# Patient Record
Sex: FEMALE | Race: BLACK OR AFRICAN AMERICAN | NOT HISPANIC OR LATINO | Employment: FULL TIME | URBAN - METROPOLITAN AREA
[De-identification: names, ages, dates, MRNs, and addresses within clinical notes are randomized per-mention and may not be internally consistent; named-entity substitution may affect disease eponyms.]

---

## 2017-06-01 ENCOUNTER — HOSPITAL ENCOUNTER (EMERGENCY)
Facility: HOSPITAL | Age: 22
Discharge: HOME | End: 2017-06-01
Admitting: EMERGENCY MEDICINE
Payer: COMMERCIAL

## 2017-06-01 ENCOUNTER — APPOINTMENT (EMERGENCY)
Dept: RADIOLOGY | Facility: HOSPITAL | Age: 22
End: 2017-06-01
Payer: COMMERCIAL

## 2017-06-01 ENCOUNTER — APPOINTMENT (EMERGENCY)
Dept: CT IMAGING | Facility: HOSPITAL | Age: 22
End: 2017-06-01
Payer: COMMERCIAL

## 2017-06-01 VITALS
HEIGHT: 65 IN | BODY MASS INDEX: 29.99 KG/M2 | DIASTOLIC BLOOD PRESSURE: 80 MMHG | SYSTOLIC BLOOD PRESSURE: 145 MMHG | HEART RATE: 83 BPM | OXYGEN SATURATION: 100 % | TEMPERATURE: 97.6 F | RESPIRATION RATE: 18 BRPM | WEIGHT: 180 LBS

## 2017-06-01 DIAGNOSIS — S16.1XXA STRAIN OF NECK MUSCLE, INITIAL ENCOUNTER: Primary | ICD-10-CM
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
## 2017-06-01 PROCEDURE — 70450 CT HEAD/BRAIN W/O DYE: CPT

## 2017-06-01 PROCEDURE — 99284 EMERGENCY DEPT VISIT MOD MDM: CPT

## 2017-06-01 PROCEDURE — 73130 X-RAY EXAM OF HAND: CPT

## 2017-06-01 RX ORDER — IBUPROFEN 600 MG/1
600 TABLET ORAL ONCE
Status: COMPLETED | OUTPATIENT
Start: 2017-06-01 | End: 2017-06-01

## 2017-06-01 RX ADMIN — IBUPROFEN 600 MG: 600 TABLET ORAL at 20:35

## 2017-09-09 ENCOUNTER — HOSPITAL ENCOUNTER (EMERGENCY)
Facility: HOSPITAL | Age: 22
Discharge: HOME | End: 2017-09-09
Attending: EMERGENCY MEDICINE | Admitting: EMERGENCY MEDICINE
Payer: COMMERCIAL

## 2017-09-09 VITALS
TEMPERATURE: 98.1 F | HEART RATE: 74 BPM | SYSTOLIC BLOOD PRESSURE: 127 MMHG | WEIGHT: 190.7 LBS | OXYGEN SATURATION: 100 % | RESPIRATION RATE: 16 BRPM | DIASTOLIC BLOOD PRESSURE: 71 MMHG | BODY MASS INDEX: 31.73 KG/M2

## 2017-09-09 DIAGNOSIS — B35.4 TINEA CORPORIS: Primary | ICD-10-CM

## 2017-09-09 PROCEDURE — 99282 EMERGENCY DEPT VISIT SF MDM: CPT

## 2017-09-09 RX ORDER — CLOTRIMAZOLE 1 %
CREAM (GRAM) TOPICAL
Qty: 28 G | Refills: 0 | Status: SHIPPED | OUTPATIENT
Start: 2017-09-09 | End: 2022-02-09

## 2017-09-09 NOTE — ED PROVIDER NOTES
History  Chief Complaint   Patient presents with    Rash     pt started with a rash about two weeks ago on the left sholder and has since spead to her armpit and "vaginal area" is itchy     Patient is a pleasant 79-year-old female that reports to the emergency department with a pruritic rash over the left armpit  This developed 2 days ago  She has a history of hidradenitis, however this does not appear to be infected nor did she have any areas of abscesses that required drainage  The rash has satellite lesions consistent with tinea corporis, recommended treatment with either over-the-counter terbinafine spray or the prescription clotrimazole that I wrote for her  Patient given follow-up instructions and return precautions  The patient (and any family present) verbalized understanding of the discharge instructions and warnings that would necessitate return to the Emergency Department  All questions were answered prior to discharge  Physical exam benign other than some areas of healing hidradenitis in the groin and bilateral armpits and the rash in the left armpit  No fevers, chills, sweats, nausea, vomiting, diarrhea, headedness, dizziness  None       History reviewed  No pertinent past medical history  History reviewed  No pertinent surgical history  History reviewed  No pertinent family history  I have reviewed and agree with the history as documented      Social History   Substance Use Topics    Smoking status: Never Smoker    Smokeless tobacco: Never Used    Alcohol use No        Review of Systems    Physical Exam  ED Triage Vitals [09/09/17 0135]   Temperature Pulse Respirations Blood Pressure SpO2   98 1 °F (36 7 °C) 74 16 127/71 100 %      Temp Source Heart Rate Source Patient Position BP Location FiO2 (%)   Oral Monitor Lying Right arm --      Pain Score       --           Physical Exam    ED Medications  Medications - No data to display    Diagnostic Studies  Labs Reviewed - No data to display    No orders to display       Procedures  Procedures      Phone Contacts  ED Phone Contact    ED Course  ED Course                                MDM  CritCare Time    Disposition  Final diagnoses:   Tinea corporis     ED Disposition     ED Disposition Condition Comment    Discharge  [address] discharge to home/self care  Condition at discharge: Good        Follow-up Information     Follow up With Specialties Details Why Contact Info    Primary Care Doctor            Patient's Medications   Discharge Prescriptions    CLOTRIMAZOLE (LOTRIMIN) 1 % CREAM    Apply to affected area 2 times daily for 4 weeks       Start Date: 9/9/2017  End Date: --       Order Dose: --       Quantity: 28 g    Refills: 0     No discharge procedures on file      ED Provider  Electronically Signed by       Suraj Herrera MD  09/09/17 0657

## 2017-09-09 NOTE — DISCHARGE INSTRUCTIONS
Tinea Corporis   WHAT YOU NEED TO KNOW:   Tinea corporis, or ringworm, is a skin infection caused by a fungus  Tinea corporis is most common in school children and athletes  DISCHARGE INSTRUCTIONS:   Medicines:   · Antifungal medicine: This may be given as a cream or pill  Take the medicine until it is gone, even if it looks like your infection is gone sooner  · Take your medicine as directed  Call your healthcare provider if you think your medicine is not helping or if you have side effects  Tell him if you are allergic to any medicine  Keep a list of the medicines, vitamins, and herbs you take  Include the amounts, and when and why you take them  Bring the list or the pill bottles to follow-up visits  Carry your medicine list with you in case of an emergency  Follow up with your healthcare provider as directed:  Write down your questions so you remember to ask them during your visits  Self-care:   · Wash all items that come into contact with infected skin:  Wash all towels, clothes, and bedding in hot water  Use laundry soap  Clean shower stalls, mats, and floors with a germ-killing or fungus-killing   · Do not share personal items:  Do not share towels, brushes, fermin, or hair accessories  · Keep your skin, hair, and nails clean and dry:  Bathe every day, and dry your skin before you put medicine on the infected area  Wash your hands often  Do not scratch your sores  This may cause the infection to spread  · Avoid infected pets:  A patch of missing fur is a sign of infection in a pet  Take your infected pet to a  for treatment  Contact your healthcare provider if:   · You have a fever  · Your infection continues to spread after 7 days of treatment  · Your rash is not gone in 2 weeks  · The area around your rash becomes red, warm, tender, swollen, or smells bad  · You have questions or concerns about your condition or care    © 2016 [employer]  Information is for End User's use only and may not be sold, redistributed or otherwise used for commercial purposes  All illustrations and images included in CareNotes® are the copyrighted property of [employer]  or Osmar [employer]  The above information is an  only  It is not intended as medical advice for individual conditions or treatments  Talk to your doctor, nurse or pharmacist before following any medical regimen to see if it is safe and effective for you

## 2017-10-27 ENCOUNTER — HOSPITAL ENCOUNTER (EMERGENCY)
Facility: HOSPITAL | Age: 22
Discharge: HOME | End: 2017-10-27
Attending: EMERGENCY MEDICINE | Admitting: EMERGENCY MEDICINE
Payer: COMMERCIAL

## 2017-10-27 VITALS
OXYGEN SATURATION: 100 % | WEIGHT: 185 LBS | DIASTOLIC BLOOD PRESSURE: 61 MMHG | HEART RATE: 82 BPM | TEMPERATURE: 97.9 F | HEIGHT: 65 IN | SYSTOLIC BLOOD PRESSURE: 119 MMHG | RESPIRATION RATE: 18 BRPM | BODY MASS INDEX: 30.82 KG/M2

## 2017-10-27 DIAGNOSIS — L02.31 LEFT BUTTOCK ABSCESS: Primary | ICD-10-CM

## 2017-10-27 LAB
ANION GAP SERPL CALCULATED.3IONS-SCNC: 11 MMOL/L (ref 4–13)
BACTERIA UR QL AUTO: ABNORMAL /HPF
BASOPHILS # BLD AUTO: 0.02 THOUSANDS/ΜL (ref 0–0.1)
BASOPHILS NFR BLD AUTO: 0 % (ref 0–1)
BILIRUB UR QL STRIP: NEGATIVE
BUN SERPL-MCNC: 8 MG/DL (ref 5–25)
CALCIUM SERPL-MCNC: 9 MG/DL (ref 8.3–10.1)
CHLORIDE SERPL-SCNC: 105 MMOL/L (ref 100–108)
CLARITY UR: CLEAR
CO2 SERPL-SCNC: 23 MMOL/L (ref 21–32)
COLOR UR: YELLOW
CREAT SERPL-MCNC: 0.88 MG/DL (ref 0.6–1.3)
EOSINOPHIL # BLD AUTO: 0.17 THOUSAND/ΜL (ref 0–0.61)
EOSINOPHIL NFR BLD AUTO: 2 % (ref 0–6)
ERYTHROCYTE [DISTWIDTH] IN BLOOD BY AUTOMATED COUNT: 13.2 % (ref 11.6–15.1)
EXT PREG TEST URINE: NEGATIVE
GFR SERPL CREATININE-BSD FRML MDRD: 109 ML/MIN/1.73SQ M
GLUCOSE SERPL-MCNC: 88 MG/DL (ref 65–140)
GLUCOSE UR STRIP-MCNC: NEGATIVE MG/DL
HCT VFR BLD AUTO: 42 % (ref 34.8–46.1)
HGB BLD-MCNC: 13.3 G/DL (ref 11.5–15.4)
HGB UR QL STRIP.AUTO: ABNORMAL
KETONES UR STRIP-MCNC: NEGATIVE MG/DL
LEUKOCYTE ESTERASE UR QL STRIP: NEGATIVE
LYMPHOCYTES # BLD AUTO: 1.18 THOUSANDS/ΜL (ref 0.6–4.47)
LYMPHOCYTES NFR BLD AUTO: 11 % (ref 14–44)
MCH RBC QN AUTO: 26.9 PG (ref 26.8–34.3)
MCHC RBC AUTO-ENTMCNC: 31.7 G/DL (ref 31.4–37.4)
MCV RBC AUTO: 85 FL (ref 82–98)
MONOCYTES # BLD AUTO: 0.71 THOUSAND/ΜL (ref 0.17–1.22)
MONOCYTES NFR BLD AUTO: 7 % (ref 4–12)
NEUTROPHILS # BLD AUTO: 8.65 THOUSANDS/ΜL (ref 1.85–7.62)
NEUTS SEG NFR BLD AUTO: 80 % (ref 43–75)
NITRITE UR QL STRIP: NEGATIVE
NON-SQ EPI CELLS URNS QL MICRO: ABNORMAL /HPF
NRBC BLD AUTO-RTO: 0 /100 WBCS
PH UR STRIP.AUTO: 6.5 [PH] (ref 4.5–8)
PLATELET # BLD AUTO: 273 THOUSANDS/UL (ref 149–390)
PMV BLD AUTO: 10.7 FL (ref 8.9–12.7)
POTASSIUM SERPL-SCNC: 3.7 MMOL/L (ref 3.5–5.3)
PROT UR STRIP-MCNC: NEGATIVE MG/DL
RBC # BLD AUTO: 4.95 MILLION/UL (ref 3.81–5.12)
RBC #/AREA URNS AUTO: ABNORMAL /HPF
SODIUM SERPL-SCNC: 139 MMOL/L (ref 136–145)
SP GR UR STRIP.AUTO: 1.02 (ref 1–1.03)
UROBILINOGEN UR QL STRIP.AUTO: 1 E.U./DL
WBC # BLD AUTO: 10.77 THOUSAND/UL (ref 4.31–10.16)
WBC #/AREA URNS AUTO: ABNORMAL /HPF

## 2017-10-27 PROCEDURE — 99285 EMERGENCY DEPT VISIT HI MDM: CPT

## 2017-10-27 PROCEDURE — 80048 BASIC METABOLIC PNL TOTAL CA: CPT | Performed by: EMERGENCY MEDICINE

## 2017-10-27 PROCEDURE — 81001 URINALYSIS AUTO W/SCOPE: CPT | Performed by: EMERGENCY MEDICINE

## 2017-10-27 PROCEDURE — 36415 COLL VENOUS BLD VENIPUNCTURE: CPT | Performed by: EMERGENCY MEDICINE

## 2017-10-27 PROCEDURE — 81025 URINE PREGNANCY TEST: CPT | Performed by: EMERGENCY MEDICINE

## 2017-10-27 PROCEDURE — 93005 ELECTROCARDIOGRAM TRACING: CPT | Performed by: EMERGENCY MEDICINE

## 2017-10-27 PROCEDURE — 85025 COMPLETE CBC W/AUTO DIFF WBC: CPT | Performed by: EMERGENCY MEDICINE

## 2017-10-27 RX ORDER — NAPROXEN 500 MG/1
500 TABLET ORAL 2 TIMES DAILY WITH MEALS
Qty: 20 TABLET | Refills: 0 | Status: SHIPPED | OUTPATIENT
Start: 2017-10-27 | End: 2017-11-06

## 2017-10-27 RX ORDER — HYDROCODONE BITARTRATE AND ACETAMINOPHEN 5; 325 MG/1; MG/1
1 TABLET ORAL EVERY 6 HOURS PRN
Qty: 10 TABLET | Refills: 0 | Status: SHIPPED | OUTPATIENT
Start: 2017-10-27 | End: 2017-10-30

## 2017-10-27 RX ORDER — HYDROCODONE BITARTRATE AND ACETAMINOPHEN 5; 325 MG/1; MG/1
1 TABLET ORAL ONCE
Status: COMPLETED | OUTPATIENT
Start: 2017-10-27 | End: 2017-10-27

## 2017-10-27 RX ORDER — LIDOCAINE HYDROCHLORIDE AND EPINEPHRINE 10; 10 MG/ML; UG/ML
20 INJECTION, SOLUTION INFILTRATION; PERINEURAL ONCE
Status: COMPLETED | OUTPATIENT
Start: 2017-10-27 | End: 2017-10-27

## 2017-10-27 RX ADMIN — LIDOCAINE HYDROCHLORIDE,EPINEPHRINE BITARTRATE 20 ML: 10; .01 INJECTION, SOLUTION INFILTRATION; PERINEURAL at 19:56

## 2017-10-27 RX ADMIN — HYDROCODONE BITARTRATE AND ACETAMINOPHEN 1 TABLET: 5; 325 TABLET ORAL at 20:23

## 2017-10-27 NOTE — ED PROVIDER NOTES
History  Chief Complaint   Patient presents with    Rectal Bleeding     pt has a boil on her buttocks that burst and is bleeding and also believes she is bleeding from the rectum     51-year-old female denies past medical history presenting with chief complaint of boil on her left buttock as well as rectal bleeding  Patient states that she gets recurrent abscesses in her groin and buttock and armpits, she was evaluated at [facility] earlier this morning, she has been using warm compresses and was prescribed antibiotics (bactrim) this morning which she began taking  She with was in her usual health without fevers or other complaints she went to sit down on the toilet prior to arrival to have a bowel movement and she had a moderate amount of painless rectal bleeding in the toilet without intermixed stool or clots  She came the emergency department for evaluation she notes some pain in the area of her buttock where her boil, she noted that she felt mildly dizzy otherwise that has resolved, but otherwise denies subsequent bleeding denies history of similar denies history of poor wound healing easy bleeding or bruising, diabetes fevers chills headache chest pain cough shortness of breath nausea vomiting anorexia abdominal pain flank or back pain urinary symptoms vaginal bleeding or discharge leg swelling calf pain or tenderness  Complete review systems otherwise negative            Prior to Admission Medications   Prescriptions Last Dose Informant Patient Reported? Taking? clotrimazole (LOTRIMIN) 1 % cream   No No   Sig: Apply to affected area 2 times daily for 4 weeks      Facility-Administered Medications: None       History reviewed  No pertinent past medical history  History reviewed  No pertinent surgical history  History reviewed  No pertinent family history  I have reviewed and agree with the history as documented      Social History   Substance Use Topics    Smoking status: Never Smoker    Smokeless tobacco: Never Used    Alcohol use No        Review of Systems   Constitutional: Negative for chills and fever  HENT: Negative for rhinorrhea and sore throat  Eyes: Negative for photophobia and pain  Respiratory: Negative for cough and shortness of breath  Cardiovascular: Negative for chest pain and palpitations  Gastrointestinal: Negative for abdominal pain, diarrhea, nausea and vomiting  Buttock pain and rectal bleeding   Endocrine: Negative for polydipsia and polyphagia  Genitourinary: Negative for dysuria, frequency, urgency, vaginal bleeding and vaginal discharge  Musculoskeletal: Negative for arthralgias, back pain and myalgias  Skin: Positive for wound  Negative for color change and rash  Allergic/Immunologic: Negative for immunocompromised state  Neurological: Positive for dizziness  Negative for weakness, light-headedness and headaches  Hematological: Negative for adenopathy  Does not bruise/bleed easily  Psychiatric/Behavioral: Negative for agitation and behavioral problems  All other systems reviewed and are negative  Physical Exam  ED Triage Vitals [10/27/17 1743]   Temperature Pulse Respirations Blood Pressure SpO2   97 9 °F (36 6 °C) 82 18 119/61 100 %      Temp src Heart Rate Source Patient Position - Orthostatic VS BP Location FiO2 (%)   -- -- -- -- --      Pain Score       No Pain           Orthostatic Vital Signs  Vitals:    10/27/17 1743   BP: 119/61   Pulse: 82       Physical Exam   Constitutional: She is oriented to person, place, and time  She appears well-developed and well-nourished  No distress  Well-appearing on exam conversational no acute distress   HENT:   Head: Normocephalic and atraumatic  Eyes: EOM are normal  Pupils are equal, round, and reactive to light  Neck: Normal range of motion  Neck supple  No tracheal deviation present  Cardiovascular: Normal rate, regular rhythm and normal heart sounds    Exam reveals no gallop and no friction rub  No murmur heard  Pulmonary/Chest: Effort normal and breath sounds normal  She has no wheezes  She has no rales  Abdominal: Soft  Bowel sounds are normal  She exhibits no distension  There is no tenderness  There is no rebound and no guarding  Genitourinary:   Genitourinary Comments: Richard Tomlin see the patient's nurse was present for both the exam in the incision and drainage, on evaluation the patient does not appear to have rectal bleeding she has a 3 cm x 2 cm left-sided buttock abscess that does not track down into her perianal or rectal area, this has spontaneous seropurulent drainage the entire abscess can be isolated and lifted off of the the buttock, there was no pain with rectal exam there was no palpable mass, there is no pain or fluctuance there is no apparent fistula on exam this appears to be an uncomplicated left buttock abscess with spontaneous drainage   Musculoskeletal: Normal range of motion  She exhibits no edema or tenderness  Neurological: She is alert and oriented to person, place, and time  No cranial nerve deficit  She exhibits normal muscle tone  Coordination normal    Skin: Skin is warm and dry  No rash noted  Psychiatric: She has a normal mood and affect  Her behavior is normal    Nursing note and vitals reviewed        ED Medications  Medications   lidocaine-epinephrine (XYLOCAINE/EPINEPHRINE) 1 %-1:100,000 injection 20 mL (20 mL Infiltration Given 10/27/17 1956)   HYDROcodone-acetaminophen (NORCO) 5-325 mg per tablet 1 tablet (1 tablet Oral Given 10/27/17 2023)       Diagnostic Studies  Results Reviewed     Procedure Component Value Units Date/Time    Urine Microscopic [[id number]]  (Abnormal) Collected:  10/27/17 1855    Lab Status:  Final result Specimen:  Urine from Urine, Clean Catch Updated:  10/27/17 1914     RBC, UA 1-2 (A) /hpf      WBC, UA None Seen /hpf      Epithelial Cells Occasional /hpf      Bacteria, UA Occasional /hpf     UA w Reflex to Microscopic w Reflex to Culture [[id number]]  (Abnormal) Collected:  10/27/17 1855    Lab Status:  Final result Specimen:  Urine from Urine, Clean Catch Updated:  10/27/17 1906     Color, UA Yellow     Clarity, UA Clear     Specific [place], UA 1 020     pH, UA 6 5     Leukocytes, UA Negative     Nitrite, UA Negative     Protein, UA Negative mg/dl      Glucose, UA Negative mg/dl      Ketones, UA Negative mg/dl      Urobilinogen, UA 1 0 E U /dl      Bilirubin, UA Negative     Blood, UA Trace-Intact (A)    POCT pregnancy, urine [[id number]]  (Normal) Resulted:  10/27/17 1858    Lab Status:  Final result Updated:  10/27/17 1858     EXT PREG TEST UR (Ref: Negative) Negative    Basic metabolic panel [[id number]] Collected:  10/27/17 1829    Lab Status:  Final result Specimen:  Blood from Arm, Right Updated:  10/27/17 1855     Sodium 139 mmol/L      Potassium 3 7 mmol/L      Chloride 105 mmol/L      CO2 23 mmol/L      Anion Gap 11 mmol/L      BUN 8 mg/dL      Creatinine 0 88 mg/dL      Glucose 88 mg/dL      Calcium 9 0 mg/dL      eGFR 109 ml/min/1 73sq m     Narrative:         National Kidney Disease Education Program recommendations are as follows:  GFR calculation is accurate only with a steady state creatinine  Chronic Kidney disease less than 60 ml/min/1 73 sq  meters  Kidney failure less than 15 ml/min/1 73 sq  meters      CBC and differential [[id number]]  (Abnormal) Collected:  10/27/17 1829    Lab Status:  Final result Specimen:  Blood from Arm, Right Updated:  10/27/17 1842     WBC 10 77 (H) Thousand/uL      RBC 4 95 Million/uL      Hemoglobin 13 3 g/dL      Hematocrit 42 0 %      MCV 85 fL      MCH 26 9 pg      MCHC 31 7 g/dL      RDW 13 2 %      MPV 10 7 fL      Platelets 369 Thousands/uL      nRBC 0 /100 WBCs      Neutrophils Relative 80 (H) %      Lymphocytes Relative 11 (L) %      Monocytes Relative 7 %      Eosinophils Relative 2 %      Basophils Relative 0 %      Neutrophils Absolute 8 65 (H) Thousands/µL      Lymphocytes Absolute 1 18 Thousands/µL      Monocytes Absolute 0 71 Thousand/µL      Eosinophils Absolute 0 17 Thousand/µL      Basophils Absolute 0 02 Thousands/µL                  No orders to display              Procedures  ECG 12 Lead Documentation  Date/Time: 10/27/2017 6:53 PM  Performed by: Jose Antonio Akhtar by: David Mathur     Indications / Diagnosis:  Dizzy  Patient location:  ED  Previous ECG:     Previous ECG:  Unavailable  Interpretation:     Interpretation: normal    Rate:     ECG rate:  74    ECG rate assessment: normal    Rhythm:     Rhythm: sinus rhythm    Ectopy:     Ectopy: none    QRS:     QRS axis:  Normal  Conduction:     Conduction: normal    ST segments:     ST segments:  Normal  T waves:     T waves: normal    Incision/Drainage  Date/Time: 10/27/2017 8:00 PM  Performed by: Jose Antonio Akhtar by: David Mathur     Patient location:  ED  Consent:     Consent obtained:  Verbal    Consent given by:  Patient    Risks discussed:  Bleeding, damage to other organs, infection, incomplete drainage and pain    Alternatives discussed:  No treatment, delayed treatment, alternative treatment, observation and referral  Universal protocol:     Procedure explained and questions answered to patient or proxy's satisfaction: yes      Relevant documents present and verified: yes      Patient identity confirmed:  Verbally with patient and arm band  Location:     Type:  Abscess    Size:  3    Location:  Other (comment)  Pre-procedure details:     Skin preparation:  Betadine  Anesthesia (see MAR for exact dosages):      Anesthesia method:  Local infiltration    Local anesthetic:  Lidocaine 1% WITH epi  Procedure details:     Complexity:  Simple    Needle aspiration: no      Incision types:  Single straight    Scalpel blade:  11    Approach:  Open    Incision depth:  Skin    Wound management:  Probed and deloculated, irrigated with saline and extensive cleaning    Drainage:  Bloody and purulent Drainage amount: Moderate    Wound treatment:  Packing placed    Packing materials:  1/2 in gauze  Post-procedure details:     Patient tolerance of procedure:   Tolerated well, no immediate complications  Comments:      Mirela Campbell was present for exam and incision and drainage, after discussing risks benefits alternatives patient does haveSpontaneous drainage from pustule, although offered I and D, after discussing again risks benefits alternatives, the patient was laid prone, the entire area was visualized cleaned with Betadine anesthetized with 1% lidocaine with epinephrine he single small cyst straight incision was placed over the area of maximal fluctuance, the entire wound was probed de loculated with sterile hemostat and appeared to be very superficial and did not track, moderate seropurulent discharge, packed with 1/2 inch plain packing her patient given extensive discharge return instructions currently on Bactrim, she states this is recurrentDiscussed possibility of possible fistula with her current left buttock abscess adjacent to julissa anal area, will follow up, close return instructions patient agreeable plan           Phone Contacts  ED Phone Contact    ED Course  ED Course as of Oct 28 1246   Fri Oct 27, 2017   1852 Hemoglobin: 13 3   1958 Patient has abscess with renate pus and purulence on her left buttock, her heme is negative there is no perianal abscess on exam this is not consistent with a perirectal abscess is on her left buttock approximately 2 cm by 3 cm the entire abscess can be isolated, there is no palpable mass on rectal exam no apparent fistula however patient states he has had this recurrently in the same spot discussed clinical concern for possible fistula although on exam this is most consistent with uncomplicated left buttock abscess this was cleaned after discussing risks benefits and alternatives to incision and drainage a 1 cm incision was made in the wound cavity was completely drained de loculated and probed as noted in the note patient was given extensive discharge return instructions, she understands she must follow up with GI for possible colonoscopy/Colorectal surgery for further evaluation of her current left but abscess which isd adjacent julissa anal area patient understands agrees to plan, patient's female nurse present and assisted for entire exam                                MDM  Number of Diagnoses or Management Options  Left buttock abscess:   Diagnosis management comments: 82-year-old female with recurrent abscesses, including on her left buttock, seen earlier today at another facility given Bactrim for left buttock abscess now with reported rectal bleeding however on exam this is more consistent purulent discharge from the boil on her left buttock, no pain for fluctuance on rectal exam, her stool was heme negative, brown this abscess is not appear to be perianal or perirectal however it is lateral to her anus, some spontaneous drainage although offered I and D, will check basic labs EKG urine pregnancy with dizziness and reported bleeding, discussed the possibility of possible fistula she states, this is a recurring problem, will in incise and drain continue Bactrim very close return instructions patient understands this gets worse he is not improving she may require CT scan of the pelvis and surgical drainage otherwise she will follow up with surgery and GI for further evaluation, patient agreeable plan    CritCare Time    Disposition  Final diagnoses:   Left buttock abscess     Time reflects when diagnosis was documented in both MDM as applicable and the Disposition within this note     Time User Action Codes Description Comment    10/27/2017  8:07 PM Ute Leone Add [L02 31] Left buttock abscess       ED Disposition     ED Disposition Condition Comment    Discharge  [address] discharge to home/self care      Condition at discharge: Good        Follow-up Information Follow up With Specialties Details Why [address] [facility]  In 1 week  [address]    Delroy Alonso MD General Surgery In 1 week  [address]  [address]  [address] [address]  [phone]          Discharge Medication List as of 10/27/2017  8:09 PM      START taking these medications    Details   HYDROcodone-acetaminophen (NORCO) 5-325 mg per tablet Take 1 tablet by mouth every 6 (six) hours as needed for pain for up to 3 days Max Daily Amount: 4 tablets, Starting Fri 10/27/2017, Until Mon 10/30/2017, Print      naproxen (NAPROSYN) 500 mg tablet Take 1 tablet by mouth 2 (two) times a day with meals for 10 days, Starting Fri 10/27/2017, Until Mon 11/6/2017, Print         CONTINUE these medications which have NOT CHANGED    Details   clotrimazole (LOTRIMIN) 1 % cream Apply to affected area 2 times daily for 4 weeks, Print           No discharge procedures on file      ED Provider  Electronically Signed by           Nehal Todd DO  10/28/17 1240

## 2017-10-28 NOTE — DISCHARGE INSTRUCTIONS
As discussed please return at any time if you do have worsening or develops other concerning symptoms as instructed including fevers pain with defecation, worsening symptoms or other concerning symptoms  Otherwise your to follow up either with a surgeon or with the GI doctor for further evaluation of this recurrent left buttock abscess/perianal abscess concerning for possible fistula  Please continue taking the Bactrim continue local wound care as instructed    Abscess   WHAT YOU NEED TO KNOW:   A warm compress may help your abscess drain  Your healthcare provider may make a cut in the abscess so it can drain  You may need surgery to remove an abscess that is on your hands or buttocks  DISCHARGE INSTRUCTIONS:   Return to the emergency department if:   · The area around your abscess becomes very painful, warm, or has red streaks  · You have a fever and chills  · Your heart is beating faster than usual      · You feel faint or confused  Contact your healthcare provider if:   · Your abscess gets bigger or does not get better  · Your abscess returns  · You have questions or concerns about your condition or care  Medicines: You may  need any of the following:  · Antibiotics  help treat a bacterial infection  · Acetaminophen  decreases pain and fever  It is available without a doctor's order  Ask how much to take and how often to take it  Follow directions  Acetaminophen can cause liver damage if not taken correctly  · NSAIDs , such as ibuprofen, help decrease swelling, pain, and fever  This medicine is available with or without a doctor's order  NSAIDs can cause stomach bleeding or kidney problems in certain people  If you take blood thinner medicine, always ask your healthcare provider if NSAIDs are safe for you  Always read the medicine label and follow directions  · Take your medicine as directed    Contact your healthcare provider if you think your medicine is not helping or if you have side effects  Tell him or her if you are allergic to any medicine  Keep a list of the medicines, vitamins, and herbs you take  Include the amounts, and when and why you take them  Bring the list or the pill bottles to follow-up visits  Carry your medicine list with you in case of an emergency  Self-care:   · Apply a warm compress to your abscess  This will help it open and drain  Wet a washcloth in warm, but not hot, water  Apply the compress for 10 minutes  Repeat this 4 times each day  Do not  press on an abscess or try to open it with a needle  You may push the bacteria deeper or into your blood  · Do not share your clothes, towels, or sheets with anyone  This can spread the infection to others  · Wash your hands often  This can help prevent the spread of germs  Use soap and water or an alcohol-based hand rub  Care for your wound after it is drained:   · Care for your wound as directed  If your healthcare provider says it is okay, carefully remove the bandage and gauze packing  You may need to soak the gauze to get it out of your wound  Clean your wound and the area around it as directed  Dry the area and put on new, clean bandages  Change your bandages when they get wet or dirty  · Ask your healthcare provider how to change the gauze in your wound  Keep track of how many pieces of gauze are placed inside the wound  Do not put too much packing in the wound  Do not pack the gauze too tightly in your wound  Follow up with your healthcare provider in 1 to 3 days: You may need to have your packing removed or your bandage changed  Write down your questions so you remember to ask them during your visits  © 2017 [address]  Information is for End User's use only and may not be sold, redistributed or otherwise used for commercial purposes  All illustrations and images included in CareNotes® are the copyrighted property of [employer]  or [address]    The above information is an  only  It is not intended as medical advice for individual conditions or treatments  Talk to your doctor, nurse or pharmacist before following any medical regimen to see if it is safe and effective for you

## 2017-10-29 LAB
ATRIAL RATE: 74 BPM
P AXIS: 76 DEGREES
PR INTERVAL: 138 MS
QRS AXIS: 80 DEGREES
QRSD INTERVAL: 70 MS
QT INTERVAL: 384 MS
QTC INTERVAL: 426 MS
T WAVE AXIS: 30 DEGREES
VENTRICULAR RATE: 74 BPM

## 2018-11-13 ENCOUNTER — HOSPITAL ENCOUNTER (EMERGENCY)
Facility: HOSPITAL | Age: 23
Discharge: HOME | End: 2018-11-13
Attending: EMERGENCY MEDICINE
Payer: COMMERCIAL

## 2018-11-13 VITALS
HEART RATE: 74 BPM | OXYGEN SATURATION: 99 % | DIASTOLIC BLOOD PRESSURE: 64 MMHG | WEIGHT: 194.22 LBS | RESPIRATION RATE: 18 BRPM | SYSTOLIC BLOOD PRESSURE: 140 MMHG | TEMPERATURE: 98.7 F | BODY MASS INDEX: 32.32 KG/M2

## 2018-11-13 DIAGNOSIS — T14.8XXA ABRASION: Primary | ICD-10-CM

## 2018-11-13 PROCEDURE — 99284 EMERGENCY DEPT VISIT MOD MDM: CPT

## 2018-11-13 NOTE — DISCHARGE INSTRUCTIONS
Abrasion   WHAT YOU NEED TO KNOW:   An abrasion is a scrape on your skin  It happens when your skin rubs against a rough surface  Some examples of an abrasion include rug burn, a skinned elbow, or road rash  Abrasions can be many shapes and sizes  The wound may hurt, bleed, bruise, or swell  DISCHARGE INSTRUCTIONS:   Return to the emergency department if:   · The bleeding does not stop after 10 minutes of firm pressure  · You cannot rinse one or more foreign objects out of your wound  · You have red streaks on your skin coming from your wound  Contact your healthcare provider if:   · You have a fever or chills  · Your abrasion is red, warm, swollen, or draining pus  · You have questions or concerns about your condition or care  Care for your abrasion:   · Wash your hands and dry them with a clean towel  · Press a clean cloth against your wound to stop any bleeding  · Rinse your wound with a lot of clean water  Do not use harsh soap, alcohol, or iodine solutions  · Use a clean, wet cloth to remove any objects, such as small pieces of rocks or dirt  · Rub antibiotic ointment on your wound  This may help prevent infection and help your wound heal     · Cover the wound with a non-stick bandage  Change the bandage daily, and if gets wet or dirty  Follow up with your healthcare provider as directed:  Write down your questions so you remember to ask them during your visits  © 2017 [employer] Information is for End User's use only and may not be sold, redistributed or otherwise used for commercial purposes  All illustrations and images included in CareNotes® are the copyrighted property of [employer] , [employer]  or Osmar Marinelli  The above information is an  only  It is not intended as medical advice for individual conditions or treatments   Talk to your doctor, nurse or pharmacist before following any medical regimen to see if it is safe and effective for you

## 2018-11-13 NOTE — ED PROVIDER NOTES
History  Chief Complaint   Patient presents with    Rectal Bleeding     Patient reports rectal bleeding for past 2 weeks  Patient reports blood when wiping  Patient denies trauma  Patient is a 80-year-old female presents to the emergency department with complaints of rectal bleeding that has been present on and off for last 2 weeks  Patient denies constipation  Patient denies pain when defecating  Patient states that she notices the blood only on the toilet paper when she wipes  She states that there is no blood in the toilet at all  Prior to Admission Medications   Prescriptions Last Dose Informant Patient Reported? Taking? clotrimazole (LOTRIMIN) 1 % cream   No No   Sig: Apply to affected area 2 times daily for 4 weeks   naproxen (NAPROSYN) 500 mg tablet   No No   Sig: Take 1 tablet by mouth 2 (two) times a day with meals for 10 days      Facility-Administered Medications: None       History reviewed  No pertinent past medical history  History reviewed  No pertinent surgical history  History reviewed  No pertinent family history  I have reviewed and agree with the history as documented  Social History   Substance Use Topics    Smoking status: Never Smoker    Smokeless tobacco: Never Used    Alcohol use Yes      Comment: socially        Review of Systems   Constitutional: Negative for fever  Respiratory: Negative for shortness of breath  Cardiovascular: Negative for chest pain  Skin: Positive for wound  All other systems reviewed and are negative  Physical Exam  Physical Exam   Constitutional: She appears well-developed and well-nourished  HENT:   Head: Normocephalic and atraumatic  Right Ear: External ear normal    Left Ear: External ear normal    Nose: Nose normal    Mouth/Throat: Oropharynx is clear and moist    Eyes: Pupils are equal, round, and reactive to light  Conjunctivae and EOM are normal    Neck: Normal range of motion     Cardiovascular: Normal rate, regular rhythm and normal heart sounds  Pulmonary/Chest: Effort normal and breath sounds normal    Abdominal: Soft  Bowel sounds are normal    Genitourinary:         Skin: Skin is warm  Psychiatric: She has a normal mood and affect  Her behavior is normal  Judgment and thought content normal    Vitals reviewed  Vital Signs  ED Triage Vitals [11/13/18 0143]   Temperature Pulse Respirations Blood Pressure SpO2   98 7 °F (37 1 °C) 74 18 140/64 99 %      Temp Source Heart Rate Source Patient Position - Orthostatic VS BP Location FiO2 (%)   Oral Monitor Sitting Right arm --      Pain Score       No Pain           Vitals:    11/13/18 0143   BP: 140/64   Pulse: 74   Patient Position - Orthostatic VS: Sitting       Visual Acuity      ED Medications  Medications - No data to display    Diagnostic Studies  Results Reviewed     None                 No orders to display              Procedures  Procedures       Phone Contacts  ED Phone Contact    ED Course                               MDM  Number of Diagnoses or Management Options  Diagnosis management comments: Patient is a 22-year-old female presents to the emergency department with complaints of rectal bleeding  Examination there is evidence of slight venous oozing from a scar of previous buttock abscess  There is no evidence of current infection  I recommended compression over the wound to stop the bleeding  I recommended avoiding wiping the area when defecating  Return parameters discussed at length  Patient stable for discharge  Risk of Complications, Morbidity, and/or Mortality  Presenting problems: low  Diagnostic procedures: low  Management options: low    Patient Progress  Patient progress: stable    CritCare Time    Disposition  Final diagnoses:   None     ED Disposition     None      Follow-up Information    None         Patient's Medications   Discharge Prescriptions    No medications on file     No discharge procedures on file      ED Provider  Electronically Signed by           Cori Saul PA-C  11/13/18 4504

## 2019-02-07 ENCOUNTER — OFFICE VISIT (OUTPATIENT)
Dept: DERMATOLOGY | Facility: CLINIC | Age: 24
End: 2019-02-07
Payer: COMMERCIAL

## 2019-02-07 DIAGNOSIS — L73.2 HIDRADENITIS: Primary | ICD-10-CM

## 2019-02-07 DIAGNOSIS — Z13.89 SCREENING FOR SKIN CONDITION: ICD-10-CM

## 2019-02-07 PROCEDURE — 87070 CULTURE OTHR SPECIMN AEROBIC: CPT | Performed by: DERMATOLOGY

## 2019-02-07 PROCEDURE — 87205 SMEAR GRAM STAIN: CPT | Performed by: DERMATOLOGY

## 2019-02-07 PROCEDURE — 99203 OFFICE O/P NEW LOW 30 MIN: CPT | Performed by: DERMATOLOGY

## 2019-02-07 RX ORDER — CLINDAMYCIN PHOSPHATE 10 UG/ML
LOTION TOPICAL DAILY
Qty: 60 ML | Refills: 3 | Status: SHIPPED | OUTPATIENT
Start: 2019-02-07 | End: 2019-02-26 | Stop reason: SDUPTHER

## 2019-02-07 NOTE — PROGRESS NOTES
[facility] DERMATOLOGY  [address] [place] [address]  [phone]     MRN: [id number] : 1995  Encounter: [id number]  Patient Information: Giana Kramer  Chief complaint:  Cyst in the groin and axilla    History of present illness:  51-year-old female presents secondary cyst in her groin and axilla which has been going on for about a year patient was seen by another dermatologist treated with antibiotics which he could not tolerate and continues to have problems with these areas and drainage the specifically right now in the area of the left axilla  No past medical history on file  No past surgical history on file  Social History   History   Alcohol Use    Yes     Comment: socially     History   Drug Use No     History   Smoking Status    Never Smoker   Smokeless Tobacco    Never Used     No family history on file  Meds/Allergies   Allergies   Allergen Reactions    Percolone [Oxycodone] GI Intolerance       Meds:  Prior to Admission medications    Medication Sig Start Date End Date Taking?  Authorizing Provider   clotrimazole (LOTRIMIN) 1 % cream Apply to affected area 2 times daily for 4 weeks 17  Yes Vic Coreas MD   naproxen (NAPROSYN) 500 mg tablet Take 1 tablet by mouth 2 (two) times a day with meals for 10 days 10/27/17 11/6/17  Yamil Moreland DO       Subjective:     Review of Systems:    General: negative for - chills, fatigue, fever,  weight gain or weight loss  Psychological: negative for - anxiety, behavioral disorder, concentration difficulties, decreased libido, depression, irritability, memory difficulties, mood swings, sleep disturbances or suicidal ideation  ENT: negative for - hearing difficulties , nasal congestion, nasal discharge, oral lesions, sinus pain, sneezing, sore throat  Allergy and Immunology: negative for - hives, insect bite sensitivity,  Hematological and Lymphatic: negative for - bleeding problems, blood clots,bruising, swollen lymph nodes  Endocrine: negative for - hair pattern changes, hot flashes, malaise/lethargy, mood swings, palpitations, polydipsia/polyuria, skin changes, temperature intolerance or unexpected weight change  Respiratory: negative for - cough, hemoptysis, orthopnea, shortness of breath, or wheezing  Cardiovascular: negative for - chest pain, dyspnea on exertion, edema,  Gastrointestinal: negative for - abdominal pain, nausea/vomiting  Genito-Urinary: negative for - dysuria, incontinence, irregular/heavy menses or urinary frequency/urgency  Musculoskeletal: negative for - gait disturbance, joint pain, joint stiffness, joint swelling, muscle pain, muscular weakness  Dermatological:  As in HPI  Neurological: negative for confusion, dizziness, headaches, impaired coordination/balance, memory loss, numbness/tingling, seizures, speech problems, tremors or weakness       Objective: There were no vitals taken for this visit  Physical Exam:    General Appearance:    Alert, cooperative, no distress   Head:    Normocephalic, without obvious abnormality, atraumatic   Lymphatics:    No lymphadenopathy noted      Abdomen:   No hepatosplenomegaly   Skin:   A full skin exam was performed including scalp, head scalp, eyes, ears, nose, lips, neck, chest, axilla, abdomen, back, buttocks, bilateral upper extremities, bilateral lower extremities, hands, feet, fingers, toes, fingernails, and toenails cystic nodules noted on both axilla also on the suprapubic area and postinflammatory lesions noted on the buttocks nothing else remarkable on exam     Assessment:     1  Hidradenitis     2  Screening for skin condition           Plan:   Hidradenitis discussed the concept of this process will go ahead and treat her with clindamycin await results of the culture to see if any other specific antibiotics would be helpful    Patient previously may have been on doxycycline which cause headache and she stopped taking it  We also discussed potential 1st localized surgery also potential for use of Humira  Screening for Dermatologic Disorders: Nothing else of concern noted on complete exam follow up in 1 year       Darryl Levy MD  2/7/2019,2:19 PM    Portions of the record may have been created with voice recognition software   Occasional wrong word or "sound a like" substitutions may have occurred due to the inherent limitations of voice recognition software   Read the chart carefully and recognize, using context, where substitutions have occurred

## 2019-02-07 NOTE — PATIENT INSTRUCTIONS
Hidradenitis discussed the concept of this process will go ahead and treat her with clindamycin await results of the culture to see if any other specific antibiotics would be helpful  Patient previously may have been on doxycycline which cause headache and she stopped taking it    We also discussed potential 1st localized surgery also potential for use of Humira  Screening for Dermatologic Disorders: Nothing else of concern noted on complete exam follow up in 1 year

## 2019-02-10 LAB
BACTERIA WND AEROBE CULT: NORMAL
GRAM STN SPEC: NORMAL

## 2019-02-13 ENCOUNTER — TELEPHONE (OUTPATIENT)
Dept: DERMATOLOGY | Facility: CLINIC | Age: 24
End: 2019-02-13

## 2019-02-21 ENCOUNTER — TELEPHONE (OUTPATIENT)
Dept: DERMATOLOGY | Facility: CLINIC | Age: 24
End: 2019-02-21

## 2019-02-25 ENCOUNTER — TELEPHONE (OUTPATIENT)
Dept: DERMATOLOGY | Facility: CLINIC | Age: 24
End: 2019-02-25

## 2019-02-26 DIAGNOSIS — L73.2 HIDRADENITIS: ICD-10-CM

## 2019-02-26 RX ORDER — CLINDAMYCIN PHOSPHATE 10 UG/ML
LOTION TOPICAL DAILY
Qty: 60 ML | Refills: 3 | Status: SHIPPED | OUTPATIENT
Start: 2019-02-26 | End: 2022-02-09

## 2019-05-13 ENCOUNTER — APPOINTMENT (OUTPATIENT)
Dept: RADIOLOGY | Facility: CLINIC | Age: 24
End: 2019-05-13
Payer: OTHER MISCELLANEOUS

## 2019-05-13 ENCOUNTER — APPOINTMENT (OUTPATIENT)
Dept: OCCUPATIONAL MEDICINE | Facility: CLINIC | Age: 24
End: 2019-05-13
Payer: OTHER MISCELLANEOUS

## 2019-05-13 ENCOUNTER — TRANSCRIBE ORDERS (OUTPATIENT)
Dept: OCCUPATIONAL MEDICINE | Facility: CLINIC | Age: 24
End: 2019-05-13

## 2019-05-13 DIAGNOSIS — R52 PAIN: Primary | ICD-10-CM

## 2019-05-13 DIAGNOSIS — R52 PAIN: ICD-10-CM

## 2019-05-13 PROCEDURE — 99213 OFFICE O/P EST LOW 20 MIN: CPT

## 2019-05-13 PROCEDURE — 73110 X-RAY EXAM OF WRIST: CPT

## 2019-05-24 ENCOUNTER — APPOINTMENT (OUTPATIENT)
Dept: OCCUPATIONAL MEDICINE | Facility: CLINIC | Age: 24
End: 2019-05-24
Payer: OTHER MISCELLANEOUS

## 2019-05-24 PROCEDURE — 99213 OFFICE O/P EST LOW 20 MIN: CPT

## 2019-06-06 ENCOUNTER — APPOINTMENT (OUTPATIENT)
Dept: OCCUPATIONAL MEDICINE | Facility: CLINIC | Age: 24
End: 2019-06-06
Payer: OTHER MISCELLANEOUS

## 2019-06-06 PROCEDURE — 99213 OFFICE O/P EST LOW 20 MIN: CPT

## 2019-06-20 ENCOUNTER — APPOINTMENT (OUTPATIENT)
Dept: OCCUPATIONAL MEDICINE | Facility: CLINIC | Age: 24
End: 2019-06-20
Payer: OTHER MISCELLANEOUS

## 2019-06-20 PROCEDURE — 99213 OFFICE O/P EST LOW 20 MIN: CPT

## 2020-12-04 ENCOUNTER — HOSPITAL ENCOUNTER (EMERGENCY)
Facility: HOSPITAL | Age: 25
Discharge: HOME | End: 2020-12-04
Attending: EMERGENCY MEDICINE | Admitting: EMERGENCY MEDICINE

## 2020-12-04 VITALS
BODY MASS INDEX: 32.32 KG/M2 | RESPIRATION RATE: 18 BRPM | OXYGEN SATURATION: 99 % | HEART RATE: 86 BPM | SYSTOLIC BLOOD PRESSURE: 123 MMHG | TEMPERATURE: 97.9 F | WEIGHT: 194.22 LBS | DIASTOLIC BLOOD PRESSURE: 86 MMHG

## 2020-12-04 DIAGNOSIS — L73.2 HYDRADENITIS: Primary | ICD-10-CM

## 2020-12-04 PROCEDURE — 10061 I&D ABSCESS COMP/MULTIPLE: CPT | Performed by: PHYSICIAN ASSISTANT

## 2020-12-04 PROCEDURE — 99283 EMERGENCY DEPT VISIT LOW MDM: CPT

## 2020-12-04 PROCEDURE — 99282 EMERGENCY DEPT VISIT SF MDM: CPT | Performed by: PHYSICIAN ASSISTANT

## 2020-12-04 RX ORDER — LIDOCAINE HYDROCHLORIDE 10 MG/ML
5 INJECTION, SOLUTION EPIDURAL; INFILTRATION; INTRACAUDAL; PERINEURAL ONCE
Status: COMPLETED | OUTPATIENT
Start: 2020-12-04 | End: 2020-12-04

## 2020-12-04 RX ADMIN — LIDOCAINE HYDROCHLORIDE 5 ML: 10 INJECTION, SOLUTION EPIDURAL; INFILTRATION; INTRACAUDAL; PERINEURAL at 23:07

## 2021-11-02 ENCOUNTER — HOSPITAL ENCOUNTER (EMERGENCY)
Facility: HOSPITAL | Age: 26
Discharge: HOME | End: 2021-11-02
Attending: EMERGENCY MEDICINE | Admitting: EMERGENCY MEDICINE
Payer: COMMERCIAL

## 2021-11-02 ENCOUNTER — APPOINTMENT (EMERGENCY)
Dept: RADIOLOGY | Facility: HOSPITAL | Age: 26
End: 2021-11-02
Payer: COMMERCIAL

## 2021-11-02 VITALS
HEIGHT: 65 IN | TEMPERATURE: 97.6 F | SYSTOLIC BLOOD PRESSURE: 154 MMHG | BODY MASS INDEX: 31.65 KG/M2 | OXYGEN SATURATION: 98 % | DIASTOLIC BLOOD PRESSURE: 99 MMHG | WEIGHT: 190 LBS | RESPIRATION RATE: 18 BRPM | HEART RATE: 76 BPM

## 2021-11-02 DIAGNOSIS — M79.672 FOOT PAIN, LEFT: Primary | ICD-10-CM

## 2021-11-02 PROCEDURE — 99283 EMERGENCY DEPT VISIT LOW MDM: CPT

## 2021-11-02 PROCEDURE — 73630 X-RAY EXAM OF FOOT: CPT

## 2021-11-02 PROCEDURE — 99284 EMERGENCY DEPT VISIT MOD MDM: CPT | Performed by: EMERGENCY MEDICINE

## 2021-11-22 ENCOUNTER — OFFICE VISIT (OUTPATIENT)
Dept: URGENT CARE | Age: 26
End: 2021-11-22
Payer: COMMERCIAL

## 2021-11-22 VITALS
WEIGHT: 200 LBS | SYSTOLIC BLOOD PRESSURE: 126 MMHG | DIASTOLIC BLOOD PRESSURE: 95 MMHG | BODY MASS INDEX: 33.32 KG/M2 | HEIGHT: 65 IN | OXYGEN SATURATION: 98 %

## 2021-11-22 DIAGNOSIS — L73.2 HIDRADENITIS SUPPURATIVA OF ANUS: Primary | ICD-10-CM

## 2021-11-22 PROCEDURE — G0382 LEV 3 HOSP TYPE B ED VISIT: HCPCS | Performed by: STUDENT IN AN ORGANIZED HEALTH CARE EDUCATION/TRAINING PROGRAM

## 2021-11-22 PROCEDURE — 99283 EMERGENCY DEPT VISIT LOW MDM: CPT | Performed by: STUDENT IN AN ORGANIZED HEALTH CARE EDUCATION/TRAINING PROGRAM

## 2021-11-22 RX ORDER — CLINDAMYCIN HYDROCHLORIDE 300 MG/1
300 CAPSULE ORAL 2 TIMES DAILY
Qty: 14 CAPSULE | Refills: 0 | Status: SHIPPED | OUTPATIENT
Start: 2021-11-22 | End: 2021-11-29

## 2022-02-01 ENCOUNTER — HOSPITAL ENCOUNTER (EMERGENCY)
Facility: HOSPITAL | Age: 27
Discharge: HOME | End: 2022-02-01
Attending: EMERGENCY MEDICINE | Admitting: EMERGENCY MEDICINE
Payer: COMMERCIAL

## 2022-02-01 VITALS
HEART RATE: 65 BPM | SYSTOLIC BLOOD PRESSURE: 136 MMHG | TEMPERATURE: 97.5 F | DIASTOLIC BLOOD PRESSURE: 76 MMHG | HEIGHT: 65 IN | BODY MASS INDEX: 35.96 KG/M2 | OXYGEN SATURATION: 100 % | RESPIRATION RATE: 18 BRPM | WEIGHT: 215.83 LBS

## 2022-02-01 DIAGNOSIS — L73.2 HIDRADENITIS: ICD-10-CM

## 2022-02-01 DIAGNOSIS — T14.8XXA ABRASION: ICD-10-CM

## 2022-02-01 DIAGNOSIS — L73.2 HIDRADENITIS SUPPURATIVA OF LEFT AXILLA: Primary | ICD-10-CM

## 2022-02-01 PROCEDURE — 99282 EMERGENCY DEPT VISIT SF MDM: CPT

## 2022-02-01 PROCEDURE — 96372 THER/PROPH/DIAG INJ SC/IM: CPT

## 2022-02-01 PROCEDURE — 99284 EMERGENCY DEPT VISIT MOD MDM: CPT | Performed by: SURGERY

## 2022-02-01 RX ORDER — NAPROXEN 500 MG/1
500 TABLET ORAL 2 TIMES DAILY WITH MEALS
Qty: 10 TABLET | Refills: 0 | Status: SHIPPED | OUTPATIENT
Start: 2022-02-01 | End: 2022-03-11 | Stop reason: ALTCHOICE

## 2022-02-01 RX ORDER — KETOROLAC TROMETHAMINE 30 MG/ML
15 INJECTION, SOLUTION INTRAMUSCULAR; INTRAVENOUS ONCE
Status: COMPLETED | OUTPATIENT
Start: 2022-02-01 | End: 2022-02-01

## 2022-02-01 RX ADMIN — KETOROLAC TROMETHAMINE 15 MG: 30 INJECTION, SOLUTION INTRAMUSCULAR at 07:04

## 2022-02-01 NOTE — DISCHARGE INSTRUCTIONS
Please return with any new or worsening symptoms, fevers, chills, nausea vomiting, diarrhea, drainage from affected areas  Please follow-up with general surgery and dermatology

## 2022-02-08 ENCOUNTER — CONSULT (OUTPATIENT)
Dept: SURGERY | Facility: CLINIC | Age: 27
End: 2022-02-08
Payer: COMMERCIAL

## 2022-02-08 VITALS
WEIGHT: 200 LBS | SYSTOLIC BLOOD PRESSURE: 108 MMHG | OXYGEN SATURATION: 98 % | RESPIRATION RATE: 16 BRPM | DIASTOLIC BLOOD PRESSURE: 72 MMHG | TEMPERATURE: 98 F | BODY MASS INDEX: 33.32 KG/M2 | HEART RATE: 82 BPM | HEIGHT: 65 IN

## 2022-02-08 DIAGNOSIS — L73.2 HIDRADENITIS SUPPURATIVA: Primary | ICD-10-CM

## 2022-02-08 DIAGNOSIS — L73.2 HIDRADENITIS AXILLARIS: ICD-10-CM

## 2022-02-08 PROCEDURE — 99244 OFF/OP CNSLTJ NEW/EST MOD 40: CPT | Performed by: SURGERY

## 2022-02-08 RX ORDER — SULFAMETHOXAZOLE AND TRIMETHOPRIM 800; 160 MG/1; MG/1
TABLET ORAL 2 TIMES DAILY
COMMUNITY
Start: 2022-02-03 | End: 2022-02-11 | Stop reason: HOSPADM

## 2022-02-08 NOTE — H&P (VIEW-ONLY)
Assessment/Plan:     1  Hidradenitis suppurativa  -     Ambulatory Referral to General Surgery  -     Case request operating room: EXCISIONAL DEBRIDEMENT of bilateral buttocks; Standing  -     CBC and differential; Future  -     Basic metabolic panel; Future  -     Case request operating room: EXCISIONAL DEBRIDEMENT of bilateral buttocks    2  Hidradenitis suppurativa of left axilla      I explained to her that we should start with the area that is the most problematic to her at this time which is the buttocks which gives her difficulty with sitting  It will be difficult to heal from excising multiple sites when it relates to pain so well will address the buttocks and then after some healing and when she is ready we can move to the left axilla which appears to be the next most problematic area  Subjective:      Patient ID: Pauly Samayoa is a 27 y o  female  Triage Notes:    Fabienne Valera is a 28 yo F presenting for hidradenitis  She has been seen in the past by dermatology and used both topical and systemic antibiotics  Has been diagnosed with hidradenitis since 2017 and was seen in the ED 2/1 and 2/3 and prescribed antibiotics  She has been having a hard time sitting and she does have to sit at work and also is in school  The following portions of the patient's history were reviewed and updated as appropriate:   She  has a past medical history of Hidradenitis, History of COVID-19, HPV in female, and Wears glasses  She   Patient Active Problem List    Diagnosis Date Noted    Hidradenitis 02/07/2019    Abrasion 11/13/2018     She  has a past surgical history that includes No past surgeries  Her family history is not on file  She  reports that she has never smoked  She has never used smokeless tobacco  She reports current alcohol use  She reports previous drug use  Drug: Marijuana    Current Outpatient Medications on File Prior to Visit   Medication Sig    naproxen (Naprosyn) 500 mg tablet Take 1 tablet (500 mg total) by mouth 2 (two) times a day with meals for 5 days    sulfamethoxazole-trimethoprim (BACTRIM DS) 800-160 mg per tablet Take by mouth 2 (two) times a day    [DISCONTINUED] clindamycin (CLEOCIN T) 1 % lotion Apply topically daily To axilla and groin until improved    [DISCONTINUED] clotrimazole (LOTRIMIN) 1 % cream Apply to affected area 2 times daily for 4 weeks     No current facility-administered medications on file prior to visit  She is allergic to oxycodone-acetaminophen and percolone [oxycodone]       Review of Systems   Constitutional: Negative for activity change and appetite change  HENT: Negative for congestion, hearing loss, sore throat and trouble swallowing  Eyes: Negative for discharge and visual disturbance  Respiratory: Negative for cough, chest tightness, shortness of breath and wheezing  Cardiovascular: Negative for chest pain and palpitations  Gastrointestinal: Negative for abdominal pain  Endocrine: Negative for cold intolerance and heat intolerance  Genitourinary: Negative for difficulty urinating  Musculoskeletal: Negative for gait problem  Skin: Positive for wound  Negative for color change and rash  Allergic/Immunologic: Negative for immunocompromised state  Neurological: Negative for dizziness, speech difficulty and headaches  Psychiatric/Behavioral: Negative for behavioral problems and confusion  The patient is not nervous/anxious  Objective:      /72   Pulse 82   Temp 98 °F (36 7 °C) (Temporal)   Resp 16   Ht 5' 5" (1 651 m)   Wt 90 7 kg (200 lb)   LMP 01/23/2022   SpO2 98%   BMI 33 28 kg/m²     Below is the patient's most recent value for Albumin, ALT, AST, BUN, Calcium, Chloride, Cholesterol, CO2, Creatinine, GFR, Glucose, HDL, Hematocrit, Hemoglobin, Hemoglobin A1C, LDL, Magnesium, Phosphorus, Platelets, Potassium, PSA, Sodium, Triglycerides, and WBC     Lab Results   Component Value Date    BUN 8 10/27/2017 CALCIUM 9 0 10/27/2017     10/27/2017    CO2 23 10/27/2017    CREATININE 0 88 10/27/2017    HCT 42 0 10/27/2017    HGB 13 3 10/27/2017     10/27/2017    K 3 7 10/27/2017    WBC 10 77 (H) 10/27/2017     Note: for a comprehensive list of the patient's lab results, access the Results Review activity  Physical Exam  Vitals and nursing note reviewed  Constitutional:       General: She is not in acute distress  Appearance: She is well-developed  She is not diaphoretic  HENT:      Head: Normocephalic and atraumatic  Mouth/Throat:      Mouth: Mucous membranes are moist    Eyes:      General: No scleral icterus  Pupils: Pupils are equal, round, and reactive to light  Cardiovascular:      Rate and Rhythm: Normal rate and regular rhythm  Pulmonary:      Effort: Pulmonary effort is normal  No respiratory distress  Abdominal:      Palpations: Abdomen is soft  Musculoskeletal:         General: Normal range of motion  Cervical back: Normal range of motion and neck supple  Skin:     General: Skin is warm and dry  Comments: She had draining sinuses with hidradenitis which is worse on the left axilla than the right  There are a few scattered punctate wounds in the mons and labia majora and two large draining wounds of bilateral buttocks  Neurological:      Mental Status: She is alert and oriented to person, place, and time  Psychiatric:         Mood and Affect: Mood normal          Behavior: Behavior normal          Thought Content:  Thought content normal          Judgment: Judgment normal              Procedures

## 2022-02-08 NOTE — PROGRESS NOTES
Assessment/Plan:     1  Hidradenitis suppurativa  -     Ambulatory Referral to General Surgery  -     Case request operating room: EXCISIONAL DEBRIDEMENT of bilateral buttocks; Standing  -     CBC and differential; Future  -     Basic metabolic panel; Future  -     Case request operating room: EXCISIONAL DEBRIDEMENT of bilateral buttocks    2  Hidradenitis suppurativa of left axilla      I explained to her that we should start with the area that is the most problematic to her at this time which is the buttocks which gives her difficulty with sitting  It will be difficult to heal from excising multiple sites when it relates to pain so well will address the buttocks and then after some healing and when she is ready we can move to the left axilla which appears to be the next most problematic area  Subjective:      Patient ID: Ludwig Shipman is a 27 y o  female  Triage Notes:    Matilde Kim is a 26 yo F presenting for hidradenitis  She has been seen in the past by dermatology and used both topical and systemic antibiotics  Has been diagnosed with hidradenitis since 2017 and was seen in the ED 2/1 and 2/3 and prescribed antibiotics  She has been having a hard time sitting and she does have to sit at work and also is in school  The following portions of the patient's history were reviewed and updated as appropriate:   She  has a past medical history of Hidradenitis, History of COVID-19, HPV in female, and Wears glasses  She   Patient Active Problem List    Diagnosis Date Noted    Hidradenitis 02/07/2019    Abrasion 11/13/2018     She  has a past surgical history that includes No past surgeries  Her family history is not on file  She  reports that she has never smoked  She has never used smokeless tobacco  She reports current alcohol use  She reports previous drug use  Drug: Marijuana    Current Outpatient Medications on File Prior to Visit   Medication Sig    naproxen (Naprosyn) 500 mg tablet Take 1 tablet (500 mg total) by mouth 2 (two) times a day with meals for 5 days    sulfamethoxazole-trimethoprim (BACTRIM DS) 800-160 mg per tablet Take by mouth 2 (two) times a day    [DISCONTINUED] clindamycin (CLEOCIN T) 1 % lotion Apply topically daily To axilla and groin until improved    [DISCONTINUED] clotrimazole (LOTRIMIN) 1 % cream Apply to affected area 2 times daily for 4 weeks     No current facility-administered medications on file prior to visit  She is allergic to oxycodone-acetaminophen and percolone [oxycodone]       Review of Systems   Constitutional: Negative for activity change and appetite change  HENT: Negative for congestion, hearing loss, sore throat and trouble swallowing  Eyes: Negative for discharge and visual disturbance  Respiratory: Negative for cough, chest tightness, shortness of breath and wheezing  Cardiovascular: Negative for chest pain and palpitations  Gastrointestinal: Negative for abdominal pain  Endocrine: Negative for cold intolerance and heat intolerance  Genitourinary: Negative for difficulty urinating  Musculoskeletal: Negative for gait problem  Skin: Positive for wound  Negative for color change and rash  Allergic/Immunologic: Negative for immunocompromised state  Neurological: Negative for dizziness, speech difficulty and headaches  Psychiatric/Behavioral: Negative for behavioral problems and confusion  The patient is not nervous/anxious  Objective:      /72   Pulse 82   Temp 98 °F (36 7 °C) (Temporal)   Resp 16   Ht 5' 5" (1 651 m)   Wt 90 7 kg (200 lb)   LMP 01/23/2022   SpO2 98%   BMI 33 28 kg/m²     Below is the patient's most recent value for Albumin, ALT, AST, BUN, Calcium, Chloride, Cholesterol, CO2, Creatinine, GFR, Glucose, HDL, Hematocrit, Hemoglobin, Hemoglobin A1C, LDL, Magnesium, Phosphorus, Platelets, Potassium, PSA, Sodium, Triglycerides, and WBC     Lab Results   Component Value Date    BUN 8 10/27/2017 CALCIUM 9 0 10/27/2017     10/27/2017    CO2 23 10/27/2017    CREATININE 0 88 10/27/2017    HCT 42 0 10/27/2017    HGB 13 3 10/27/2017     10/27/2017    K 3 7 10/27/2017    WBC 10 77 (H) 10/27/2017     Note: for a comprehensive list of the patient's lab results, access the Results Review activity  Physical Exam  Vitals and nursing note reviewed  Constitutional:       General: She is not in acute distress  Appearance: She is well-developed  She is not diaphoretic  HENT:      Head: Normocephalic and atraumatic  Mouth/Throat:      Mouth: Mucous membranes are moist    Eyes:      General: No scleral icterus  Pupils: Pupils are equal, round, and reactive to light  Cardiovascular:      Rate and Rhythm: Normal rate and regular rhythm  Pulmonary:      Effort: Pulmonary effort is normal  No respiratory distress  Abdominal:      Palpations: Abdomen is soft  Musculoskeletal:         General: Normal range of motion  Cervical back: Normal range of motion and neck supple  Skin:     General: Skin is warm and dry  Comments: She had draining sinuses with hidradenitis which is worse on the left axilla than the right  There are a few scattered punctate wounds in the mons and labia majora and two large draining wounds of bilateral buttocks  Neurological:      Mental Status: She is alert and oriented to person, place, and time  Psychiatric:         Mood and Affect: Mood normal          Behavior: Behavior normal          Thought Content:  Thought content normal          Judgment: Judgment normal              Procedures

## 2022-02-09 RX ORDER — PROPRANOLOL/HYDROCHLOROTHIAZID 40 MG-25MG
TABLET ORAL DAILY
COMMUNITY

## 2022-02-09 RX ORDER — ACETAMINOPHEN 500 MG
500-1000 TABLET ORAL EVERY 6 HOURS PRN
COMMUNITY

## 2022-02-10 ENCOUNTER — TELEPHONE (OUTPATIENT)
Dept: SURGERY | Facility: CLINIC | Age: 27
End: 2022-02-10

## 2022-02-10 ENCOUNTER — ANESTHESIA EVENT (OUTPATIENT)
Dept: PERIOP | Facility: HOSPITAL | Age: 27
End: 2022-02-10
Payer: COMMERCIAL

## 2022-02-10 ENCOUNTER — APPOINTMENT (OUTPATIENT)
Dept: LAB | Facility: HOSPITAL | Age: 27
End: 2022-02-10
Payer: COMMERCIAL

## 2022-02-10 DIAGNOSIS — L73.2 HIDRADENITIS SUPPURATIVA: ICD-10-CM

## 2022-02-10 LAB
ANION GAP SERPL CALCULATED.3IONS-SCNC: 7 MMOL/L (ref 4–13)
BASOPHILS # BLD AUTO: 0.01 THOUSANDS/ΜL (ref 0–0.1)
BASOPHILS NFR BLD AUTO: 0 % (ref 0–1)
BUN SERPL-MCNC: 10 MG/DL (ref 5–25)
CALCIUM SERPL-MCNC: 9.4 MG/DL (ref 8.3–10.1)
CHLORIDE SERPL-SCNC: 103 MMOL/L (ref 100–108)
CO2 SERPL-SCNC: 27 MMOL/L (ref 21–32)
CREAT SERPL-MCNC: 0.82 MG/DL (ref 0.6–1.3)
EOSINOPHIL # BLD AUTO: 0.3 THOUSAND/ΜL (ref 0–0.61)
EOSINOPHIL NFR BLD AUTO: 5 % (ref 0–6)
ERYTHROCYTE [DISTWIDTH] IN BLOOD BY AUTOMATED COUNT: 15 % (ref 11.6–15.1)
GFR SERPL CREATININE-BSD FRML MDRD: 100 ML/MIN/1.73SQ M
GLUCOSE P FAST SERPL-MCNC: 90 MG/DL (ref 65–99)
HCT VFR BLD AUTO: 35.4 % (ref 34.8–46.1)
HGB BLD-MCNC: 10.9 G/DL (ref 11.5–15.4)
IMM GRANULOCYTES # BLD AUTO: 0.02 THOUSAND/UL (ref 0–0.2)
IMM GRANULOCYTES NFR BLD AUTO: 0 % (ref 0–2)
LYMPHOCYTES # BLD AUTO: 1.77 THOUSANDS/ΜL (ref 0.6–4.47)
LYMPHOCYTES NFR BLD AUTO: 27 % (ref 14–44)
MCH RBC QN AUTO: 25 PG (ref 26.8–34.3)
MCHC RBC AUTO-ENTMCNC: 30.8 G/DL (ref 31.4–37.4)
MCV RBC AUTO: 81 FL (ref 82–98)
MONOCYTES # BLD AUTO: 0.54 THOUSAND/ΜL (ref 0.17–1.22)
MONOCYTES NFR BLD AUTO: 8 % (ref 4–12)
NEUTROPHILS # BLD AUTO: 3.92 THOUSANDS/ΜL (ref 1.85–7.62)
NEUTS SEG NFR BLD AUTO: 60 % (ref 43–75)
NRBC BLD AUTO-RTO: 0 /100 WBCS
PLATELET # BLD AUTO: 413 THOUSANDS/UL (ref 149–390)
PMV BLD AUTO: 10.4 FL (ref 8.9–12.7)
POTASSIUM SERPL-SCNC: 4 MMOL/L (ref 3.5–5.3)
RBC # BLD AUTO: 4.36 MILLION/UL (ref 3.81–5.12)
SODIUM SERPL-SCNC: 137 MMOL/L (ref 136–145)
WBC # BLD AUTO: 6.56 THOUSAND/UL (ref 4.31–10.16)

## 2022-02-10 PROCEDURE — 85025 COMPLETE CBC W/AUTO DIFF WBC: CPT

## 2022-02-10 PROCEDURE — 36415 COLL VENOUS BLD VENIPUNCTURE: CPT

## 2022-02-10 PROCEDURE — 80048 BASIC METABOLIC PNL TOTAL CA: CPT

## 2022-02-10 NOTE — TELEPHONE ENCOUNTER
Pt is scheduled for a procedure with Dr Orlan Krabbe tomorrow 02/11/22  This procedure requires anesthesia  Pt is aware and was informed to get the labs done no later than 24 hrs prior to surgery  It is now 1:43pm and the labs are still not done  Anesthesia is not going to move forward with them  I left pt a detailed message that if the labs were not done today before 3:30pm I will have to be forced to reschedule her procedure

## 2022-02-11 ENCOUNTER — ANESTHESIA (OUTPATIENT)
Dept: PERIOP | Facility: HOSPITAL | Age: 27
End: 2022-02-11
Payer: COMMERCIAL

## 2022-02-11 ENCOUNTER — TELEPHONE (OUTPATIENT)
Dept: SURGERY | Facility: CLINIC | Age: 27
End: 2022-02-11

## 2022-02-11 ENCOUNTER — HOSPITAL ENCOUNTER (OUTPATIENT)
Facility: HOSPITAL | Age: 27
Setting detail: OUTPATIENT SURGERY
Discharge: HOME | End: 2022-02-11
Attending: SURGERY | Admitting: SURGERY
Payer: COMMERCIAL

## 2022-02-11 VITALS
OXYGEN SATURATION: 100 % | HEART RATE: 63 BPM | TEMPERATURE: 97.8 F | BODY MASS INDEX: 32.6 KG/M2 | RESPIRATION RATE: 21 BRPM | SYSTOLIC BLOOD PRESSURE: 124 MMHG | HEIGHT: 66 IN | WEIGHT: 202.82 LBS | DIASTOLIC BLOOD PRESSURE: 83 MMHG

## 2022-02-11 DIAGNOSIS — L73.2 HIDRADENITIS SUPPURATIVA OF LEFT AXILLA: ICD-10-CM

## 2022-02-11 DIAGNOSIS — L73.2 HIDRADENITIS: Primary | ICD-10-CM

## 2022-02-11 LAB
EXT PREGNANCY TEST URINE: NEGATIVE
EXT. CONTROL: NORMAL

## 2022-02-11 PROCEDURE — 88304 TISSUE EXAM BY PATHOLOGIST: CPT | Performed by: PATHOLOGY

## 2022-02-11 PROCEDURE — 11042 DBRDMT SUBQ TIS 1ST 20SQCM/<: CPT | Performed by: SURGERY

## 2022-02-11 PROCEDURE — 81025 URINE PREGNANCY TEST: CPT | Performed by: ANESTHESIOLOGY

## 2022-02-11 PROCEDURE — 87205 SMEAR GRAM STAIN: CPT | Performed by: SURGERY

## 2022-02-11 PROCEDURE — 87070 CULTURE OTHR SPECIMN AEROBIC: CPT | Performed by: SURGERY

## 2022-02-11 PROCEDURE — 87185 SC STD ENZYME DETCJ PER NZM: CPT | Performed by: SURGERY

## 2022-02-11 PROCEDURE — 11470 EXC SKN H/P/P/U SMPL/NTRM: CPT | Performed by: SURGERY

## 2022-02-11 PROCEDURE — 87075 CULTR BACTERIA EXCEPT BLOOD: CPT | Performed by: SURGERY

## 2022-02-11 PROCEDURE — 87076 CULTURE ANAEROBE IDENT EACH: CPT | Performed by: SURGERY

## 2022-02-11 PROCEDURE — 87077 CULTURE AEROBIC IDENTIFY: CPT | Performed by: SURGERY

## 2022-02-11 RX ORDER — CLINDAMYCIN PHOSPHATE 900 MG/50ML
INJECTION INTRAVENOUS AS NEEDED
Status: DISCONTINUED | OUTPATIENT
Start: 2022-02-11 | End: 2022-02-11

## 2022-02-11 RX ORDER — HYDROCODONE BITARTRATE AND ACETAMINOPHEN 5; 325 MG/1; MG/1
1 TABLET ORAL EVERY 6 HOURS PRN
Qty: 15 TABLET | Refills: 0 | Status: SHIPPED | OUTPATIENT
Start: 2022-02-11 | End: 2022-02-21

## 2022-02-11 RX ORDER — MIDAZOLAM HYDROCHLORIDE 2 MG/2ML
INJECTION, SOLUTION INTRAMUSCULAR; INTRAVENOUS AS NEEDED
Status: DISCONTINUED | OUTPATIENT
Start: 2022-02-11 | End: 2022-02-11

## 2022-02-11 RX ORDER — LIDOCAINE HYDROCHLORIDE 10 MG/ML
INJECTION, SOLUTION EPIDURAL; INFILTRATION; INTRACAUDAL; PERINEURAL AS NEEDED
Status: DISCONTINUED | OUTPATIENT
Start: 2022-02-11 | End: 2022-02-11 | Stop reason: HOSPADM

## 2022-02-11 RX ORDER — SODIUM CHLORIDE, SODIUM LACTATE, POTASSIUM CHLORIDE, CALCIUM CHLORIDE 600; 310; 30; 20 MG/100ML; MG/100ML; MG/100ML; MG/100ML
125 INJECTION, SOLUTION INTRAVENOUS CONTINUOUS
Status: DISCONTINUED | OUTPATIENT
Start: 2022-02-11 | End: 2022-02-11 | Stop reason: HOSPADM

## 2022-02-11 RX ORDER — CLINDAMYCIN PHOSPHATE 900 MG/50ML
900 INJECTION INTRAVENOUS ONCE
Status: DISCONTINUED | OUTPATIENT
Start: 2022-02-11 | End: 2022-02-11 | Stop reason: HOSPADM

## 2022-02-11 RX ORDER — FENTANYL CITRATE 50 UG/ML
INJECTION, SOLUTION INTRAMUSCULAR; INTRAVENOUS AS NEEDED
Status: DISCONTINUED | OUTPATIENT
Start: 2022-02-11 | End: 2022-02-11

## 2022-02-11 RX ORDER — PROPOFOL 10 MG/ML
INJECTION, EMULSION INTRAVENOUS AS NEEDED
Status: DISCONTINUED | OUTPATIENT
Start: 2022-02-11 | End: 2022-02-11

## 2022-02-11 RX ORDER — DOCUSATE SODIUM 100 MG/1
100 CAPSULE, LIQUID FILLED ORAL 2 TIMES DAILY PRN
Qty: 10 CAPSULE | Refills: 0 | Status: SHIPPED | OUTPATIENT
Start: 2022-02-11

## 2022-02-11 RX ORDER — BUPIVACAINE HYDROCHLORIDE 2.5 MG/ML
INJECTION, SOLUTION EPIDURAL; INFILTRATION; INTRACAUDAL AS NEEDED
Status: DISCONTINUED | OUTPATIENT
Start: 2022-02-11 | End: 2022-02-11 | Stop reason: HOSPADM

## 2022-02-11 RX ORDER — DEXAMETHASONE SODIUM PHOSPHATE 4 MG/ML
INJECTION, SOLUTION INTRA-ARTICULAR; INTRALESIONAL; INTRAMUSCULAR; INTRAVENOUS; SOFT TISSUE AS NEEDED
Status: DISCONTINUED | OUTPATIENT
Start: 2022-02-11 | End: 2022-02-11

## 2022-02-11 RX ORDER — ONDANSETRON 2 MG/ML
INJECTION INTRAMUSCULAR; INTRAVENOUS AS NEEDED
Status: DISCONTINUED | OUTPATIENT
Start: 2022-02-11 | End: 2022-02-11

## 2022-02-11 RX ORDER — ONDANSETRON 2 MG/ML
4 INJECTION INTRAMUSCULAR; INTRAVENOUS ONCE AS NEEDED
Status: DISCONTINUED | OUTPATIENT
Start: 2022-02-11 | End: 2022-02-11 | Stop reason: HOSPADM

## 2022-02-11 RX ORDER — LIDOCAINE HYDROCHLORIDE 20 MG/ML
INJECTION, SOLUTION EPIDURAL; INFILTRATION; INTRACAUDAL; PERINEURAL AS NEEDED
Status: DISCONTINUED | OUTPATIENT
Start: 2022-02-11 | End: 2022-02-11

## 2022-02-11 RX ORDER — MORPHINE SULFATE 4 MG/ML
2 INJECTION, SOLUTION INTRAMUSCULAR; INTRAVENOUS
Status: DISCONTINUED | OUTPATIENT
Start: 2022-02-11 | End: 2022-02-11 | Stop reason: HOSPADM

## 2022-02-11 RX ORDER — ROCURONIUM BROMIDE 10 MG/ML
INJECTION, SOLUTION INTRAVENOUS AS NEEDED
Status: DISCONTINUED | OUTPATIENT
Start: 2022-02-11 | End: 2022-02-11

## 2022-02-11 RX ORDER — FENTANYL CITRATE/PF 50 MCG/ML
50 SYRINGE (ML) INJECTION
Status: DISCONTINUED | OUTPATIENT
Start: 2022-02-11 | End: 2022-02-11 | Stop reason: HOSPADM

## 2022-02-11 RX ADMIN — ROCURONIUM BROMIDE 40 MG: 10 INJECTION, SOLUTION INTRAVENOUS at 10:43

## 2022-02-11 RX ADMIN — LIDOCAINE HYDROCHLORIDE 5 ML: 20 INJECTION, SOLUTION EPIDURAL; INFILTRATION; INTRACAUDAL; PERINEURAL at 10:41

## 2022-02-11 RX ADMIN — SODIUM CHLORIDE, SODIUM LACTATE, POTASSIUM CHLORIDE, AND CALCIUM CHLORIDE: .6; .31; .03; .02 INJECTION, SOLUTION INTRAVENOUS at 10:21

## 2022-02-11 RX ADMIN — MIDAZOLAM HYDROCHLORIDE 2 MG: 1 INJECTION, SOLUTION INTRAMUSCULAR; INTRAVENOUS at 10:18

## 2022-02-11 RX ADMIN — ONDANSETRON 4 MG: 2 INJECTION INTRAMUSCULAR; INTRAVENOUS at 11:00

## 2022-02-11 RX ADMIN — FENTANYL CITRATE 100 MCG: 50 INJECTION, SOLUTION INTRAMUSCULAR; INTRAVENOUS at 10:43

## 2022-02-11 RX ADMIN — CLINDAMYCIN PHOSPHATE 900 MG: 18 INJECTION, SOLUTION INTRAMUSCULAR; INTRAVENOUS at 10:36

## 2022-02-11 RX ADMIN — SUGAMMADEX 200 MG: 100 INJECTION, SOLUTION INTRAVENOUS at 11:18

## 2022-02-11 RX ADMIN — PROPOFOL 200 MG: 10 INJECTION, EMULSION INTRAVENOUS at 10:43

## 2022-02-11 RX ADMIN — DEXAMETHASONE SODIUM PHOSPHATE 4 MG: 4 INJECTION, SOLUTION INTRAMUSCULAR; INTRAVENOUS at 10:52

## 2022-02-11 NOTE — ANESTHESIA POSTPROCEDURE EVALUATION
Post-Op Assessment Note    CV Status:  Stable    Pain management: adequate     Mental Status:  Alert and awake   Hydration Status:  Euvolemic   PONV Controlled:  Controlled   Airway Patency:  Patent      Post Op Vitals Reviewed: Yes      Staff: CRNA         No complications documented      BP   132/71   Temp   97 2   Pulse  101   Resp   24   SpO2   100

## 2022-02-11 NOTE — INTERVAL H&P NOTE
H&P reviewed  After examining the patient I find no changes in the patients condition since the H&P had been written      Vitals:    02/11/22 0948   BP: 116/63   Pulse: 64   Resp: 18   Temp: 97 8 °F (36 6 °C)   SpO2: 100%

## 2022-02-11 NOTE — ANESTHESIA PREPROCEDURE EVALUATION
Procedure:  EXCISIONAL DEBRIDEMENT of bilateral buttocks (Bilateral Buttocks)    Relevant Problems   No relevant active problems        Physical Exam    Airway    Mallampati score: III  TM Distance: >3 FB  Neck ROM: full     Dental   No notable dental hx     Cardiovascular  Rhythm: regular, Rate: normal, No murmur, Cardiovascular exam normal    Pulmonary  Pulmonary exam normal Breath sounds clear to auscultation, No wheezes,     Other Findings  "half bottle" of water at 8:30 (approx 10 oz )      Anesthesia Plan  ASA Score- 2     Anesthesia Type- general with ASA Monitors  Additional Monitors:   Airway Plan: ETT  Plan Factors-Exercise tolerance (METS): >4 METS  Chart reviewed  EKG reviewed  Existing labs reviewed  Patient is not a current smoker  Patient instructed to abstain from smoking on day of procedure  Patient did not smoke on day of surgery  There is medical exclusion for perioperative obstructive sleep apnea risk education  Induction-     Postoperative Plan-     Informed Consent- Anesthetic plan and risks discussed with patient  I personally reviewed this patient with the CRNA  Discussed and agreed on the Anesthesia Plan with the CRNA  Jaun Hurley

## 2022-02-11 NOTE — OP NOTE
PERATIVE REPORT  PATIENT NAME: Maria Isabel Galvan    :  1995  MRN: [id number]  Pt Location: [facility] OR ROOM 02    SURGERY DATE: 2022    Surgeon(s) and Role:     * Cherri Maya MD - Primary    Preop Diagnosis:  Hidradenitis suppurativa of left axilla [L73 2]    Post-Op Diagnosis Codes:     * Hidradenitis suppurativa of left axilla [L73 2]    Procedure(s) (LRB):  EXCISIONAL DEBRIDEMENT of bilateral buttocks (Bilateral)    Specimen(s):  ID Type Source Tests Collected by Time Destination   1 : excision of hidradenitis- buttock  Tissue Buttock TISSUE EXAM Cherri Maya MD 2022 1116    A : buttock abscess culture  Tissue Buttock ANAEROBIC CULTURE AND GRAM STAIN, CULTURE, TISSUE AND GRAM STAIN Cherri Maya MD 2022 1106        Estimated Blood Loss:   Minimal    Drains:  * No LDAs found *    Anesthesia Type:   General/LMA    Operative Indications:  Hidradenitis suppurativa of axilla, groin and buttocks/perineum      Operative Findings:  Right buttock 4 5 x 4 cm  1 5 x 1 cm  Left buttock 4 x 3cm (tunneled medially to a draining punctum which was cultured)    Complications:   None    Procedure and Technique:    The patient was seen in preop holding where the location of the lesion was confirmed and marked  The H&P was updated and the procedure reviewed with the patient  The patient was then brought to the OR and placed in supine  position  The patient was sedated and intubated  Reinaldo Heckler She was placed prone  The site was prepped and draped in sterile fashion  A preincision time out was initiated by myself and confirmed the patient, procedure, site/side and any applicable marking as well as preoperative antibiotics  The skin and subcutaneous tissue was then anesthetized with 1% lidocaine and 0 25% marcaine mixed 1:1   The right buttock wounds that existed were debrided from a size of 4 x 4 cm to 4 x 4 5 x 0 5 cm and the smaller wound was 1 x 1 to 1 5 x 1 x 0 4 cm both wounds were into the subcutaneous tissue  On the left buttock there was a raised cystic lesion and the skin and subcutaneous tissue was excised 3 x 3 cm and there was a subdermal tunnel toward a medial punctum that was draining purulent fluid  The tunnel was then opened by an additional 1 cm medially to a size of 4 x 3 x 0 5 cm  The lesions were packed with surgicele for hemostasis in order to prevent necrosis  Abd was placed and mesh shorts          I was present for the entire procedure and A qualified resident physician was not available    Patient Disposition:  PACU  and extubated and stable      SIGNATURE: Diana Johnston MD  DATE: February 11, 2022  TIME: 11:31 AM

## 2022-02-11 NOTE — DISCHARGE INSTRUCTIONS
Clean the area well after bowel movements  If you are having difficulty or unsure if the area is cleaned of stool take a shower after bowel movements  Use pain medication sparingly as needed when pain is severe  Keep a clean dry dressing on the wounds  For the first 3 days avoid prolonged sitting but if you need to rest on your bottom for a few minutes this is not a problem  Hidradenitis Suppurativa   AMBULATORY CARE:     Hidradenitis suppurativa  (HS) is a chronic (long-term) skin disease that causes your sweat glands or hair follicles to get clogged and inflamed  HS causes red bumps that look like pimples or small boils to develop on your skin  The cause of HS is unknown  It may run in families  Being overweight and smoking worsens signs and symptoms of HS  Signs and symptoms of HS:  HS usually occurs in areas around skin folds or where sweat glands or hair follicles are  This includes the armpits, groin, genital or anal area, and under breasts in women  Your signs and symptoms may come and go  They may also get worse over time  You may have any of the following:  · Mild or early signs of HS:  One or more tender, red bumps that look like pimples or boils    · Worsening signs and symptoms of HS:      ? Painful, hard bumps that get larger, break open, and drain pus that smells bad    ? Bumps that form deep under your skin and connect to each other and form a tunnel     ? Deep, thick scars caused by bumps that heal and reappear over time in the same area    Contact your healthcare provider if:   · Your symptoms get worse, even with treatment  · You have questions or concerns about your condition or care  Treatment:  The goal of treatment is to control symptoms, prevent the development of new bumps, and limit scarring  You may need any of the following:  · Antibiotics  are used to treat or prevent a bacterial infection  Antibiotics may be used long-term   Antibiotics may be given as a cream or pill      · NSAIDs , such as ibuprofen, help decrease swelling, pain, and fever  This medicine is available with or without a doctor's order  NSAIDs can cause stomach bleeding or kidney problems in certain people  If you take blood thinner medicine, always ask your healthcare provider if NSAIDs are safe for you  Always read the medicine label and follow directions  · Acetaminophen  decreases pain and fever  It is available without a doctor's order  Ask how much to take and how often to take it  Follow directions  Acetaminophen can cause liver damage if not taken correctly  · Other medicines  may be used to treat HS  These may include hormones, acne medicines, steroids, biologic therapy, and medicines that slow your immune system  · Incision and drainage  is a procedure to drain pus from an HS wound and clean it out so it can heal     · Surgery  may be needed if medicines do not work  Surgery may be done to remove areas of skin affected by HS  Manage HS symptoms and decrease flare-ups:   · Apply warm, moist compresses  This may help to decrease pain  Keep the compress on your skin for 10 minutes  Sitz baths may be recommended if your genital or anal area is affected by HS  To do a sitz bath, fill a bathtub with 4 to 6 inches of warm water  You may also use a sitz bath pan that fits inside a toilet bowl  Sit in the sitz bath for 15 minutes  Do this 3 times a day, and after each bowel movement  The warm water can help decrease pain and swelling  · Wash your skin gently  Use cleansers recommended by your healthcare provider  Antibacterial soap may be helpful  · Lose weight if you are overweight  Weight loss may help to improve signs and symptoms of HS  · Do not smoke  Smoking can make it more difficult to treat HS and worsen symptoms  Ask your healthcare provider for information if you currently smoke and need help to quit  E-cigarettes or smokeless tobacco still contain nicotine   Talk to your healthcare provider before you use these products  · Do not wear tight clothing  Tight clothing rubs against your skin and causes irritation that can worsen HS  · Do not shave or use deodorant in areas of skin affected by HS  Ask your healthcare provider about safe deodorant or hair removal options  · Keep your skin cool  Overheating and sweating can cause an HS flare-up  · Ask your healthcare provider if you should make any changes to the foods you eat  Your healthcare provider may recommend that you avoid dairy foods  A dairy-free diet may help decrease your symptoms  Dairy foods include milk, cheese, yogurt, and ice cream      · Tell your healthcare provider if HS is causing you to feel depressed  Your healthcare provider may recommend counseling to help you cope with HS  Follow up with your doctor as directed:  Write down your questions so you remember to ask them during your visits  © Copyright [employer] 2021 Information is for End User's use only and may not be sold, redistributed or otherwise used for commercial purposes  All illustrations and images included in CareNotes® are the copyrighted property of A D A M , Inc  or Nahed Upton   The above information is an  only  It is not intended as medical advice for individual conditions or treatments  Talk to your doctor, nurse or pharmacist before following any medical regimen to see if it is safe and effective for you

## 2022-02-11 NOTE — TELEPHONE ENCOUNTER
Patient had surgery today with Dr Genie Leventhal called asking if they would send a note to his employer at [employer] for the time he was out of work  I [employer] [address] and she wanted to inform him she would send the note by the end of the day  Patient understood

## 2022-02-11 NOTE — ED PROVIDER NOTES
History  Chief Complaint   Patient presents with    Cyst     cyst on tailbone, hx of same  drainage and pain 8/10 otc with minimal relief     Davey Beltran is a 27 y o  female with a pertinent past medical history of hidradenitis presenting today with pain under her armpits  Patient has had cysts to her groin area that have been getting progressively worse  Patient with some drainage  Taken over-the-counter medications with minimal relief  Has not followed up with general surgery, has follow-up with dermatology  Denies any fevers, chills  Denies any chest pain, shortness of breath, lightheadedness, dizziness, nausea, vomiting, diarrhea, fevers, chills, numbness, tingling, weakness in the extremities  No further complaints at this time          Prior to Admission Medications   Prescriptions Last Dose Informant Patient Reported? Taking?   naproxen (NAPROSYN) 500 mg tablet   No No   Sig: Take 1 tablet by mouth 2 (two) times a day with meals for 10 days      Facility-Administered Medications: None       Past Medical History:   Diagnosis Date    Hidradenitis     gali buttocks and axilla - open areas - draining intermittently    History of COVID-19     12/21/21- no hospitalization    HPV in female     Wears glasses        Past Surgical History:   Procedure Laterality Date    NO PAST SURGERIES         History reviewed  No pertinent family history  I have reviewed and agree with the history as documented  E-Cigarette/Vaping     E-Cigarette/Vaping Substances     Social History     Tobacco Use    Smoking status: Never Smoker    Smokeless tobacco: Never Used   Substance Use Topics    Alcohol use: Yes     Comment: socially    Drug use: Not Currently     Types: Marijuana       Review of Systems   Constitutional: Negative for activity change, chills, diaphoresis and fever  HENT: Negative for congestion, ear discharge, ear pain, rhinorrhea, sore throat and trouble swallowing      Eyes: Negative for pain, discharge, redness and visual disturbance  Respiratory: Negative for cough, chest tightness, shortness of breath and wheezing  Cardiovascular: Negative for chest pain, palpitations and leg swelling  Gastrointestinal: Negative for abdominal distention, abdominal pain, blood in stool, constipation, diarrhea, nausea and vomiting  Genitourinary: Negative for difficulty urinating, dysuria, flank pain, frequency, hematuria and urgency  Musculoskeletal: Negative for arthralgias, myalgias, neck pain and neck stiffness  Skin: Positive for wound  Negative for color change and rash  Neurological: Negative for dizziness, syncope, facial asymmetry, weakness, light-headedness, numbness and headaches  Physical Exam  Physical Exam  Constitutional:       General: She is not in acute distress  Appearance: Normal appearance  She is not ill-appearing  HENT:      Head: Normocephalic and atraumatic  Right Ear: Tympanic membrane normal       Left Ear: Tympanic membrane normal       Nose: Nose normal  No congestion or rhinorrhea  Mouth/Throat:      Mouth: Mucous membranes are moist       Pharynx: Oropharynx is clear  No oropharyngeal exudate or posterior oropharyngeal erythema  Eyes:      Extraocular Movements: Extraocular movements intact  Conjunctiva/sclera: Conjunctivae normal       Pupils: Pupils are equal, round, and reactive to light  Cardiovascular:      Rate and Rhythm: Normal rate and regular rhythm  Pulses: Normal pulses  Heart sounds: Normal heart sounds  Pulmonary:      Effort: Pulmonary effort is normal  No respiratory distress  Breath sounds: Normal breath sounds  No wheezing  Abdominal:      General: Abdomen is flat  Bowel sounds are normal  There is no distension  Palpations: Abdomen is soft  There is no mass  Tenderness: There is no abdominal tenderness  There is no right CVA tenderness, left CVA tenderness or guarding        Hernia: No hernia is present  Musculoskeletal:         General: No swelling, tenderness or deformity  Normal range of motion  Cervical back: Normal range of motion and neck supple  No rigidity or tenderness  Right lower leg: No edema  Left lower leg: No edema  Skin:     General: Skin is warm and dry  Capillary Refill: Capillary refill takes less than 2 seconds  Coloration: Skin is not jaundiced  Findings: Erythema present  No rash  Comments: Tenderness to palpation over the left axilla  Neurological:      General: No focal deficit present  Mental Status: She is alert and oriented to person, place, and time  Cranial Nerves: No cranial nerve deficit  Motor: No weakness  Gait: Gait normal          Vital Signs  ED Triage Vitals   Temperature Pulse Respirations Blood Pressure SpO2   02/01/22 0450 02/01/22 0447 02/01/22 0447 02/01/22 0447 02/01/22 0447   97 5 °F (36 4 °C) 65 18 136/76 100 %      Temp Source Heart Rate Source Patient Position - Orthostatic VS BP Location FiO2 (%)   02/01/22 0450 02/01/22 0447 -- 02/01/22 0447 --   Oral Monitor  Right arm       Pain Score       02/01/22 0447       8           Vitals:    02/01/22 0447   BP: 136/76   Pulse: 65         Visual Acuity      ED Medications  Medications   ketorolac (TORADOL) injection 15 mg (15 mg Intramuscular Given 2/1/22 1041)       Diagnostic Studies  Results Reviewed     None                 No orders to display              Procedures  Procedures         ED Course                               SBIRT 22yo+      Most Recent Value   SBIRT (23 yo +)    In order to provide better care to our patients, we are screening all of our patients for alcohol and drug use  Would it be okay to ask you these screening questions? Yes Filed at: 02/01/2022 0740   Initial Alcohol Screen: US AUDIT-C     1  How often do you have a drink containing alcohol? 0 Filed at: 02/01/2022 0740   2   How many drinks containing alcohol do you have on a typical day you are drinking? 0 Filed at: 02/01/2022 0740   3a  Male UNDER 65: How often do you have five or more drinks on one occasion? 0 Filed at: 02/01/2022 0740   3b  FEMALE Any Age, or MALE 65+: How often do you have 4 or more drinks on one occassion? 0 Filed at: 02/01/2022 0740   Audit-C Score 0 Filed at: 02/01/2022 0740   PENNY: How many times in the past year have you    Used an illegal drug or used a prescription medication for non-medical reasons? Never Filed at: 02/01/2022 0740                    MDM  Number of Diagnoses or Management Options  Abrasion: minor  Hidradenitis suppurativa of left axilla: established and worsening  Hidradenitis: established and worsening     Amount and/or Complexity of Data Reviewed  Clinical lab tests: ordered and reviewed  Tests in the radiology section of CPT®: ordered and reviewed  Tests in the medicine section of CPT®: ordered and reviewed  Review and summarize past medical records: yes    Risk of Complications, Morbidity, and/or Mortality  Presenting problems: low  Diagnostic procedures: low  Management options: low    Patient Progress  Patient progress: stable      Disposition  Final diagnoses:   Hidradenitis suppurativa of left axilla   Abrasion   Hidradenitis     Time reflects when diagnosis was documented in both MDM as applicable and the Disposition within this note     Time User Action Codes Description Comment    2/1/2022  6:58 AM Tequila Shone Add [L73 2] Hidradenitis suppurativa of left axilla     2/1/2022  7:07 AM Tequila Shone Add [T14  8XXA] Abrasion     2/1/2022  7:07 AM Tequila Shone Add [L73 2] Hidradenitis       ED Disposition     ED Disposition Condition Date/Time Comment    Discharge Stable Tue Feb 1, 2022  6:58 AM Neo Forward discharge to home/self care              Follow-up Information     Follow up With Specialties Details Why Contact Info Additional [address] [facility] Emergency Department Emergency Medicine Go to  If symptoms worsen [address] [facility] [id number] [facility] Emergency Department, [address] [facility], [place], [place], [address] General Surgery Call today To schedule follow-up appointment for established diagnosed of hydradenitis suppurativa  [address] [id number]  [facility], [address] [facility], [place], [place], [address]    [address] [facility] Dermatology Call today To schedule a follow-up appointment   [address] [phone]  [address] Dermatology, [place], [place], [address] [facility]          Discharge Medication List as of 2/1/2022  7:08 AM      CONTINUE these medications which have CHANGED    Details   naproxen (Naprosyn) 500 mg tablet Take 1 tablet (500 mg total) by mouth 2 (two) times a day with meals for 5 days, Starting Tue 2/1/2022, Until Sun 2/6/2022, Print         CONTINUE these medications which have NOT CHANGED    Details   clindamycin (CLEOCIN T) 1 % lotion Apply topically daily To axilla and groin until improved, Starting Tue 2/26/2019, Normal      clotrimazole (LOTRIMIN) 1 % cream Apply to affected area 2 times daily for 4 weeks, Print                 PDMP Review       Value Time User    PDMP Reviewed  Yes 2/1/2022  6:58 AM Maia Flores PA-C          ED Provider  Electronically Signed by           Maia Flores PA-C  02/11/22 1678

## 2022-02-14 LAB
BACTERIA TISS AEROBE CULT: ABNORMAL
GRAM STN SPEC: ABNORMAL

## 2022-02-15 ENCOUNTER — TELEPHONE (OUTPATIENT)
Dept: SURGERY | Facility: CLINIC | Age: 27
End: 2022-02-15

## 2022-02-15 LAB — BACTERIA SPEC ANAEROBE CULT: ABNORMAL

## 2022-02-15 NOTE — TELEPHONE ENCOUNTER
Pt called in reference to 2/11 surgery Wounds are still open and raw, water is painful when showering  Wound is not covered  Pt uses disposable underwear  Stings when touched   Pt wants to know what is the healing process and how long will this last pt can be reached at [phone]

## 2022-02-16 NOTE — TELEPHONE ENCOUNTER
It will take a few weeks of healing but she should feel better with time (each day or two that passes there should be improvement ) She is going to be feeling some discomfort when touching the wounds until they heal  It will help to keep them clean, dry and covered other than when she is in the shower

## 2022-02-18 ENCOUNTER — OFFICE VISIT (OUTPATIENT)
Dept: SURGERY | Facility: CLINIC | Age: 27
End: 2022-02-18

## 2022-02-18 VITALS
HEIGHT: 65 IN | SYSTOLIC BLOOD PRESSURE: 124 MMHG | HEART RATE: 77 BPM | DIASTOLIC BLOOD PRESSURE: 80 MMHG | WEIGHT: 208 LBS | BODY MASS INDEX: 34.66 KG/M2

## 2022-02-18 DIAGNOSIS — L73.2 HIDRADENITIS SUPPURATIVA: Primary | ICD-10-CM

## 2022-02-18 DIAGNOSIS — Z09 POSTOP CHECK: ICD-10-CM

## 2022-02-18 PROCEDURE — 99024 POSTOP FOLLOW-UP VISIT: CPT | Performed by: SURGERY

## 2022-02-18 NOTE — PROGRESS NOTES
Assessment/Plan:    Pathology Results:  Final Diagnosis   A  Hidradenitis, excision of hidradenitis- buttock :  -Skin and subcutaneous tissue with acute & chronic inflammation, abscess formation and necrosis , consistent with Hidradenitis suppurativa   -No evidence malignancy         1  Hidradenitis suppurativa    2  Postop check      Healing as expected  F/u in 2-3 weeks for recheck  We will address left axilla when she is ready  Subjective:      Patient ID: Cisco Pitts is a 27 y o  female  Triage Notes:    Maribel Shah is seen for her first followup after excision of hidradentis of bilateral buttocks  She is doing ok - having some discomfort but tolerable  Keeping wounds clean and dry  No fevers or chills  No significant drainage  The following portions of the patient's history were reviewed and updated as appropriate: allergies, current medications, past family history, past medical history, past social history, past surgical history and problem list     Review of Systems      Objective:      /80   Pulse 77   Ht 5' 5" (1 651 m)   Wt 94 3 kg (208 lb)   LMP 01/23/2022   BMI 34 61 kg/m²     Below is the patient's most recent value for Albumin, ALT, AST, BUN, Calcium, Chloride, Cholesterol, CO2, Creatinine, GFR, Glucose, HDL, Hematocrit, Hemoglobin, Hemoglobin A1C, LDL, Magnesium, Phosphorus, Platelets, Potassium, PSA, Sodium, Triglycerides, and WBC  Lab Results   Component Value Date    BUN 10 02/10/2022    CALCIUM 9 4 02/10/2022     02/10/2022    CO2 27 02/10/2022    CREATININE 0 82 02/10/2022    HCT 35 4 02/10/2022    HGB 10 9 (L) 02/10/2022     (H) 02/10/2022    K 4 0 02/10/2022    WBC 6 56 02/10/2022     Note: for a comprehensive list of the patient's lab results, access the Results Review activity  Physical Exam  Vitals and nursing note reviewed  Constitutional:       General: She is not in acute distress  Appearance: She is well-developed   She is not diaphoretic  HENT:      Head: Normocephalic and atraumatic  Mouth/Throat:      Mouth: Mucous membranes are moist    Eyes:      General: No scleral icterus  Pupils: Pupils are equal, round, and reactive to light  Cardiovascular:      Rate and Rhythm: Normal rate and regular rhythm  Pulmonary:      Effort: Pulmonary effort is normal  No respiratory distress  Abdominal:      Palpations: Abdomen is soft  Musculoskeletal:         General: Normal range of motion  Cervical back: Normal range of motion and neck supple  Skin:     General: Skin is warm and dry  Comments: Buttock wounds are unremarkable  No induration, no purulence  No erythema  Cleaned and redressed  Neurological:      Mental Status: She is alert and oriented to person, place, and time  Psychiatric:         Mood and Affect: Mood normal          Behavior: Behavior normal          Thought Content:  Thought content normal          Judgment: Judgment normal              Procedures

## 2022-03-07 ENCOUNTER — OFFICE VISIT (OUTPATIENT)
Dept: SURGERY | Facility: CLINIC | Age: 27
End: 2022-03-07
Payer: COMMERCIAL

## 2022-03-07 VITALS
DIASTOLIC BLOOD PRESSURE: 82 MMHG | HEART RATE: 80 BPM | HEIGHT: 65 IN | WEIGHT: 208 LBS | SYSTOLIC BLOOD PRESSURE: 126 MMHG | BODY MASS INDEX: 34.66 KG/M2

## 2022-03-07 DIAGNOSIS — L73.2 HIDRADENITIS SUPPURATIVA: ICD-10-CM

## 2022-03-07 DIAGNOSIS — L73.2 HIDRADENITIS AXILLARIS: Primary | ICD-10-CM

## 2022-03-07 PROCEDURE — 99214 OFFICE O/P EST MOD 30 MIN: CPT | Performed by: SURGERY

## 2022-03-08 ENCOUNTER — TELEPHONE (OUTPATIENT)
Dept: SURGERY | Facility: CLINIC | Age: 27
End: 2022-03-08

## 2022-03-08 ENCOUNTER — ANNUAL EXAM (OUTPATIENT)
Dept: OBGYN CLINIC | Facility: CLINIC | Age: 27
End: 2022-03-08
Payer: COMMERCIAL

## 2022-03-08 VITALS
SYSTOLIC BLOOD PRESSURE: 124 MMHG | WEIGHT: 206.4 LBS | HEIGHT: 65 IN | BODY MASS INDEX: 34.39 KG/M2 | DIASTOLIC BLOOD PRESSURE: 82 MMHG

## 2022-03-08 DIAGNOSIS — L73.2 HIDRADENITIS: ICD-10-CM

## 2022-03-08 DIAGNOSIS — Z01.419 ENCOUNTER FOR GYNECOLOGICAL EXAMINATION WITHOUT ABNORMAL FINDING: Primary | ICD-10-CM

## 2022-03-08 DIAGNOSIS — Z11.3 SCREENING EXAMINATION FOR STD (SEXUALLY TRANSMITTED DISEASE): ICD-10-CM

## 2022-03-08 PROCEDURE — G0145 SCR C/V CYTO,THINLAYER,RESCR: HCPCS | Performed by: PHYSICIAN ASSISTANT

## 2022-03-08 PROCEDURE — 87591 N.GONORRHOEAE DNA AMP PROB: CPT | Performed by: PHYSICIAN ASSISTANT

## 2022-03-08 PROCEDURE — 87491 CHLMYD TRACH DNA AMP PROBE: CPT | Performed by: PHYSICIAN ASSISTANT

## 2022-03-08 PROCEDURE — 99385 PREV VISIT NEW AGE 18-39: CPT | Performed by: PHYSICIAN ASSISTANT

## 2022-03-08 NOTE — PROGRESS NOTES
Assessment/Plan:    No problem-specific Assessment & Plan notes found for this encounter  Diagnoses and all orders for this visit:    Encounter for gynecological examination without abnormal finding  -     Liquid-based pap, screening    Screening examination for STD (sexually transmitted disease)  -     Chlamydia/GC amplified DNA by PCR    Hidradenitis        Pap and GC/chlamydia screening done  We will call with STD testing results  F/u with surgeon and dermatologist for hidradenitis as planned  If no problems, patient to return in 1 year for routine gyn care  Subjective:      Patient ID: Rashard Bertrand is a 27 y o  female  Patient is here for yearly gyn exam   She is new to our office today  States she is doing well overall  Periods are regular every 28 days, and bleeding lasts for 5 days  She denies heavy bleeding, severe cramping, HA, and mood symptoms  Sexually active with a female partner; requests STD screening  Patient denies bowel/bladder changes, pelvic pain, bloating, abdominal pain, n/v, change in appetite, and thyroid disease  Followed by general surgeon for hidradenitis suppurativa  Has appointment with dermatology in April  Has had several lesion surgically removed; will be having axillary surgery next month  Has a history of abnormal Pap  Patient is performing self-breast exam   Denies new masses, skin changes, nipple discharge, and pain/tenderness  Family cancer history negative  The following portions of the patient's history were reviewed and updated as appropriate: allergies, current medications, past family history, past medical history, past social history, past surgical history and problem list     Review of Systems   Constitutional: Negative for appetite change and unexpected weight change  Cardiovascular:        No masses, skin changes, nipple discharge, and pain/tenderness     Gastrointestinal: Negative for abdominal distention, abdominal pain, constipation, diarrhea, nausea and vomiting  Genitourinary: Negative for difficulty urinating, dysuria, frequency, genital sores, hematuria, menstrual problem, pelvic pain, urgency, vaginal bleeding, vaginal discharge and vaginal pain  Objective:      /82 (BP Location: Left arm, Patient Position: Sitting, Cuff Size: Standard)   Ht 5' 5" (1 651 m)   Wt 93 6 kg (206 lb 6 4 oz)   LMP 02/25/2022   BMI 34 35 kg/m²          Physical Exam  Vitals reviewed  Exam conducted with a chaperone present  Constitutional:       Appearance: Normal appearance  She is well-developed  Neck:      Thyroid: No thyromegaly  Pulmonary:      Effort: Pulmonary effort is normal    Chest:   Breasts: Breasts are symmetrical       Right: Normal  No swelling, bleeding, inverted nipple, mass, nipple discharge, skin change, tenderness, axillary adenopathy or supraclavicular adenopathy  Left: Normal  No swelling, bleeding, inverted nipple, mass, nipple discharge, skin change, tenderness, axillary adenopathy or supraclavicular adenopathy  Comments: Hidradenitis lesions in b/l axilla  Abdominal:      General: Abdomen is flat  There is no distension  Palpations: Abdomen is soft  Tenderness: There is no abdominal tenderness  Genitourinary:     General: Normal vulva  Pubic Area: No rash  Labia:         Right: No rash, tenderness, lesion or injury  Left: No rash, tenderness, lesion or injury  Vagina: Normal  No vaginal discharge, erythema, tenderness or bleeding  Cervix: Normal       Uterus: Normal        Adnexa: Right adnexa normal and left adnexa normal         Right: No mass, tenderness or fullness  Left: No mass, tenderness or fullness  Comments: Multiple hidradenitis lesions in b/l groin  Musculoskeletal:      Cervical back: Neck supple  Lymphadenopathy:      Cervical: No cervical adenopathy        Upper Body:      Right upper body: No supraclavicular or axillary adenopathy  Left upper body: No supraclavicular or axillary adenopathy  Lower Body: No right inguinal adenopathy  No left inguinal adenopathy  Skin:     General: Skin is warm and dry  Neurological:      Mental Status: She is alert and oriented to person, place, and time  Psychiatric:         Mood and Affect: Mood normal          Behavior: Behavior normal  Behavior is cooperative  Thought Content:  Thought content normal          Judgment: Judgment normal

## 2022-03-09 LAB
C TRACH DNA SPEC QL NAA+PROBE: NEGATIVE
N GONORRHOEA DNA SPEC QL NAA+PROBE: NEGATIVE

## 2022-03-09 NOTE — PROGRESS NOTES
Assessment/Plan:    Pathology Results:  A  Hidradenitis, excision of hidradenitis- buttock :  -Skin and subcutaneous tissue with acute & chronic inflammation, abscess formation and necrosis , consistent with Hidradenitis suppurativa   -No evidence malignancy      1  Hidradenitis axillaris  -     Case request operating room: EXCISION HIDRADENITIS AXILLARY; Standing  -     Case request operating room: EXCISION HIDRADENITIS AXILLARY    2  Hidradenitis suppurativa      She feels the discomfort from the buttocks has subsided enough to tolerate excision of the left axilla so we will proceed  I explained there is likely tunneling and the wound will likely need to heal secondarily  Consent signed  Subjective:      Patient ID: Robert Mohan is a 27 y o  female  Triage Notes:    Daphney Forrest is following up for her second visit after excision of hidradenitis of bilateral buttocks  She does note those areas improving with scant drainage, they are not yet closed  She does also note increased drainage and discomfort in the left axilla  The following portions of the patient's history were reviewed and updated as appropriate: allergies, current medications, past family history, past medical history, past social history, past surgical history and problem list     Review of Systems   Constitutional: Negative for activity change and appetite change  HENT: Negative for congestion, hearing loss, sore throat and trouble swallowing  Eyes: Negative for discharge and visual disturbance  Respiratory: Negative for cough, chest tightness, shortness of breath and wheezing  Cardiovascular: Negative for chest pain and palpitations  Gastrointestinal: Negative for abdominal pain  Endocrine: Negative for cold intolerance and heat intolerance  Genitourinary: Negative for difficulty urinating  Musculoskeletal: Negative for gait problem  Skin: Positive for wound  Negative for color change and rash     Neurological: Negative for dizziness, speech difficulty and headaches  Psychiatric/Behavioral: Negative for behavioral problems and confusion  The patient is not nervous/anxious  Objective:      /82   Pulse 80   Ht 5' 5" (1 651 m)   Wt 94 3 kg (208 lb)   LMP 02/25/2022   BMI 34 61 kg/m²     Below is the patient's most recent value for Albumin, ALT, AST, BUN, Calcium, Chloride, Cholesterol, CO2, Creatinine, GFR, Glucose, HDL, Hematocrit, Hemoglobin, Hemoglobin A1C, LDL, Magnesium, Phosphorus, Platelets, Potassium, PSA, Sodium, Triglycerides, and WBC  Lab Results   Component Value Date    BUN 10 02/10/2022    CALCIUM 9 4 02/10/2022     02/10/2022    CO2 27 02/10/2022    CREATININE 0 82 02/10/2022    HCT 35 4 02/10/2022    HGB 10 9 (L) 02/10/2022     (H) 02/10/2022    K 4 0 02/10/2022    WBC 6 56 02/10/2022     Note: for a comprehensive list of the patient's lab results, access the Results Review activity  Physical Exam  Vitals and nursing note reviewed  Constitutional:       General: She is not in acute distress  Appearance: She is well-developed  She is not diaphoretic  HENT:      Head: Normocephalic and atraumatic  Mouth/Throat:      Mouth: Mucous membranes are moist    Eyes:      General: No scleral icterus  Pupils: Pupils are equal, round, and reactive to light  Cardiovascular:      Rate and Rhythm: Normal rate and regular rhythm  Pulmonary:      Effort: Pulmonary effort is normal  No respiratory distress  Abdominal:      Palpations: Abdomen is soft  Musculoskeletal:         General: Normal range of motion  Cervical back: Normal range of motion and neck supple  Skin:     General: Skin is warm and dry  Comments: Buttock wounds are unremarkable  No induration, no purulence  No erythema  Cleaned, treated with silver nitrate and covered with guaze       In the left axilla there are three punctate draining sinuses with likely tunneling between them and induration medially  Neurological:      Mental Status: She is alert and oriented to person, place, and time  Psychiatric:         Mood and Affect: Mood normal          Behavior: Behavior normal          Thought Content:  Thought content normal          Judgment: Judgment normal              Procedures

## 2022-03-09 NOTE — H&P (VIEW-ONLY)
Assessment/Plan:    Pathology Results:  A  Hidradenitis, excision of hidradenitis- buttock :  -Skin and subcutaneous tissue with acute & chronic inflammation, abscess formation and necrosis , consistent with Hidradenitis suppurativa   -No evidence malignancy      1  Hidradenitis axillaris  -     Case request operating room: EXCISION HIDRADENITIS AXILLARY; Standing  -     Case request operating room: EXCISION HIDRADENITIS AXILLARY    2  Hidradenitis suppurativa      She feels the discomfort from the buttocks has subsided enough to tolerate excision of the left axilla so we will proceed  I explained there is likely tunneling and the wound will likely need to heal secondarily  Consent signed  Subjective:      Patient ID: Saba Fraire is a 27 y o  female  Triage Notes:    Azra Atkinson is following up for her second visit after excision of hidradenitis of bilateral buttocks  She does note those areas improving with scant drainage, they are not yet closed  She does also note increased drainage and discomfort in the left axilla  The following portions of the patient's history were reviewed and updated as appropriate: allergies, current medications, past family history, past medical history, past social history, past surgical history and problem list     Review of Systems   Constitutional: Negative for activity change and appetite change  HENT: Negative for congestion, hearing loss, sore throat and trouble swallowing  Eyes: Negative for discharge and visual disturbance  Respiratory: Negative for cough, chest tightness, shortness of breath and wheezing  Cardiovascular: Negative for chest pain and palpitations  Gastrointestinal: Negative for abdominal pain  Endocrine: Negative for cold intolerance and heat intolerance  Genitourinary: Negative for difficulty urinating  Musculoskeletal: Negative for gait problem  Skin: Positive for wound  Negative for color change and rash     Neurological: Negative for dizziness, speech difficulty and headaches  Psychiatric/Behavioral: Negative for behavioral problems and confusion  The patient is not nervous/anxious  Objective:      /82   Pulse 80   Ht 5' 5" (1 651 m)   Wt 94 3 kg (208 lb)   LMP 02/25/2022   BMI 34 61 kg/m²     Below is the patient's most recent value for Albumin, ALT, AST, BUN, Calcium, Chloride, Cholesterol, CO2, Creatinine, GFR, Glucose, HDL, Hematocrit, Hemoglobin, Hemoglobin A1C, LDL, Magnesium, Phosphorus, Platelets, Potassium, PSA, Sodium, Triglycerides, and WBC  Lab Results   Component Value Date    BUN 10 02/10/2022    CALCIUM 9 4 02/10/2022     02/10/2022    CO2 27 02/10/2022    CREATININE 0 82 02/10/2022    HCT 35 4 02/10/2022    HGB 10 9 (L) 02/10/2022     (H) 02/10/2022    K 4 0 02/10/2022    WBC 6 56 02/10/2022     Note: for a comprehensive list of the patient's lab results, access the Results Review activity  Physical Exam  Vitals and nursing note reviewed  Constitutional:       General: She is not in acute distress  Appearance: She is well-developed  She is not diaphoretic  HENT:      Head: Normocephalic and atraumatic  Mouth/Throat:      Mouth: Mucous membranes are moist    Eyes:      General: No scleral icterus  Pupils: Pupils are equal, round, and reactive to light  Cardiovascular:      Rate and Rhythm: Normal rate and regular rhythm  Pulmonary:      Effort: Pulmonary effort is normal  No respiratory distress  Abdominal:      Palpations: Abdomen is soft  Musculoskeletal:         General: Normal range of motion  Cervical back: Normal range of motion and neck supple  Skin:     General: Skin is warm and dry  Comments: Buttock wounds are unremarkable  No induration, no purulence  No erythema  Cleaned, treated with silver nitrate and covered with guaze       In the left axilla there are three punctate draining sinuses with likely tunneling between them and induration medially  Neurological:      Mental Status: She is alert and oriented to person, place, and time  Psychiatric:         Mood and Affect: Mood normal          Behavior: Behavior normal          Thought Content:  Thought content normal          Judgment: Judgment normal              Procedures

## 2022-03-11 NOTE — PRE-PROCEDURE INSTRUCTIONS
Pre-Surgery Instructions:   Medication Instructions    acetaminophen (TYLENOL) 500 mg tablet may use prn & dos    DANDELION ROOT PO ld 2/9    Omega-3 Fatty Acids (FISH OIL PO) ld 2/9    RESVERATROL PO ld 2/9    Turmeric 500 MG CAPS ld 2/9    VITAMIN A PO ld 2/9    INSTR  ON hospital LOC  ,BRING PHOTO ID/MED LIST/INS  INFO , SHOWER REV , NO ASA/NSAIDS/VIT 1 WEEK PREOP  Pre Procedure Consult Calls    1  Are you currently experiencing symptoms of fever >100 4, cough, or shortness of breath or sore throat? ______YES    ___X__NO   If yes, then please call your PCP immediately for further direction  If you are completing this form on site, please find the safest and most direct route to the nearest exit and avoid close contact with others until you can get further advice from your PCP  2  Have you recently traveled to any foreign country or area within the [place] that has reported cases of COVID-19? ______YES      __X___NO   IF YES, LIST LOCATION(S): __________________________   3  Have you recently been in contact with someone who is a suspected or confirmed case of COVID-19?   ______ YES   __X____ No   INSTRUCTED ON NEW COVID VISITOR POLICY- ONLY 1 VISITOR, ALL MUST WEAR MASKS, PT VERBALIZES UNDERSTANDING

## 2022-03-14 LAB
LAB AP GYN PRIMARY INTERPRETATION: NORMAL
Lab: NORMAL

## 2022-03-15 ENCOUNTER — ANESTHESIA (OUTPATIENT)
Dept: PERIOP | Facility: HOSPITAL | Age: 27
End: 2022-03-15
Payer: COMMERCIAL

## 2022-03-15 ENCOUNTER — HOSPITAL ENCOUNTER (OUTPATIENT)
Facility: HOSPITAL | Age: 27
Setting detail: OUTPATIENT SURGERY
Discharge: HOME | End: 2022-03-15
Attending: SURGERY | Admitting: SURGERY
Payer: COMMERCIAL

## 2022-03-15 ENCOUNTER — ANESTHESIA EVENT (OUTPATIENT)
Dept: PERIOP | Facility: HOSPITAL | Age: 27
End: 2022-03-15
Payer: COMMERCIAL

## 2022-03-15 VITALS
HEIGHT: 65 IN | BODY MASS INDEX: 34.16 KG/M2 | HEART RATE: 59 BPM | TEMPERATURE: 97.9 F | SYSTOLIC BLOOD PRESSURE: 119 MMHG | WEIGHT: 205.03 LBS | DIASTOLIC BLOOD PRESSURE: 69 MMHG | RESPIRATION RATE: 20 BRPM | OXYGEN SATURATION: 99 %

## 2022-03-15 DIAGNOSIS — L73.2 HIDRADENITIS AXILLARIS: ICD-10-CM

## 2022-03-15 LAB
EXT PREGNANCY TEST URINE: NEGATIVE
EXT. CONTROL: NORMAL

## 2022-03-15 PROCEDURE — 88304 TISSUE EXAM BY PATHOLOGIST: CPT | Performed by: PATHOLOGY

## 2022-03-15 PROCEDURE — 81025 URINE PREGNANCY TEST: CPT | Performed by: SURGERY

## 2022-03-15 PROCEDURE — 11450 EXC SKN HDRDNT AX SMPL/NTRM: CPT | Performed by: SURGERY

## 2022-03-15 RX ORDER — SODIUM CHLORIDE, SODIUM LACTATE, POTASSIUM CHLORIDE, CALCIUM CHLORIDE 600; 310; 30; 20 MG/100ML; MG/100ML; MG/100ML; MG/100ML
125 INJECTION, SOLUTION INTRAVENOUS CONTINUOUS
Status: DISCONTINUED | OUTPATIENT
Start: 2022-03-15 | End: 2022-03-15 | Stop reason: HOSPADM

## 2022-03-15 RX ORDER — DIPHENHYDRAMINE HYDROCHLORIDE 50 MG/ML
12.5 INJECTION INTRAMUSCULAR; INTRAVENOUS ONCE AS NEEDED
Status: DISCONTINUED | OUTPATIENT
Start: 2022-03-15 | End: 2022-03-15 | Stop reason: HOSPADM

## 2022-03-15 RX ORDER — ONDANSETRON 2 MG/ML
INJECTION INTRAMUSCULAR; INTRAVENOUS AS NEEDED
Status: DISCONTINUED | OUTPATIENT
Start: 2022-03-15 | End: 2022-03-15

## 2022-03-15 RX ORDER — PROMETHAZINE HYDROCHLORIDE 25 MG/ML
12.5 INJECTION, SOLUTION INTRAMUSCULAR; INTRAVENOUS ONCE AS NEEDED
Status: DISCONTINUED | OUTPATIENT
Start: 2022-03-15 | End: 2022-03-15 | Stop reason: HOSPADM

## 2022-03-15 RX ORDER — FENTANYL CITRATE/PF 50 MCG/ML
25 SYRINGE (ML) INJECTION
Status: DISCONTINUED | OUTPATIENT
Start: 2022-03-15 | End: 2022-03-15 | Stop reason: HOSPADM

## 2022-03-15 RX ORDER — DEXAMETHASONE SODIUM PHOSPHATE 10 MG/ML
INJECTION, SOLUTION INTRAMUSCULAR; INTRAVENOUS AS NEEDED
Status: DISCONTINUED | OUTPATIENT
Start: 2022-03-15 | End: 2022-03-15

## 2022-03-15 RX ORDER — CLINDAMYCIN PHOSPHATE 900 MG/50ML
900 INJECTION INTRAVENOUS ONCE
Status: COMPLETED | OUTPATIENT
Start: 2022-03-15 | End: 2022-03-15

## 2022-03-15 RX ORDER — HYDROCODONE BITARTRATE AND ACETAMINOPHEN 5; 325 MG/1; MG/1
1 TABLET ORAL EVERY 6 HOURS PRN
Qty: 15 TABLET | Refills: 0 | Status: SHIPPED | OUTPATIENT
Start: 2022-03-15 | End: 2022-03-25

## 2022-03-15 RX ORDER — PROPOFOL 10 MG/ML
INJECTION, EMULSION INTRAVENOUS AS NEEDED
Status: DISCONTINUED | OUTPATIENT
Start: 2022-03-15 | End: 2022-03-15

## 2022-03-15 RX ORDER — KETOROLAC TROMETHAMINE 30 MG/ML
INJECTION, SOLUTION INTRAMUSCULAR; INTRAVENOUS AS NEEDED
Status: DISCONTINUED | OUTPATIENT
Start: 2022-03-15 | End: 2022-03-15

## 2022-03-15 RX ORDER — CLINDAMYCIN PHOSPHATE 900 MG/50ML
900 INJECTION INTRAVENOUS ONCE
Status: DISCONTINUED | OUTPATIENT
Start: 2022-03-15 | End: 2022-03-15 | Stop reason: HOSPADM

## 2022-03-15 RX ORDER — FENTANYL CITRATE 50 UG/ML
INJECTION, SOLUTION INTRAMUSCULAR; INTRAVENOUS AS NEEDED
Status: DISCONTINUED | OUTPATIENT
Start: 2022-03-15 | End: 2022-03-15

## 2022-03-15 RX ORDER — ONDANSETRON 2 MG/ML
4 INJECTION INTRAMUSCULAR; INTRAVENOUS ONCE AS NEEDED
Status: DISCONTINUED | OUTPATIENT
Start: 2022-03-15 | End: 2022-03-15 | Stop reason: HOSPADM

## 2022-03-15 RX ORDER — HYDROMORPHONE HCL/PF 1 MG/ML
0.5 SYRINGE (ML) INJECTION
Status: DISCONTINUED | OUTPATIENT
Start: 2022-03-15 | End: 2022-03-15 | Stop reason: HOSPADM

## 2022-03-15 RX ORDER — LIDOCAINE HYDROCHLORIDE 10 MG/ML
INJECTION, SOLUTION EPIDURAL; INFILTRATION; INTRACAUDAL; PERINEURAL AS NEEDED
Status: DISCONTINUED | OUTPATIENT
Start: 2022-03-15 | End: 2022-03-15

## 2022-03-15 RX ORDER — MIDAZOLAM HYDROCHLORIDE 2 MG/2ML
INJECTION, SOLUTION INTRAMUSCULAR; INTRAVENOUS AS NEEDED
Status: DISCONTINUED | OUTPATIENT
Start: 2022-03-15 | End: 2022-03-15

## 2022-03-15 RX ADMIN — MIDAZOLAM HYDROCHLORIDE 2 MG: 1 INJECTION, SOLUTION INTRAMUSCULAR; INTRAVENOUS at 09:56

## 2022-03-15 RX ADMIN — FENTANYL CITRATE 50 MCG: 50 INJECTION, SOLUTION INTRAMUSCULAR; INTRAVENOUS at 10:00

## 2022-03-15 RX ADMIN — LIDOCAINE HYDROCHLORIDE 50 MG: 10 INJECTION, SOLUTION EPIDURAL; INFILTRATION; INTRACAUDAL; PERINEURAL at 10:00

## 2022-03-15 RX ADMIN — SODIUM CHLORIDE, SODIUM LACTATE, POTASSIUM CHLORIDE, AND CALCIUM CHLORIDE 125 ML/HR: .6; .31; .03; .02 INJECTION, SOLUTION INTRAVENOUS at 09:46

## 2022-03-15 RX ADMIN — FENTANYL CITRATE 50 MCG: 50 INJECTION, SOLUTION INTRAMUSCULAR; INTRAVENOUS at 10:17

## 2022-03-15 RX ADMIN — ONDANSETRON 4 MG: 2 INJECTION INTRAMUSCULAR; INTRAVENOUS at 10:54

## 2022-03-15 RX ADMIN — KETOROLAC TROMETHAMINE 30 MG: 30 INJECTION, SOLUTION INTRAMUSCULAR at 11:06

## 2022-03-15 RX ADMIN — FENTANYL CITRATE 25 MCG: 50 INJECTION, SOLUTION INTRAMUSCULAR; INTRAVENOUS at 10:40

## 2022-03-15 RX ADMIN — DEXAMETHASONE SODIUM PHOSPHATE 8 MG: 10 INJECTION, SOLUTION INTRAMUSCULAR; INTRAVENOUS at 10:03

## 2022-03-15 RX ADMIN — PROPOFOL 200 MG: 10 INJECTION, EMULSION INTRAVENOUS at 10:02

## 2022-03-15 RX ADMIN — CLINDAMYCIN PHOSPHATE 900 MG: 900 INJECTION, SOLUTION INTRAVENOUS at 09:49

## 2022-03-15 RX ADMIN — FENTANYL CITRATE 25 MCG: 50 INJECTION, SOLUTION INTRAMUSCULAR; INTRAVENOUS at 10:35

## 2022-03-15 NOTE — LETTER
[address]  [place] [phone]  Dept: [phone]    March 15, 2022     Patient: Indu Salazar   YOB: 1995   Date of Visit: 3/9/2022       To Whom it May Concern:    Indu Salazar is under my professional care  She was seen in the hospital on 03/15/22  She may return to work on 3/17/22 with the following limitations light duty  If you have any questions or concerns, please don't hesitate to call           Sincerely,          Rebeca Mckinney MD

## 2022-03-15 NOTE — INTERVAL H&P NOTE
H&P reviewed  After examining the patient I find no changes in the patients condition since the H&P had been written  Left axilla marked       Vitals:    03/15/22 0903   BP: 127/72   Pulse: 77   Resp: 22   Temp: 97 8 °F (36 6 °C)   SpO2: 100%

## 2022-03-15 NOTE — ANESTHESIA POSTPROCEDURE EVALUATION
Post-Op Assessment Note    CV Status:  Stable  Pain Score: 0    Pain management: adequate     Mental Status:  Alert and awake   Hydration Status:  Euvolemic   PONV Controlled:  Controlled   Airway Patency:  Patent      Post Op Vitals Reviewed: Yes      Staff: CRNA         No complications documented      BP   130/76   Temp 97 5   Pulse 88   Resp 14   SpO2 100
Danger to others/Danger to self

## 2022-03-15 NOTE — DISCHARGE INSTRUCTIONS
General Mass Excision   WHAT YOU NEED TO KNOW:   Excision is surgery to remove a mass, such as a tumor  Excision may be done for a diagnosis or for treatment  Removal may be the only treatment needed, or may be part of your treatment plan  DISCHARGE INSTRUCTIONS:   Call your local emergency number (911 in the [facility]) if:   · You have chest pain or trouble breathing  · You cough up blood  Seek care immediately if:   · You are bleeding more than you were told to expect, even when you apply pressure  · Your leg feels warm, tender, and painful  It may look swollen and red  · You see signs of infection in the surgery area, such as redness, swelling, or pus  · Your pain is not getting better or is getting worse, even with pain medicine  · You suddenly have severe pain in the surgery area  · Your stitches come apart  Call your doctor or surgeon if:   · You have a fever  · You have nausea or are vomiting  · You have questions or concerns about your condition or care  Medicines: You may need any of the following:  · Antibiotics  help prevent or fight a bacterial infection  · Acetaminophen  decreases pain and fever  It is available without a doctor's order  Ask how much to take and how often to take it  Follow directions  Read the labels of all other medicines you are using to see if they also contain acetaminophen, or ask your doctor or pharmacist  Acetaminophen can cause liver damage if not taken correctly  Do not use more than 4 grams (4,000 milligrams) total of acetaminophen in one day  · Prescription pain medicine  may be given  Ask your healthcare provider how to take this medicine safely  Some prescription pain medicines contain acetaminophen  Do not take other medicines that contain acetaminophen without talking to your healthcare provider  Too much acetaminophen may cause liver damage  Prescription pain medicine may cause constipation   Ask your healthcare provider how to prevent or treat constipation  · Take your medicine as directed  Contact your healthcare provider if you think your medicine is not helping or if you have side effects  Tell him or her if you are allergic to any medicine  Keep a list of the medicines, vitamins, and herbs you take  Include the amounts, and when and why you take them  Bring the list or the pill bottles to follow-up visits  Carry your medicine list with you in case of an emergency  Self-care:   · Rest as needed  You may feel like resting more than usual for a few days after surgery  You will also need to walk around to prevent blood clots  Your healthcare provider will tell you how much activity is okay while you heal     · Ask when you can return to your usual daily activities  Your provider will tell you when you can drive, return to work or school, or do other activities  Do not play sports, exercise, or lift anything heavy until your provider says it is okay  · Keep the surgery area clean and dry  Do not go swimming or soak in a hot tub or bath  Your provider will give you instructions for bathing  He or she may tell you it is okay to take a shower the day after surgery  You may need to keep the surgery area covered while you bathe  · Care for the surgery area as directed  If the incision was closed with surgical tape, the strips will fall off on their own within 10 days  Do not pull them off  You may need to wash the surgery area with soap and water  Your provider will tell you if you should do this, and when  Dry the area and put on new, clean bandages as directed  Change your bandages when they get wet or dirty  Follow up with your doctor or surgeon as directed:  Write down your questions so you remember to ask them during your visits  © Copyright [employer] 2022 Information is for End User's use only and may not be sold, redistributed or otherwise used for commercial purposes   All illustrations and images included in CareNotes® are the copyrighted property of [employer]  or Nahed Upton   The above information is an  only  It is not intended as medical advice for individual conditions or treatments  Talk to your doctor, nurse or pharmacist before following any medical regimen to see if it is safe and effective for you

## 2022-03-15 NOTE — OP NOTE
OPERATIVE REPORT  PATIENT NAME: Araceli Mazariegos    :  1995  MRN: [id number]  Pt Location: [facility] OR ROOM 03    SURGERY DATE: 3/15/2022    Surgeon(s) and Role:     * Bryan Barbosa MD - Primary    Preop Diagnosis:  Hidradenitis axillaris [L73 2]    Post-Op Diagnosis Codes:     * Hidradenitis axillaris [L73 2]    Procedure(s) (LRB):  EXCISION HIDRADENITIS AXILLARY (Left)    Specimen(s):  ID Type Source Tests Collected by Time Destination   1 : LEFT AXILLA HIDRADENITIS Tissue Soft Tissue, Other TISSUE EXAM Bryan Barbosa MD 3/15/2022 1051        Estimated Blood Loss:   40ml    Drains:  * No LDAs found *    Anesthesia Type:   General/LMA    Operative Indications:  Hidradenitis axillaris [L73 2]    Operative Findings:  Hidradenitis with multiple connecting sinuses and chronic abscess cavity of the left axilla   Excision of one area 1 5 x 1 5 x 0 5cm  Larger area 9 5 x 4 x 0 5 cm     Complications:   None    Procedure and Technique:  The patient was seen in preop holding where the location of the lesion was confirmed and marked  The H&P was updated and the procedure reviewed with the patient  The patient was then brought to the OR and placed in supine position with the left arm extended overhead  The patient was sedated and LMA was placed  The site was prepped and draped in sterile fashion  A preincision time out was initiated by myself and confirmed the patient, procedure, site/side and any applicable marking as well as preoperative antibiotics  The skin and subcutaneous tissue was then anesthetized with 1% lidocaine and 0 25% marcaine mixed 1:1  The inflamed thickened and tracking lesions were excised using the bovey electrocautery to excise the epidermis, dermis and deep to this was consistent with a chronic abscess cavity  The cavity was also debrided using guaze and currette  There was also a smaller more medial area that was excised and similarly debrided   The wounds final measurements were debrided to 9 5 x 4 x 0 5cm deep and the smaller to 1 5 x 1 5 x 0 5 cm  This inflamed tissue oozing was controlled using the bovey and surgicele packing  4 x 4s and abd was used to cover the wound        I was present for the entire procedure, A qualified resident physician was not available    Patient Disposition:  PACU  and extubated and stable      SIGNATURE: Diogo Zuniga MD  DATE: March 15, 2022  TIME: 11:04 AM

## 2022-03-15 NOTE — ANESTHESIA PREPROCEDURE EVALUATION
Procedure:  EXCISION HIDRADENITIS AXILLARY (Left Axilla)    Relevant Problems   Other   (+) Hidradenitis      Anemia, Hgb 10 9  Obesity    Physical Exam    Airway    Mallampati score: II  TM Distance: >3 FB  Neck ROM: full     Dental   Comment: Denies loose teeth,     Cardiovascular  Cardiovascular exam normal    Pulmonary  Pulmonary exam normal     Other Findings  Portions of exam deferred due to low yield and/or unknown COVID status      Anesthesia Plan  ASA Score- 2     Anesthesia Type- general with ASA Monitors  Additional Monitors:   Airway Plan: LMA  Plan Factors-Exercise tolerance (METS): >4 METS  Chart reviewed  Existing labs reviewed  Patient summary reviewed  Patient is not a current smoker  Induction- intravenous  Postoperative Plan-     Informed Consent- Anesthetic plan and risks discussed with patient  I personally reviewed this patient with the CRNA  Discussed and agreed on the Anesthesia Plan with the CRNA  Poli Wiggins

## 2022-03-30 ENCOUNTER — OFFICE VISIT (OUTPATIENT)
Dept: SURGERY | Facility: CLINIC | Age: 27
End: 2022-03-30

## 2022-03-30 VITALS
RESPIRATION RATE: 16 BRPM | OXYGEN SATURATION: 98 % | HEART RATE: 88 BPM | BODY MASS INDEX: 34.66 KG/M2 | HEIGHT: 65 IN | SYSTOLIC BLOOD PRESSURE: 120 MMHG | DIASTOLIC BLOOD PRESSURE: 78 MMHG | TEMPERATURE: 97.9 F | WEIGHT: 208 LBS

## 2022-03-30 DIAGNOSIS — L73.2 HIDRADENITIS SUPPURATIVA: Primary | ICD-10-CM

## 2022-03-30 PROCEDURE — 99024 POSTOP FOLLOW-UP VISIT: CPT | Performed by: SURGERY

## 2022-03-30 RX ORDER — CLINDAMYCIN PHOSPHATE 10 UG/ML
LOTION TOPICAL 2 TIMES DAILY
Qty: 60 ML | Refills: 0 | Status: SHIPPED | OUTPATIENT
Start: 2022-03-30 | End: 2022-08-02 | Stop reason: SDUPTHER

## 2022-04-10 NOTE — PROGRESS NOTES
Assessment/Plan:    Pathology Results:     1  Hidradenitis suppurativa  -     clindamycin (CLEOCIN T) 1 % lotion; Apply topically 2 (two) times a day      apply topical cleocin bid to the affected area and return in 2 weeks for repeat exam           Subjective:      Patient ID: Joni Gómez is a 27 y o  female  Triage Notes:    Montez Melchor is following up for her third visit after excision of hidradenitis of bilateral buttocks and her first visit since excision of the left axilla  The axilla is doing well but she has some new swelling/discomfort of the buttock area  The following portions of the patient's history were reviewed and updated as appropriate: allergies, current medications, past family history, past medical history, past social history, past surgical history and problem list     Review of Systems      Objective:      /78   Pulse 88   Temp 97 9 °F (36 6 °C) (Oral)   Resp 16   Ht 5' 5" (1 651 m)   Wt 94 3 kg (208 lb)   SpO2 98%   BMI 34 61 kg/m²     Below is the patient's most recent value for Albumin, ALT, AST, BUN, Calcium, Chloride, Cholesterol, CO2, Creatinine, GFR, Glucose, HDL, Hematocrit, Hemoglobin, Hemoglobin A1C, LDL, Magnesium, Phosphorus, Platelets, Potassium, PSA, Sodium, Triglycerides, and WBC  Lab Results   Component Value Date    BUN 10 02/10/2022    CALCIUM 9 4 02/10/2022     02/10/2022    CO2 27 02/10/2022    CREATININE 0 82 02/10/2022    HCT 35 4 02/10/2022    HGB 10 9 (L) 02/10/2022     (H) 02/10/2022    K 4 0 02/10/2022    WBC 6 56 02/10/2022     Note: for a comprehensive list of the patient's lab results, access the Results Review activity  Physical Exam  Vitals and nursing note reviewed  Constitutional:       General: She is not in acute distress  Appearance: She is well-developed  She is not diaphoretic  HENT:      Head: Normocephalic and atraumatic        Mouth/Throat:      Mouth: Mucous membranes are moist    Eyes:      General: No scleral icterus  Pupils: Pupils are equal, round, and reactive to light  Cardiovascular:      Rate and Rhythm: Normal rate and regular rhythm  Pulmonary:      Effort: Pulmonary effort is normal  No respiratory distress  Abdominal:      Palpations: Abdomen is soft  Musculoskeletal:         General: Normal range of motion  Cervical back: Normal range of motion and neck supple  Skin:     General: Skin is warm and dry  Comments: Buttock wounds are unremarkable but there is a new raised area lateral to an area that was debrided, it was probed to more completely drain it and covered  Cleaned, treated with silver nitrate and covered with guaze  In the left axilla the wound is granulating but there are a few areas of hypergranulation that are treated with silver nitrate  Neurological:      Mental Status: She is alert and oriented to person, place, and time  Psychiatric:         Mood and Affect: Mood normal          Behavior: Behavior normal          Thought Content:  Thought content normal          Judgment: Judgment normal              Procedures

## 2022-04-22 ENCOUNTER — OFFICE VISIT (OUTPATIENT)
Dept: SURGERY | Facility: CLINIC | Age: 27
End: 2022-04-22
Payer: COMMERCIAL

## 2022-04-22 VITALS
DIASTOLIC BLOOD PRESSURE: 84 MMHG | BODY MASS INDEX: 34.66 KG/M2 | HEIGHT: 65 IN | WEIGHT: 208 LBS | SYSTOLIC BLOOD PRESSURE: 118 MMHG

## 2022-04-22 DIAGNOSIS — L73.2 HIDRADENITIS AXILLARIS: Primary | ICD-10-CM

## 2022-04-22 PROCEDURE — 99214 OFFICE O/P EST MOD 30 MIN: CPT | Performed by: SURGERY

## 2022-04-22 RX ORDER — CLINDAMYCIN HYDROCHLORIDE 300 MG/1
300 CAPSULE ORAL 2 TIMES DAILY
Qty: 20 CAPSULE | Refills: 0 | Status: SHIPPED | OUTPATIENT
Start: 2022-04-22 | End: 2022-05-02

## 2022-04-26 ENCOUNTER — CONSULT (OUTPATIENT)
Dept: DERMATOLOGY | Facility: CLINIC | Age: 27
End: 2022-04-26
Payer: COMMERCIAL

## 2022-04-26 VITALS — WEIGHT: 207 LBS | BODY MASS INDEX: 34.49 KG/M2 | TEMPERATURE: 97.8 F | HEIGHT: 65 IN

## 2022-04-26 DIAGNOSIS — D22.60 MULTIPLE BENIGN MELANOCYTIC NEVI OF UPPER AND LOWER EXTREMITIES AND TRUNK: ICD-10-CM

## 2022-04-26 DIAGNOSIS — D22.70 MULTIPLE BENIGN MELANOCYTIC NEVI OF UPPER AND LOWER EXTREMITIES AND TRUNK: ICD-10-CM

## 2022-04-26 DIAGNOSIS — L73.2 HIDRADENITIS SUPPURATIVA OF ANUS: ICD-10-CM

## 2022-04-26 DIAGNOSIS — D22.5 MULTIPLE BENIGN MELANOCYTIC NEVI OF UPPER AND LOWER EXTREMITIES AND TRUNK: ICD-10-CM

## 2022-04-26 DIAGNOSIS — L73.2 HIDRADENITIS SUPPURATIVA: Primary | ICD-10-CM

## 2022-04-26 PROCEDURE — 99242 OFF/OP CONSLTJ NEW/EST SF 20: CPT | Performed by: DERMATOLOGY

## 2022-04-26 RX ORDER — SPIRONOLACTONE 50 MG/1
TABLET, FILM COATED ORAL
Qty: 90 TABLET | Refills: 1 | Status: SHIPPED | OUTPATIENT
Start: 2022-04-26 | End: 2022-08-02

## 2022-04-26 NOTE — PROGRESS NOTES
[facility] Follow Up Note    Patient Name: Matilda Summers  Encounter Date: 04/26/22    Today's Chief Concerns:  Radha Álvarez Concern #1:  Fu to Hidradenitis       Current Medications:    Current Outpatient Medications:     acetaminophen (TYLENOL) 500 mg tablet, Take 500-1,000 mg by mouth every 6 (six) hours as needed for mild pain, Disp: , Rfl:     clindamycin (CLEOCIN T) 1 % lotion, Apply topically 2 (two) times a day, Disp: 60 mL, Rfl: 0    clindamycin (CLEOCIN) 300 MG capsule, Take 1 capsule (300 mg total) by mouth 2 (two) times a day for 10 days, Disp: 20 capsule, Rfl: 0    DANDELION ROOT PO, Take by mouth in the morning (Patient not taking: Reported on 4/22/2022 ), Disp: , Rfl:     docusate sodium (COLACE) 100 mg capsule, Take 1 capsule (100 mg total) by mouth 2 (two) times a day as needed for constipation (Patient not taking: Reported on 2/18/2022 ), Disp: 10 capsule, Rfl: 0    Omega-3 Fatty Acids (FISH OIL PO), Take by mouth in the morning (Patient not taking: Reported on 4/22/2022 ), Disp: , Rfl:     RESVERATROL PO, Take by mouth in the morning (Patient not taking: Reported on 4/22/2022 ), Disp: , Rfl:     Turmeric 500 MG CAPS, Take by mouth in the morning (Patient not taking: Reported on 4/22/2022 ), Disp: , Rfl:     VITAMIN A PO, Take by mouth every other day (Patient not taking: Reported on 4/22/2022 ), Disp: , Rfl:     CONSTITUTIONAL:   Vitals:    04/26/22 1449   Weight: 93 9 kg (207 lb)   Height: 5' 5" (1 651 m)     Specific Alerts:    Have you been seen by a [facility] in the last 3 years? YES    Are you pregnant or planning to become pregnant? No    Are you currently or planning to be nursing or breast feeding? No    Allergies   Allergen Reactions    Oxycodone-Acetaminophen Drowsiness    Percolone [Oxycodone] GI Intolerance       May we call your Preferred Phone number to discuss your specific medical information?  YES    May we leave a detailed message that includes your specific medical information? YES    Have you traveled outside of the [facility] in the past 3 months? No    Do you currently have a pacemaker or defibrillator? No    Do you have any artificial heart valves, joints, plates, screws, rods, stents, pins, etc? No   - If Yes, were any placed within the last 2 years? Do you require any medications prior to a surgical procedure? No      Are you taking any medications that cause you to bleed more easily ("blood thinners") No    Have you ever experienced a rapid heartbeat with epinephrine? No    Have you ever been treated with "gold" (gold sodium thiomalate) therapy? No    [facility] can help with wrinkles, "laugh lines," facial volume loss, "double chin," "love handles," age spots, and more  Are you interested in learning today about some of the skin enhancement procedures that we offer? (If Yes, please provide more detail) No    Review of Systems:  Have you recently had or currently have any of the following?     · Fever or chills: No  · Night Sweats: No  · Headaches: No  · Weight Gain: No  · Weight Loss: No  · Blurry Vision: No  · Nausea: No  · Vomiting: No  · Diarrhea: No  · Blood in Stool: No  · Abdominal Pain: No  · Itchy Skin: No  · Painful Joints: No  · Swollen Joints: No  · Muscle Pain: No  · Irregular Mole: YES  · Sun Burn: No  · Dry Skin: No  · Skin Color Changes: No  · Scar or Keloid: YES  · Cold Sores/Fever Blisters: No  · Bacterial Infections/MRSA: No  · Anxiety: No  · Depression: No  · Suicidal or Homicidal Thoughts: No      PSYCH: Normal mood and affect  EYES: Normal conjunctiva  ENT: Normal lips and oral mucosa  CARDIOVASCULAR: No edema  RESPIRATORY: Normal respirations  HEME/LYMPH/IMMUNO:  No regional lymphadenopathy except as noted below in ASSESSMENT AND PLAN BY DIAGNOSIS    FOCUSED ORGAN SYSTEM SKIN EXAM (SKIN)  Face    Right Arm Normal except as noted below in Assessment   Left Arm Normal except as noted below in Assessment   Groin/Genitalia/Buttocks Viewed areas Normal except as noted below in Assessment       HIDRADENITIS SUPPURATIVA    Physical Exam:   Anatomic Location Affected:  Groin, buttock and armpits   Morphological Description:  Cystic nodules   Pertinent Positives:   Pertinent Negatives: Additional History of Present Condition:  Patient been applying the clindamycin; doesn't seem to be working; periods are regular; did have doxycycline in past as well-which failed    Assessment and Plan:  Based on a thorough discussion of this condition and the management approach to it (including a comprehensive discussion of the known risks, side effects and potential benefits of treatment), the patient (family) agrees to implement the following specific plan:   Discussed treatments; hormonal treatments of oral medication;   Can consider Humeria in future   Spironolactone 50 mg once daily in am; after 1 month gets increased to 100 mg; take with full glass of water; can increase urination   Take Zinc 90 mg (3/30mg) for 3 months in am   Get ordered blood work done in 1-2 months: CMP; TSH,  DHA, Copper    What is hidradenitis suppurativa? Hidradenitis suppurativa is an inflammatory skin disease that affects apocrine gland-bearing skin in the axillae, in the groin, and under the breasts  It is characterised by recurrent boil-like nodules and abscesses that culminate in pus-like discharge, difficult-to-heal open wounds (sinuses) and scarring  Hidradenitis suppurativa also has significant psychological impact and many patients suffer from impairment of body image, depression and anxiety  The term hidradenitis implies it starts as an inflammatory disorder of sweat glands, which is now known to be incorrect  Hidradenitis suppurativa is also known as acne inversa  Who gets hidradenitis suppurativa?   Hidradenitis often starts at puberty, and is most active between the ages of 21 and 36 years, and in women, can resolve at menopause  It is three times more common in females than in males  Risk factors include:   Other family members with hidradenitis suppurativa   Obesity and insulin resistance/metabolic syndrome   Cigarette smoking   Follicular occlusion disorders: acne conglobata, dissecting cellulitis, pilonidal sinus   Inflammatory bowel disease (Crohn disease)   Rare autoinflammatory syndromes associated with abnormalities of PSTPIP1 gene  *    * PAPA syndrome (pyogenic arthritis, pyoderma gangrenosum and acne), PASH syndrome (pyoderma gangrenosum, acne, suppurative hidradenitis) and PAPASH syndrome (pyogenic arthritis, pyoderma gangrenosum, acne, suppurative hidradenitis)  What causes hidradenitis suppurativa? Hidradenitis suppurativa is an autoinflammatory disorder  Although the exact cause is not yet understood, contributing factors include:   Friction from clothing and body folds   Aberrant immune response to commensal bacteria   Abnormal cutaneous or follicular microbiome   Follicular occlusion   Release of pro-inflammatory cytokines   Inflammation causing rupture of the follicular wall and destroying apocrine glands and ducts   Secondary bacterial infection   Certain drugs  What are the clinical features of hidradenitis suppurativa? Hidradenitis can affect a single or multiple areas in the armpits, neck, sub mammary area, and inner thighs  Anogenital involvement most commonly affects the groin, mons pubis, vulva (in females), sides of the scrotum (in males), perineum, buttocks and perianal folds  Signs include:   Open and closed comedones   Painful firm papules, larger nodules and pleated ridges   Pustules, fluctuant pseudocysts and abscesses   Pyogenic granulomas   Draining sinuses linking inflammatory lesions   Hypertrophic and atrophic scars  Many patients with hidradenitis suppurativa also suffer from other skin disorders, including acne, hirsutism and psoriasis      The severity and extent of hidradenitis suppurativa is recorded at assessment and when determining the impact of a treatment  The Arthur system describes three distinct clinical stages:  1  Solitary or multiple, isolated abscess formation without scarring or sinus tracts  2  Recurrent abscesses, single or multiple widely  lesions, with sinus tract formation  3  Diffuse or broad involvement, with multiple interconnected sinus tracts and abscesses  Severe hidradenitis (Arthur Stage 3) has been associated with:   Male sex   Axillary and perianal involvement   Obesity   Smoking   Higher risk of stroke, coronary artery disease, heart failure, and peripheral artery disease   Disease duration  What is the treatment for hidradenitis suppurativa? General measures   Weight loss; follow an anti-inflammatory, low-sugar, low-grain, low-dairy diet (mainly plants)   Smoking cessation: this can lead to improvement within a few months   Loose fitting clothing   Daily unfragranced antiperspirants   If prone to secondary infection, wash with antiseptics or take bleach baths   Apply hydrogen peroxide solution or medical grade honey to reduce malodour   Use peeling agents such as resorcinol 15% cream to de-roof nodules   Apply simple dressings to draining sinuses   Analgesics, such as paracetamol (acetaminophen), for pain control   Seek help to manage anxiety and depression  Medical management of hidradenitis suppurativa  Medical management of hidradenitis suppurativa is difficult  Treatment is required long term  Effective options are listed below      Antibiotics   Topical clindamycin, with benzoyl peroxide to reduce bacterial resistance   Short course of oral antibiotics for acute staphylococcal abscesses, eg flucloxacillin   Prolonged courses (minimum 3 months) of tetracycline, metronidazole, trimethoprim + sulphamethoxazole, fluoroquinolones, ertapenem or dapsone for their anti-inflammatory action   6-12 week courses of the combination of clindamycin (or doxycycline) and rifampicin for severe disease  Antiandrogens   Long-term oral contraceptive pill; antiandrogenic progesterones drospirenone or cyproterone acetate may be more effective than standard combined pills  These are more suitable than progesterone-only pills or devices   Spironolactone and finasteride   Response takes 6 months or longer  Immunomodulatory treatments for severe disease   Intralesional corticosteroids into nodules   Systemic corticosteroids short-term for flares   Methotrexate, ciclosporin, and azathioprine   TNF-? inhibitors adalimumab and infliximab, used in higher dose than required for psoriasis, are the most successful treatments to date  Note that paradoxically, they may sometimes induce new-onset hidradenitis suppurativa   Other biologics are under investigation, such as the IL-1? antagonist, canakinumab    Other medical treatments   Metformin in patients with insulin resistance   Acitretin (unsuitable for females of childbearing potential)   Isotretinoin -- effective for acne but appears unhelpful for most cases of hidradenitis   Colchicine   Medical management of anxiety and depression    Surgical management of hidradenitis suppurativa   Incision and drainage of acute abscesses   Curettage and deroofing of nodules, abscesses and sinuses   Laser ablation of nodules, abscesses and sinuses   Wide local excision of persistent nodules   Radical excisional surgery of entire affected area   Nd:YAG laser hair removal    MELANOCYTIC NEVI ("Moles")    Physical Exam:   Anatomic Location Affected:   Mostly on sun-exposed areas of the NOSE   Morphological Description:  Scattered, 1-4mm round to ovoid, symmetrical-appearing, even bordered, skin colored to dark brown macules/papules, mostly in sun-exposed areas   Pertinent Positives:   Pertinent Negatives:     Additional History of Present Condition:  Patient noticed new bump on nose    Assessment and Plan:  Based on a thorough discussion of this condition and the management approach to it (including a comprehensive discussion of the known risks, side effects and potential benefits of treatment), the patient (family) agrees to implement the following specific plan:   Reassured benign     Melanocytic Nevi  Melanocytic nevi ("moles") are tan or brown, raised or flat areas of the skin which have an increased number of melanocytes  Melanocytes are the cells in our body which make pigment and account for skin color  Some moles are present at birth (I e , "congenital nevi"), while others come up later in life (i e , "acquired nevi")  The sun can stimulate the body to make more moles  Sunburns are not the only thing that triggers more moles  Chronic sun exposure can do it too  Clinically distinguishing a healthy mole from melanoma may be difficult, even for experienced dermatologists  The "ABCDE's" of moles have been suggested as a means of helping to alert a person to a suspicious mole and the possible increased risk of melanoma  The suggestions for raising alert are as follows:    Asymmetry: Healthy moles tend to be symmetric, while melanomas are often asymmetric  Asymmetry means if you draw a line through the mole, the two halves do not match in color, size, shape, or surface texture  Asymmetry can be a result of rapid enlargement of a mole, the development of a raised area on a previously flat lesion, scaling, ulceration, bleeding or scabbing within the mole  Any mole that starts to demonstrate "asymmetry" should be examined promptly by a board certified dermatologist      Border: Healthy moles tend to have discrete, even borders  The border of a melanoma often blends into the normal skin and does not sharply delineate the mole from normal skin    Any mole that starts to demonstrate "uneven borders" should be examined promptly by a board certified dermatologist  Color: Healthy moles tend to be one color throughout  Melanomas tend to be made up of different colors ranging from dark black, blue, white, or red  Any mole that demonstrates a color change should be examined promptly by a board certified dermatologist      Diameter: Healthy moles tend to be smaller than 0 6 cm in size; an exception are "congenital nevi" that can be larger  Melanomas tend to grow and can often be greater than 0 6 cm (1/4 of an inch, or the size of a pencil eraser)  This is only a guideline, and many normal moles may be larger than 0 6 cm without being unhealthy  Any mole that starts to change in size (small to bigger or bigger to smaller) should be examined promptly by a board certified dermatologist      Evolving: Healthy moles tend to "stay the same "  Melanomas may often show signs of change or evolution such as a change in size, shape, color, or elevation  Any mole that starts to itch, bleed, crust, burn, hurt, or ulcerate or demonstrate a change or evolution should be examined promptly by a board certified dermatologist       Dysplastic Nevi  Dysplastic moles are moles that fit the ABCDE rules of melanoma but are not identified as melanomas when examined under the microscope  They may indicate an increased risk of melanoma in that person  If there is a family history of melanoma, most experts agree that the person may be at an increased risk for developing a melanoma  Experts still do not agree on what dysplastic moles mean in patients without a personal or family history of melanoma  Dysplastic moles are usually larger than common moles and have different colors within it with irregular borders  The appearance can be very similar to a melanoma  Biopsies of dysplastic moles may show abnormalities which are different from a regular mole  Melanoma  Malignant melanoma is a type of skin cancer that can be deadly if it spreads throughout the body   The incidence of melanoma in the [place] is growing faster than any other cancer  Melanoma usually grows near the surface of the skin for a period of time, and then begins to grow deeper into the skin  Once it grows deeper into the skin, the risk of spread to other organs greatly increases  Therefore, early detection and removal of a malignant melanoma may result in a better chance at a complete cure; removal after the tumor has spread may not be as effective, leading to worse clinical outcomes such as death  The true rate of nevus transformation into a melanoma is unknown  It has been estimated that the lifetime risk for any acquired melanocytic nevus on any 21year-old individual transforming into melanoma by age [de-identified] is 0 03% (1 in 3,164) for men and 0 009% (1 in 10,800) for women  The appearance of a "new mole" remains one of the most reliable methods for identifying a malignant melanoma  Occasionally, melanomas appear as rapidly growing, blue-black, dome-shaped bumps within a previous mole or previous area of normal skin  Other times, melanomas are suspected when a mole suddenly appears or changes  Itching, burning, or pain in a pigmented lesion should increase suspicion, but most patients with early melanoma have no skin discomfort whatsoever  Melanoma can occur anywhere on the skin, including areas that are difficult for self-examination  Many melanomas are first noticed by other family members  Suspicious-looking moles may be removed for microscopic examination  You may be able to prevent death from melanoma by doing two simple things:    1  Try to avoid unnecessary sun exposure and protect your skin when it is exposed to the sun  People who live near the equator, people who have intermittent exposures to large amounts of sun, and people who have had sunburns in childhood or adolescence have an increased risk for melanoma  Sun sense and vigilant sun protection may be keys to helping to prevent melanoma    We recommend wearing UPF-rated sun protective clothing and sunglasses whenever possible and applying a moisturizer-sunscreen combination product (SPF 50+) such as Neutrogena Daily Defense to sun exposed areas of skin at least three times a day  2  Have your moles regularly examined by a board certified dermatologist AND by yourself or a family member/friend at home  We recommend that you have your moles examined at least once a year by a board certified dermatologist   Use your birthday as an annual reminder to have your "Birthday Suit" (I e , your skin) examined; it is a nice birthday gift to yourself to know that your skin is healthy appearing! Additionally, at-home self examinations may be helpful for detecting a possible melanoma  Use the ABCDEs we discussed and check your moles once a month at home        Scribe Attestation    I,:  Krystal Armando am acting as a scribe while in the presence of the attending physician :       I,:  Sammy Anderson MD personally performed the services described in this documentation    as scribed in my presence :

## 2022-04-26 NOTE — PATIENT INSTRUCTIONS
HIDRADENITIS SUPPURATIVA    Assessment and Plan:  Based on a thorough discussion of this condition and the management approach to it (including a comprehensive discussion of the known risks, side effects and potential benefits of treatment), the patient (family) agrees to implement the following specific plan:   Discussed treatments; hormonal treatments of oral medication;   Can consider Humeria in future   Spironolactone 50 mg once daily in am; after 1 month gets increased to 100 mg; take with full glass of water; can increase urination   Take Zinc 90 mg (3/30mg)for 3 months in am   Get ordered blood work done in 1-2 months: CMP; TSH,  DHA, Copper    What is hidradenitis suppurativa? Hidradenitis suppurativa is an inflammatory skin disease that affects apocrine gland-bearing skin in the axillae, in the groin, and under the breasts  It is characterised by recurrent boil-like nodules and abscesses that culminate in pus-like discharge, difficult-to-heal open wounds (sinuses) and scarring  Hidradenitis suppurativa also has significant psychological impact and many patients suffer from impairment of body image, depression and anxiety  The term hidradenitis implies it starts as an inflammatory disorder of sweat glands, which is now known to be incorrect  Hidradenitis suppurativa is also known as acne inversa  Who gets hidradenitis suppurativa? Hidradenitis often starts at puberty, and is most active between the ages of 21 and 36 years, and in women, can resolve at menopause  It is three times more common in females than in males  Risk factors include:   Other family members with hidradenitis suppurativa   Obesity and insulin resistance/metabolic syndrome   Cigarette smoking   Follicular occlusion disorders: acne conglobata, dissecting cellulitis, pilonidal sinus   Inflammatory bowel disease (Crohn disease)   Rare autoinflammatory syndromes associated with abnormalities of PSTPIP1 gene  *    * PAPA syndrome (pyogenic arthritis, pyoderma gangrenosum and acne), PASH syndrome (pyoderma gangrenosum, acne, suppurative hidradenitis) and PAPASH syndrome (pyogenic arthritis, pyoderma gangrenosum, acne, suppurative hidradenitis)  What causes hidradenitis suppurativa? Hidradenitis suppurativa is an autoinflammatory disorder  Although the exact cause is not yet understood, contributing factors include:   Friction from clothing and body folds   Aberrant immune response to commensal bacteria   Abnormal cutaneous or follicular microbiome   Follicular occlusion   Release of pro-inflammatory cytokines   Inflammation causing rupture of the follicular wall and destroying apocrine glands and ducts   Secondary bacterial infection   Certain drugs  What are the clinical features of hidradenitis suppurativa? Hidradenitis can affect a single or multiple areas in the armpits, neck, sub mammary area, and inner thighs  Anogenital involvement most commonly affects the groin, mons pubis, vulva (in females), sides of the scrotum (in males), perineum, buttocks and perianal folds  Signs include:   Open and closed comedones   Painful firm papules, larger nodules and pleated ridges   Pustules, fluctuant pseudocysts and abscesses   Pyogenic granulomas   Draining sinuses linking inflammatory lesions   Hypertrophic and atrophic scars  Many patients with hidradenitis suppurativa also suffer from other skin disorders, including acne, hirsutism and psoriasis  The severity and extent of hidradenitis suppurativa is recorded at assessment and when determining the impact of a treatment  The [place] system describes three distinct clinical stages:  1  Solitary or multiple, isolated abscess formation without scarring or sinus tracts  2  Recurrent abscesses, single or multiple widely  lesions, with sinus tract formation  3  Diffuse or broad involvement, with multiple interconnected sinus tracts and abscesses      Severe hidradenitis Claire Gillis Stage 3) has been associated with:  Sofie Credit Male sex   Axillary and perianal involvement   Obesity   Smoking   Higher risk of stroke, coronary artery disease, heart failure, and peripheral artery disease   Disease duration  What is the treatment for hidradenitis suppurativa? General measures   Weight loss; follow an anti-inflammatory, low-sugar, low-grain, low-dairy diet (mainly plants)   Smoking cessation: this can lead to improvement within a few months   Loose fitting clothing   Daily unfragranced antiperspirants   If prone to secondary infection, wash with antiseptics or take bleach baths   Apply hydrogen peroxide solution or medical grade honey to reduce malodour   Use peeling agents such as resorcinol 15% cream to de-roof nodules   Apply simple dressings to draining sinuses   Analgesics, such as paracetamol (acetaminophen), for pain control   Seek help to manage anxiety and depression  Medical management of hidradenitis suppurativa  Medical management of hidradenitis suppurativa is difficult  Treatment is required long term  Effective options are listed below  Antibiotics   Topical clindamycin, with benzoyl peroxide to reduce bacterial resistance   Short course of oral antibiotics for acute staphylococcal abscesses, eg flucloxacillin   Prolonged courses (minimum 3 months) of tetracycline, metronidazole, trimethoprim + sulphamethoxazole, fluoroquinolones, ertapenem or dapsone for their anti-inflammatory action   6-12 week courses of the combination of clindamycin (or doxycycline) and rifampicin for severe disease  Antiandrogens   Long-term oral contraceptive pill; antiandrogenic progesterones drospirenone or cyproterone acetate may be more effective than standard combined pills  These are more suitable than progesterone-only pills or devices   Spironolactone and finasteride   Response takes 6 months or longer      Immunomodulatory treatments for severe disease   Intralesional corticosteroids into nodules   Systemic corticosteroids short-term for flares   Methotrexate, ciclosporin, and azathioprine   TNF-? inhibitors adalimumab and infliximab, used in higher dose than required for psoriasis, are the most successful treatments to date  Note that paradoxically, they may sometimes induce new-onset hidradenitis suppurativa   Other biologics are under investigation, such as the IL-1? antagonist, canakinumab    Other medical treatments   Metformin in patients with insulin resistance   Acitretin (unsuitable for females of childbearing potential)   Isotretinoin -- effective for acne but appears unhelpful for most cases of hidradenitis   Colchicine   Medical management of anxiety and depression    Surgical management of hidradenitis suppurativa   Incision and drainage of acute abscesses   Curettage and deroofing of nodules, abscesses and sinuses   Laser ablation of nodules, abscesses and sinuses   Wide local excision of persistent nodules   Radical excisional surgery of entire affected area   Nd:YAG laser hair removal    MELANOCYTIC NEVI ("Moles")    Assessment and Plan:  Based on a thorough discussion of this condition and the management approach to it (including a comprehensive discussion of the known risks, side effects and potential benefits of treatment), the patient (family) agrees to implement the following specific plan:   Reassured benign     Melanocytic Nevi  Melanocytic nevi ("moles") are tan or brown, raised or flat areas of the skin which have an increased number of melanocytes  Melanocytes are the cells in our body which make pigment and account for skin color  Some moles are present at birth (I e , "congenital nevi"), while others come up later in life (i e , "acquired nevi")  The sun can stimulate the body to make more moles  Sunburns are not the only thing that triggers more moles  Chronic sun exposure can do it too       Clinically distinguishing a healthy mole from melanoma may be difficult, even for experienced dermatologists  The "ABCDE's" of moles have been suggested as a means of helping to alert a person to a suspicious mole and the possible increased risk of melanoma  The suggestions for raising alert are as follows:    Asymmetry: Healthy moles tend to be symmetric, while melanomas are often asymmetric  Asymmetry means if you draw a line through the mole, the two halves do not match in color, size, shape, or surface texture  Asymmetry can be a result of rapid enlargement of a mole, the development of a raised area on a previously flat lesion, scaling, ulceration, bleeding or scabbing within the mole  Any mole that starts to demonstrate "asymmetry" should be examined promptly by a board certified dermatologist      Border: Healthy moles tend to have discrete, even borders  The border of a melanoma often blends into the normal skin and does not sharply delineate the mole from normal skin  Any mole that starts to demonstrate "uneven borders" should be examined promptly by a board certified dermatologist      Color: Healthy moles tend to be one color throughout  Melanomas tend to be made up of different colors ranging from dark black, blue, white, or red  Any mole that demonstrates a color change should be examined promptly by a board certified dermatologist      Diameter: Healthy moles tend to be smaller than 0 6 cm in size; an exception are "congenital nevi" that can be larger  Melanomas tend to grow and can often be greater than 0 6 cm (1/4 of an inch, or the size of a pencil eraser)  This is only a guideline, and many normal moles may be larger than 0 6 cm without being unhealthy    Any mole that starts to change in size (small to bigger or bigger to smaller) should be examined promptly by a board certified dermatologist      Evolving: Healthy moles tend to "stay the same "  Melanomas may often show signs of change or evolution such as a change in size, shape, color, or elevation  Any mole that starts to itch, bleed, crust, burn, hurt, or ulcerate or demonstrate a change or evolution should be examined promptly by a board certified dermatologist       Dysplastic Nevi  Dysplastic moles are moles that fit the ABCDE rules of melanoma but are not identified as melanomas when examined under the microscope  They may indicate an increased risk of melanoma in that person  If there is a family history of melanoma, most experts agree that the person may be at an increased risk for developing a melanoma  Experts still do not agree on what dysplastic moles mean in patients without a personal or family history of melanoma  Dysplastic moles are usually larger than common moles and have different colors within it with irregular borders  The appearance can be very similar to a melanoma  Biopsies of dysplastic moles may show abnormalities which are different from a regular mole  Melanoma  Malignant melanoma is a type of skin cancer that can be deadly if it spreads throughout the body  The incidence of melanoma in the [place] is growing faster than any other cancer  Melanoma usually grows near the surface of the skin for a period of time, and then begins to grow deeper into the skin  Once it grows deeper into the skin, the risk of spread to other organs greatly increases  Therefore, early detection and removal of a malignant melanoma may result in a better chance at a complete cure; removal after the tumor has spread may not be as effective, leading to worse clinical outcomes such as death  The true rate of nevus transformation into a melanoma is unknown  It has been estimated that the lifetime risk for any acquired melanocytic nevus on any 21year-old individual transforming into melanoma by age [de-identified] is 0 03% (1 in 3,164) for men and 0 009% (1 in 10,800) for women       The appearance of a "new mole" remains one of the most reliable methods for identifying a malignant melanoma  Occasionally, melanomas appear as rapidly growing, blue-black, dome-shaped bumps within a previous mole or previous area of normal skin  Other times, melanomas are suspected when a mole suddenly appears or changes  Itching, burning, or pain in a pigmented lesion should increase suspicion, but most patients with early melanoma have no skin discomfort whatsoever  Melanoma can occur anywhere on the skin, including areas that are difficult for self-examination  Many melanomas are first noticed by other family members  Suspicious-looking moles may be removed for microscopic examination  You may be able to prevent death from melanoma by doing two simple things:    1  Try to avoid unnecessary sun exposure and protect your skin when it is exposed to the sun  People who live near the equator, people who have intermittent exposures to large amounts of sun, and people who have had sunburns in childhood or adolescence have an increased risk for melanoma  Sun sense and vigilant sun protection may be keys to helping to prevent melanoma  We recommend wearing UPF-rated sun protective clothing and sunglasses whenever possible and applying a moisturizer-sunscreen combination product (SPF 50+) such as Neutrogena Daily Defense to sun exposed areas of skin at least three times a day  2  Have your moles regularly examined by a board certified dermatologist AND by yourself or a family member/friend at home  We recommend that you have your moles examined at least once a year by a board certified dermatologist   Use your birthday as an annual reminder to have your "Birthday Suit" (I e , your skin) examined; it is a nice birthday gift to yourself to know that your skin is healthy appearing! Additionally, at-home self examinations may be helpful for detecting a possible melanoma  Use the ABCDEs we discussed and check your moles once a month at home  (3) adequate

## 2022-05-03 ENCOUNTER — OFFICE VISIT (OUTPATIENT)
Dept: URGENT CARE | Age: 27
End: 2022-05-03
Payer: COMMERCIAL

## 2022-05-03 VITALS — OXYGEN SATURATION: 99 % | RESPIRATION RATE: 16 BRPM | TEMPERATURE: 97.4 F | HEART RATE: 91 BPM

## 2022-05-03 DIAGNOSIS — R05.9 COUGH: Primary | ICD-10-CM

## 2022-05-03 PROCEDURE — 87636 SARSCOV2 & INF A&B AMP PRB: CPT | Performed by: PHYSICIAN ASSISTANT

## 2022-05-03 PROCEDURE — 99283 EMERGENCY DEPT VISIT LOW MDM: CPT | Performed by: PHYSICIAN ASSISTANT

## 2022-05-03 PROCEDURE — G0382 LEV 3 HOSP TYPE B ED VISIT: HCPCS | Performed by: PHYSICIAN ASSISTANT

## 2022-05-03 NOTE — PATIENT INSTRUCTIONS
Cough   COVID test sent  Follow up with PCP in 3-5 days  Proceed to  ER if symptoms worsen  [address]    Your healthcare provider and/or public health staff have evaluated you and have determined that you do not need to remain in the hospital at this time  At this time you can be isolated at home where you will be monitored by staff from your local or state health department  You should carefully follow the prevention and isolation steps below until a healthcare provider or local or state health department says that you can return to your normal activities  Stay home except to get medical care    People who are mildly ill with COVID-19 are able to isolate at home during their illness  You should restrict activities outside your home, except for getting medical care  Do not go to work, school, or public areas  Avoid using public transportation, ride-sharing, or taxis  Separate yourself from other people and animals in your home    People: As much as possible, you should stay in a specific room and away from other people in your home  Also, you should use a separate bathroom, if available  Animals: You should restrict contact with pets and other animals while you are sick with COVID-19, just like you would around other people  Although there have not been reports of pets or other animals becoming sick with COVID-19, it is still recommended that people sick with COVID-19 limit contact with animals until more information is known about the virus  When possible, have another member of your household care for your animals while you are sick  If you are sick with COVID-19, avoid contact with your pet, including petting, snuggling, being kissed or licked, and sharing food  If you must care for your pet or be around animals while you are sick, wash your hands before and after you interact with pets and wear a facemask  See COVID-19 and Animals for more information      Call ahead before visiting your doctor    If you have a medical appointment, call the healthcare provider and tell them that you have or may have COVID-19  This will help the healthcare providers office take steps to keep other people from getting infected or exposed  Wear a facemask    You should wear a facemask when you are around other people (e g , sharing a room or vehicle) or pets and before you enter a healthcare providers office  If you are not able to wear a facemask (for example, because it causes trouble breathing), then people who live with you should not stay in the same room with you, or they should wear a facemask if they enter your room  Cover your coughs and sneezes    Cover your mouth and nose with a tissue when you cough or sneeze  Throw used tissues in a lined trash can  Immediately wash your hands with soap and water for at least 20 seconds or, if soap and water are not available, clean your hands with an alcohol-based hand  that contains at least 60% alcohol  Clean your hands often    Wash your hands often with soap and water for at least 20 seconds, especially after blowing your nose, coughing, or sneezing; going to the bathroom; and before eating or preparing food  If soap and water are not readily available, use an alcohol-based hand  with at least 60% alcohol, covering all surfaces of your hands and rubbing them together until they feel dry  Soap and water are the best option if hands are visibly dirty  Avoid touching your eyes, nose, and mouth with unwashed hands  Avoid sharing personal household items    You should not share dishes, drinking glasses, cups, eating utensils, towels, or bedding with other people or pets in your home  After using these items, they should be washed thoroughly with soap and water      Clean all high-touch surfaces everyday    High touch surfaces include counters, tabletops, doorknobs, bathroom fixtures, toilets, phones, keyboards, tablets, and bedside tables  Also, clean any surfaces that may have blood, stool, or body fluids on them  Use a household cleaning spray or wipe, according to the label instructions  Labels contain instructions for safe and effective use of the cleaning product including precautions you should take when applying the product, such as wearing gloves and making sure you have good ventilation during use of the product  Monitor your symptoms    Seek prompt medical attention if your illness is worsening (e g , difficulty breathing)  Before seeking care, call your healthcare provider and tell them that you have, or are being evaluated for, COVID-19  Put on a facemask before you enter the facility  These steps will help the healthcare providers office to keep other people in the office or waiting room from getting infected or exposed  Ask your healthcare provider to call the local or [facility] health department  Persons who are placed under active monitoring or facilitated self-monitoring should follow instructions provided by their local health department or occupational health professionals, as appropriate  If you have a medical emergency and need to call 911, notify the dispatch personnel that you have, or are being evaluated for COVID-19  If possible, put on a facemask before emergency medical services arrive      Discontinuing home isolation    Patients with confirmed COVID-19 should remain under home isolation precautions until the following conditions are met:   - They have had no fever for at least 24 hours (that is one full day of no fever without the use medicine that reduces fevers)  AND  - other symptoms have improved (for example, when their cough or shortness of breath have improved)  AND  - If had mild or moderate illness, at least 10 days have passed since their symptoms first appeared or if severe illness (needed oxygen) or immunosuppressed, at least 20 days have passed since symptoms first appeared  Patients with confirmed COVID-19 should also notify close contacts (including their workplace) and ask that they self-quarantine  Currently, close contact is defined as being within 6 feet for 15 minutes or more from the period 24 hours starting 48 hours before symptom onset to the time at which the patient went into isolation  Close contacts of patients diagnosed with COVID-19 should be instructed by the patient to self-quarantine for 14 days from the last time of their last contact with the patient       Source: [employer]

## 2022-05-03 NOTE — PROGRESS NOTES
[facility]        NAME: Jannet Saeed is a 27 y o  female  : 1995    MRN: [id number]  DATE: May 3, 2022  TIME: 7:22 PM    Assessment and Plan   Cough [R05 9]  1  Cough  Covid/Flu-Office Collect         Patient Instructions     Cough   COVID test sent  Follow up with PCP in 3-5 days  Proceed to  ER if symptoms worsen  Chief Complaint     Chief Complaint   Patient presents with    Cough     sneezing, congestion, since yesterday         History of Present Illness       24-year-old female who presents complaining of cough, congestion, sneezing times 24 hours  Patient states she needs a COVID test for work  Denies chest pain, shortness off breath, fevers    Cough  Associated symptoms include a fever and rhinorrhea  Pertinent negatives include no chills, ear pain, postnasal drip, sore throat, shortness of breath or wheezing  Review of Systems   Review of Systems   Constitutional: Positive for fever  Negative for activity change, appetite change, chills, diaphoresis and fatigue  HENT: Positive for congestion and rhinorrhea  Negative for ear discharge, ear pain, facial swelling, hearing loss, mouth sores, nosebleeds, postnasal drip, sinus pressure, sinus pain, sneezing, sore throat and voice change  Respiratory: Positive for cough  Negative for apnea, choking, chest tightness, shortness of breath, wheezing and stridor  Cardiovascular: Negative            Current Medications       Current Outpatient Medications:     acetaminophen (TYLENOL) 500 mg tablet, Take 500-1,000 mg by mouth every 6 (six) hours as needed for mild pain, Disp: , Rfl:     clindamycin (CLEOCIN T) 1 % lotion, Apply topically 2 (two) times a day, Disp: 60 mL, Rfl: 0    spironolactone (ALDACTONE) 50 mg tablet, Take once daily in am with full glass of water, Disp: 90 tablet, Rfl: 1    DANDELION ROOT PO, Take by mouth in the morning (Patient not taking: Reported on 2022 ), Disp: , Rfl:     docusate sodium (COLACE) 100 mg capsule, Take 1 capsule (100 mg total) by mouth 2 (two) times a day as needed for constipation (Patient not taking: Reported on 2/18/2022 ), Disp: 10 capsule, Rfl: 0    Omega-3 Fatty Acids (FISH OIL PO), Take by mouth in the morning (Patient not taking: Reported on 4/22/2022 ), Disp: , Rfl:     RESVERATROL PO, Take by mouth in the morning (Patient not taking: Reported on 4/22/2022 ), Disp: , Rfl:     Turmeric 500 MG CAPS, Take by mouth in the morning (Patient not taking: Reported on 4/22/2022 ), Disp: , Rfl:     VITAMIN A PO, Take by mouth every other day (Patient not taking: Reported on 4/22/2022 ), Disp: , Rfl:     Current Allergies     Allergies as of 05/03/2022 - Reviewed 05/03/2022   Allergen Reaction Noted    Oxycodone-acetaminophen Drowsiness 07/03/2018    Percolone [oxycodone] GI Intolerance 06/01/2017            The following portions of the patient's history were reviewed and updated as appropriate: allergies, current medications, past family history, past medical history, past social history, past surgical history and problem list      Past Medical History:   Diagnosis Date    Abnormal Pap smear of cervix     Hidradenitis     gali buttocks and axilla - open areas - draining intermittently    History of COVID-19     12/21/21- no hospitalization    HPV in female     Wears glasses        Past Surgical History:   Procedure Laterality Date    AXILLARY HIDRADENITIS EXCISION Left 3/15/2022    Procedure: EXCISION HIDRADENITIS AXILLARY;  Surgeon: Yuriy Luu MD;  Location: [facility];  Service: General    SKIN BIOPSY      WOUND DEBRIDEMENT Bilateral 2/11/2022    Procedure: EXCISIONAL DEBRIDEMENT of bilateral buttocks;   Surgeon: Yuriy Luu MD;  Location: [facility];  Service: General       Family History   Problem Relation Age of Onset    Diabetes Maternal Grandmother     Hypertension Maternal Grandmother     Diabetes Paternal Grandmother          Medications have been verified  Objective   Pulse 91   Temp (!) 97 4 °F (36 3 °C)   Resp 16   SpO2 99%        Physical Exam     Physical Exam  Constitutional:       General: She is not in acute distress  Appearance: Normal appearance  She is well-developed  She is not diaphoretic  HENT:      Head: Normocephalic and atraumatic  Right Ear: Hearing, tympanic membrane, ear canal and external ear normal       Left Ear: Hearing, tympanic membrane, ear canal and external ear normal       Nose: Rhinorrhea present  Mouth/Throat:      Pharynx: Uvula midline  Cardiovascular:      Rate and Rhythm: Normal rate and regular rhythm  Heart sounds: Normal heart sounds  Pulmonary:      Effort: Pulmonary effort is normal  No respiratory distress  Breath sounds: Normal breath sounds  No stridor  No wheezing, rhonchi or rales  Chest:      Chest wall: No tenderness  Musculoskeletal:      Cervical back: Normal range of motion and neck supple  Lymphadenopathy:      Cervical: Cervical adenopathy present  Neurological:      Mental Status: She is alert

## 2022-05-04 LAB
FLUAV RNA RESP QL NAA+PROBE: NEGATIVE
FLUBV RNA RESP QL NAA+PROBE: NEGATIVE
SARS-COV-2 RNA RESP QL NAA+PROBE: NEGATIVE

## 2022-05-11 ENCOUNTER — OFFICE VISIT (OUTPATIENT)
Dept: SURGERY | Facility: CLINIC | Age: 27
End: 2022-05-11
Payer: COMMERCIAL

## 2022-05-11 VITALS
WEIGHT: 200 LBS | OXYGEN SATURATION: 96 % | HEART RATE: 86 BPM | BODY MASS INDEX: 33.32 KG/M2 | HEIGHT: 65 IN | RESPIRATION RATE: 16 BRPM | DIASTOLIC BLOOD PRESSURE: 86 MMHG | SYSTOLIC BLOOD PRESSURE: 118 MMHG | TEMPERATURE: 98 F

## 2022-05-11 DIAGNOSIS — L73.2 HIDRADENITIS SUPPURATIVA: Primary | ICD-10-CM

## 2022-05-11 PROCEDURE — 99213 OFFICE O/P EST LOW 20 MIN: CPT | Performed by: SURGERY

## 2022-05-15 NOTE — PROGRESS NOTES
Assessment/Plan:     1  Hidradenitis axillaris  -     clindamycin (CLEOCIN) 300 MG capsule; Take 1 capsule (300 mg total) by mouth 2 (two) times a day for 10 days      topical cleocin for right axilla  Left is healing well  If no active groin lesions can proceed with hair removal  F/u in 2 weeks to reexamine the right axilla for improvement  Subjective:      Patient ID: Elsie Keys is a 27 y o  female  Triage Notes:    Effie Marrero is following up for hidradenitis  She underwent excision of painful inflamed lesions of the buttocks followed by the left axilla  She does now report more active areas in the right axilla with pain/tenderness and drainage  No fevers or chills  Feels the buttocks/groin is doing ok  The following portions of the patient's history were reviewed and updated as appropriate: allergies, current medications, past family history, past medical history, past social history, past surgical history and problem list     Review of Systems   Constitutional: Negative for activity change and appetite change  HENT: Negative for congestion, hearing loss, sore throat and trouble swallowing  Eyes: Negative for discharge and visual disturbance  Respiratory: Negative for cough, chest tightness, shortness of breath and wheezing  Cardiovascular: Negative for chest pain and palpitations  Gastrointestinal: Negative for abdominal pain  Endocrine: Negative for cold intolerance and heat intolerance  Genitourinary: Negative for difficulty urinating  Musculoskeletal: Negative for gait problem  Skin: Positive for wound  Negative for color change and rash  Neurological: Negative for dizziness, speech difficulty and headaches  Psychiatric/Behavioral: Negative for behavioral problems and confusion  The patient is not nervous/anxious            Objective:      /84   Ht 5' 5" (1 651 m)   Wt 94 3 kg (208 lb)   BMI 34 61 kg/m²     Below is the patient's most recent value for Albumin, ALT, AST, BUN, Calcium, Chloride, Cholesterol, CO2, Creatinine, GFR, Glucose, HDL, Hematocrit, Hemoglobin, Hemoglobin A1C, LDL, Magnesium, Phosphorus, Platelets, Potassium, PSA, Sodium, Triglycerides, and WBC  Lab Results   Component Value Date    BUN 10 02/10/2022    CALCIUM 9 4 02/10/2022     02/10/2022    CO2 27 02/10/2022    CREATININE 0 82 02/10/2022    HCT 35 4 02/10/2022    HGB 10 9 (L) 02/10/2022     (H) 02/10/2022    K 4 0 02/10/2022    WBC 6 56 02/10/2022     Note: for a comprehensive list of the patient's lab results, access the Results Review activity  Physical Exam  Vitals and nursing note reviewed  Constitutional:       General: She is not in acute distress  Appearance: She is well-developed  She is not diaphoretic  HENT:      Head: Normocephalic and atraumatic  Mouth/Throat:      Mouth: Mucous membranes are moist    Eyes:      General: No scleral icterus  Pupils: Pupils are equal, round, and reactive to light  Cardiovascular:      Rate and Rhythm: Normal rate and regular rhythm  Pulmonary:      Effort: Pulmonary effort is normal  No respiratory distress  Abdominal:      Palpations: Abdomen is soft  Musculoskeletal:         General: Normal range of motion  Cervical back: Normal range of motion and neck supple  Skin:     General: Skin is warm and dry  Comments: Right axilla there are a few sinuses with open wounds and active drainage  In the left axilla the wound is granulating but there are a few areas of hypergranulation that are treated again with silver nitrate - this is improving   Neurological:      Mental Status: She is alert and oriented to person, place, and time  Psychiatric:         Mood and Affect: Mood normal          Behavior: Behavior normal          Thought Content:  Thought content normal          Judgment: Judgment normal              Procedures

## 2022-06-01 NOTE — PROGRESS NOTES
Assessment/Plan:    Pathology Results:     1  Hidradenitis suppurativa      She wants to allow a few weeks to allow for the new medication to take effect which I think is reasonable  She will make a followup and let me know if she has any worsening of her symptoms and if needed dermatology can refer her back to me  F/u prn  Subjective:      Patient ID: Eliecer Kent is a 27 y o  female  Triage Notes:    Adriana Kohler is following up for hidradenitis  She underwent excision of painful inflamed lesions of the buttocks followed by the left axilla  She does now report some mild improvement of the right axilla with pain/tenderness and drainage - it is not fully resolved but she thinks she can tolerate it  No fevers or chills  Feels the buttocks/groin and left axilla is doing ok  She saw dermatology and started on spironolactone with room to increase the dosage  The following portions of the patient's history were reviewed and updated as appropriate: allergies, current medications, past family history, past medical history, past social history, past surgical history and problem list     Review of Systems   Constitutional: Negative for activity change and appetite change  HENT: Negative for congestion, hearing loss, sore throat and trouble swallowing  Eyes: Negative for discharge and visual disturbance  Respiratory: Negative for cough, chest tightness, shortness of breath and wheezing  Cardiovascular: Negative for chest pain and palpitations  Gastrointestinal: Negative for abdominal pain  Endocrine: Negative for cold intolerance and heat intolerance  Genitourinary: Negative for difficulty urinating  Musculoskeletal: Negative for gait problem  Skin: Positive for wound  Negative for color change and rash  Neurological: Negative for dizziness, speech difficulty and headaches  Psychiatric/Behavioral: Negative for behavioral problems and confusion  The patient is not nervous/anxious  Objective:      /86   Pulse 86   Temp 98 °F (36 7 °C) (Temporal)   Resp 16   Ht 5' 5" (1 651 m)   Wt 90 7 kg (200 lb)   SpO2 96%   BMI 33 28 kg/m²     Below is the patient's most recent value for Albumin, ALT, AST, BUN, Calcium, Chloride, Cholesterol, CO2, Creatinine, GFR, Glucose, HDL, Hematocrit, Hemoglobin, Hemoglobin A1C, LDL, Magnesium, Phosphorus, Platelets, Potassium, PSA, Sodium, Triglycerides, and WBC  Lab Results   Component Value Date    BUN 10 02/10/2022    CALCIUM 9 4 02/10/2022     02/10/2022    CO2 27 02/10/2022    CREATININE 0 82 02/10/2022    HCT 35 4 02/10/2022    HGB 10 9 (L) 02/10/2022     (H) 02/10/2022    K 4 0 02/10/2022    WBC 6 56 02/10/2022     Note: for a comprehensive list of the patient's lab results, access the Results Review activity  Physical Exam  Vitals and nursing note reviewed  Constitutional:       General: She is not in acute distress  Appearance: She is well-developed  She is not diaphoretic  HENT:      Head: Normocephalic and atraumatic  Mouth/Throat:      Mouth: Mucous membranes are moist    Eyes:      General: No scleral icterus  Pupils: Pupils are equal, round, and reactive to light  Cardiovascular:      Rate and Rhythm: Normal rate and regular rhythm  Pulmonary:      Effort: Pulmonary effort is normal  No respiratory distress  Abdominal:      Palpations: Abdomen is soft  Musculoskeletal:         General: Normal range of motion  Cervical back: Normal range of motion and neck supple  Skin:     General: Skin is warm and dry  Comments: Right axilla there are a few sinuses with open wounds and minimal drainage - mildly improved  In the left axilla the wound is closed except a 2mm area treated with silver nitrate   Neurological:      Mental Status: She is alert and oriented to person, place, and time     Psychiatric:         Mood and Affect: Mood normal          Behavior: Behavior normal          Thought Content:  Thought content normal          Judgment: Judgment normal              Procedures

## 2022-06-30 ENCOUNTER — OFFICE VISIT (OUTPATIENT)
Dept: URGENT CARE | Age: 27
End: 2022-06-30
Payer: COMMERCIAL

## 2022-06-30 VITALS
HEART RATE: 103 BPM | SYSTOLIC BLOOD PRESSURE: 132 MMHG | OXYGEN SATURATION: 99 % | TEMPERATURE: 97.5 F | DIASTOLIC BLOOD PRESSURE: 66 MMHG | RESPIRATION RATE: 18 BRPM

## 2022-06-30 DIAGNOSIS — L73.2 HYDRADENITIS: Primary | ICD-10-CM

## 2022-06-30 PROCEDURE — 99283 EMERGENCY DEPT VISIT LOW MDM: CPT | Performed by: NURSE PRACTITIONER

## 2022-06-30 PROCEDURE — G0382 LEV 3 HOSP TYPE B ED VISIT: HCPCS | Performed by: NURSE PRACTITIONER

## 2022-06-30 RX ORDER — DOXYCYCLINE 100 MG/1
100 TABLET ORAL 2 TIMES DAILY
Qty: 14 TABLET | Refills: 0 | Status: SHIPPED | OUTPATIENT
Start: 2022-06-30 | End: 2022-07-07

## 2022-06-30 NOTE — PROGRESS NOTES
[facility]        NAME: Alfredo Lim is a 27 y o  female  : 1995    MRN: [id number]  DATE: 2022  TIME: 4:17 PM    Assessment and Plan   Hydradenitis [L73 2]  1  Hydradenitis  doxycycline (ADOXA) 100 MG tablet         Patient Instructions     Take med as directed  Keep appt with with dermatology as scheduled   Follow up with PCP in 3-5 days  Proceed to  ER if symptoms worsen  Chief Complaint     Chief Complaint   Patient presents with    Abscess     Abscess in right armpit, it drained yesterday, needs work note, no longer draining, no reddness or swelling         History of Present Illness       HPI   Reports abscess on the right underarm  Has a Hx of hydradenitis  Flare-up started yesterday and states it was very painful and started draining and by today  The pain has subsided  Has an appt with dermatology in 2 days  She states she was out of work bc of it, yesterday     Review of Systems   Review of Systems   Constitutional: Negative for chills and fever  Skin: Positive for wound (right underarm)  Negative for color change  Neurological: Negative for numbness           Current Medications       Current Outpatient Medications:     doxycycline (ADOXA) 100 MG tablet, Take 1 tablet (100 mg total) by mouth 2 (two) times a day for 7 days, Disp: 14 tablet, Rfl: 0    spironolactone (ALDACTONE) 50 mg tablet, Take once daily in am with full glass of water, Disp: 90 tablet, Rfl: 1    acetaminophen (TYLENOL) 500 mg tablet, Take 500-1,000 mg by mouth every 6 (six) hours as needed for mild pain, Disp: , Rfl:     clindamycin (CLEOCIN T) 1 % lotion, Apply topically 2 (two) times a day (Patient not taking: Reported on 2022), Disp: 60 mL, Rfl: 0    DANDELION ROOT PO, Take by mouth in the morning (Patient not taking: Reported on 2022), Disp: , Rfl:     docusate sodium (COLACE) 100 mg capsule, Take 1 capsule (100 mg total) by mouth 2 (two) times a day as needed for constipation (Patient not taking: Reported on 6/30/2022), Disp: 10 capsule, Rfl: 0    Omega-3 Fatty Acids (FISH OIL PO), Take by mouth in the morning (Patient not taking: Reported on 6/30/2022), Disp: , Rfl:     RESVERATROL PO, Take by mouth in the morning (Patient not taking: Reported on 6/30/2022), Disp: , Rfl:     Turmeric 500 MG CAPS, Take by mouth in the morning (Patient not taking: Reported on 6/30/2022), Disp: , Rfl:     VITAMIN A PO, Take by mouth every other day (Patient not taking: Reported on 6/30/2022), Disp: , Rfl:     Current Allergies     Allergies as of 06/30/2022 - Reviewed 06/30/2022   Allergen Reaction Noted    Oxycodone-acetaminophen Drowsiness 07/03/2018    Percolone [oxycodone] GI Intolerance 06/01/2017            The following portions of the patient's history were reviewed and updated as appropriate: allergies, current medications, past family history, past medical history, past social history, past surgical history and problem list      Past Medical History:   Diagnosis Date    Abnormal Pap smear of cervix     Hidradenitis     gali buttocks and axilla - open areas - draining intermittently    History of COVID-19     12/21/21- no hospitalization    HPV in female     Wears glasses        Past Surgical History:   Procedure Laterality Date    AXILLARY HIDRADENITIS EXCISION Left 3/15/2022    Procedure: EXCISION HIDRADENITIS AXILLARY;  Surgeon: Tatiana Hernadez MD;  Location: [facility];  Service: General    SKIN BIOPSY      WOUND DEBRIDEMENT Bilateral 2/11/2022    Procedure: EXCISIONAL DEBRIDEMENT of bilateral buttocks; Surgeon: Tatiana Hernadez MD;  Location: [facility];  Service: General       Family History   Problem Relation Age of Onset    Diabetes Maternal Grandmother     Hypertension Maternal Grandmother     Diabetes Paternal Grandmother          Medications have been verified          Objective   /66   Pulse 103   Temp 97 5 °F (36 4 °C)   Resp 18   SpO2 99%   No LMP recorded  Physical Exam     Physical Exam  Constitutional:       Appearance: She is not ill-appearing or diaphoretic  Musculoskeletal:         General: Swelling (right underarm) and tenderness (with squeezing of the abscess  small amount of pus removed) present  No deformity  Skin:     Findings: Erythema present

## 2022-06-30 NOTE — LETTER
June 30, 2022     Patient: Leandro Mckee   YOB: 1995   Date of Visit: 6/30/2022       To Whom it May Concern:    Leandro Mckee was seen in my clinic on 6/30/2022  She may return to work on 07/01/2022  She states she was out of work yesterday 06/29/2022 for the same medical condition  If you have any questions or concerns, please don't hesitate to call           Sincerely,          DANIELLE Zhang        CC: No Recipients

## 2022-07-29 ENCOUNTER — OFFICE VISIT (OUTPATIENT)
Dept: SURGERY | Facility: CLINIC | Age: 27
End: 2022-07-29
Payer: COMMERCIAL

## 2022-07-29 VITALS
HEIGHT: 65 IN | TEMPERATURE: 98 F | DIASTOLIC BLOOD PRESSURE: 84 MMHG | SYSTOLIC BLOOD PRESSURE: 120 MMHG | WEIGHT: 195 LBS | HEART RATE: 69 BPM | OXYGEN SATURATION: 99 % | RESPIRATION RATE: 16 BRPM | BODY MASS INDEX: 32.49 KG/M2

## 2022-07-29 DIAGNOSIS — L73.2 HIDRADENITIS AXILLARIS: Primary | ICD-10-CM

## 2022-07-29 PROCEDURE — 99214 OFFICE O/P EST MOD 30 MIN: CPT | Performed by: SURGERY

## 2022-07-29 NOTE — PROGRESS NOTES
Assessment/Plan:     1  Hidradenitis axillaris  -     Case request operating room: EXCISION HIDRADENITIS AXILLARY; Standing  -     CBC and differential; Future  -     Basic metabolic panel; Future  -     Case request operating room: EXCISION HIDRADENITIS AXILLARY      Will plan for excision of right axillay hidradenitis  We reviewed the risks not limited to bleeding, infection, recurrence, damage to other structures and I outlined on her skin the likely incision but she understands this will be determined based on the extend of dermal and subdermal disease at the time of surgery  Consent signed  Subjective:      Patient ID: Saba Fraire is a 24 y o  female  Triage Notes:    Azra Atkinson is following up for hidradenitis  She underwent excision of painful inflamed lesions of the buttocks followed by the left axilla  Still feels the buttocks/groin and left axilla is doing ok  She saw dermatology and started on spironolactone and was last seen in May  But the right axilla is becoming more painful and draining constantly and she would like to pursue excision  The following portions of the patient's history were reviewed and updated as appropriate: allergies, current medications, past family history, past medical history, past social history, past surgical history and problem list     Review of Systems   Constitutional: Negative for activity change and appetite change  HENT: Negative for congestion, hearing loss, sore throat and trouble swallowing  Eyes: Negative for discharge and visual disturbance  Respiratory: Negative for cough, chest tightness, shortness of breath and wheezing  Cardiovascular: Negative for chest pain and palpitations  Gastrointestinal: Negative for abdominal pain  Endocrine: Negative for cold intolerance and heat intolerance  Genitourinary: Negative for difficulty urinating  Musculoskeletal: Negative for gait problem  Skin: Positive for wound   Negative for color change and rash  Neurological: Negative for dizziness, speech difficulty and headaches  Psychiatric/Behavioral: Negative for behavioral problems and confusion  The patient is not nervous/anxious  Objective:      /84   Pulse 69   Temp 98 °F (36 7 °C) (Temporal)   Resp 16   Ht 5' 5" (1 651 m)   Wt 88 5 kg (195 lb)   SpO2 99%   BMI 32 45 kg/m²     Below is the patient's most recent value for Albumin, ALT, AST, BUN, Calcium, Chloride, Cholesterol, CO2, Creatinine, GFR, Glucose, HDL, Hematocrit, Hemoglobin, Hemoglobin A1C, LDL, Magnesium, Phosphorus, Platelets, Potassium, PSA, Sodium, Triglycerides, and WBC  Lab Results   Component Value Date    BUN 10 02/10/2022    CALCIUM 9 4 02/10/2022     02/10/2022    CO2 27 02/10/2022    CREATININE 0 82 02/10/2022    HCT 35 4 02/10/2022    HGB 10 9 (L) 02/10/2022     (H) 02/10/2022    K 4 0 02/10/2022    WBC 6 56 02/10/2022     Note: for a comprehensive list of the patient's lab results, access the Results Review activity  Physical Exam  Vitals and nursing note reviewed  Constitutional:       General: She is not in acute distress  Appearance: She is well-developed  She is not diaphoretic  HENT:      Head: Normocephalic and atraumatic  Eyes:      Pupils: Pupils are equal, round, and reactive to light  Cardiovascular:      Rate and Rhythm: Normal rate and regular rhythm  Pulmonary:      Effort: Pulmonary effort is normal       Breath sounds: Normal breath sounds  Abdominal:      Palpations: Abdomen is soft  Musculoskeletal:         General: Normal range of motion  Cervical back: Normal range of motion and neck supple  Skin:     General: Skin is warm and dry  Comments: In the right axilla there are at least 3 draining sinuses throughout with some induration, no blanching erythema or overt fluctuance  Neurological:      Mental Status: She is alert and oriented to person, place, and time     Psychiatric: Mood and Affect: Mood normal          Behavior: Behavior normal          Thought Content:  Thought content normal          Judgment: Judgment normal              Procedures

## 2022-08-01 ENCOUNTER — TELEPHONE (OUTPATIENT)
Dept: SURGERY | Facility: CLINIC | Age: 27
End: 2022-08-01

## 2022-08-01 NOTE — TELEPHONE ENCOUNTER
Lm for pt to call back  Insurance was e-rejected in epic   Wanted clarification from pt to obtain a  Prior auth for surgery sched 9/2

## 2022-08-02 ENCOUNTER — OFFICE VISIT (OUTPATIENT)
Dept: DERMATOLOGY | Facility: CLINIC | Age: 27
End: 2022-08-02
Payer: COMMERCIAL

## 2022-08-02 VITALS — WEIGHT: 194 LBS | HEIGHT: 65 IN | BODY MASS INDEX: 32.32 KG/M2

## 2022-08-02 DIAGNOSIS — L73.2 HIDRADENITIS SUPPURATIVA: Primary | ICD-10-CM

## 2022-08-02 PROCEDURE — 99214 OFFICE O/P EST MOD 30 MIN: CPT | Performed by: DERMATOLOGY

## 2022-08-02 RX ORDER — CLINDAMYCIN PHOSPHATE 10 UG/ML
LOTION TOPICAL 2 TIMES DAILY
Qty: 60 ML | Refills: 0 | Status: SHIPPED | OUTPATIENT
Start: 2022-08-02 | End: 2022-08-31

## 2022-08-02 RX ORDER — SPIRONOLACTONE 100 MG/1
100 TABLET, FILM COATED ORAL DAILY
Qty: 30 TABLET | Refills: 3 | Status: SHIPPED | OUTPATIENT
Start: 2022-08-02 | End: 2022-09-07

## 2022-08-02 NOTE — PROGRESS NOTES
[facility] Follow Up Note    Patient Name: Bryce Amaya  Encounter Date: 8/2/22    Today's Chief Concerns:  Concern #1:  Follow up Hidradenitis suppurativa      Current Medications:    Current Outpatient Medications:     acetaminophen (TYLENOL) 500 mg tablet, Take 500-1,000 mg by mouth every 6 (six) hours as needed for mild pain, Disp: , Rfl:     clindamycin (CLEOCIN T) 1 % lotion, Apply topically 2 (two) times a day, Disp: 60 mL, Rfl: 0    spironolactone (ALDACTONE) 100 mg tablet, Take 1 tablet (100 mg total) by mouth daily, Disp: 30 tablet, Rfl: 3    DANDELION ROOT PO, Take by mouth in the morning (Patient not taking: Reported on 8/2/2022), Disp: , Rfl:     docusate sodium (COLACE) 100 mg capsule, Take 1 capsule (100 mg total) by mouth 2 (two) times a day as needed for constipation (Patient not taking: Reported on 8/2/2022), Disp: 10 capsule, Rfl: 0    Omega-3 Fatty Acids (FISH OIL PO), Take by mouth in the morning (Patient not taking: Reported on 8/2/2022), Disp: , Rfl:     RESVERATROL PO, Take by mouth in the morning (Patient not taking: Reported on 8/2/2022), Disp: , Rfl:     Turmeric 500 MG CAPS, Take by mouth in the morning (Patient not taking: Reported on 8/2/2022), Disp: , Rfl:     VITAMIN A PO, Take by mouth every other day, Disp: , Rfl:     CONSTITUTIONAL:   Vitals:    08/02/22 1116   Weight: 88 kg (194 lb)   Height: 5' 5" (1 651 m)           Specific Alerts:    Have you been seen by a [facility] in the last 3 years? YES    Are you pregnant or planning to become pregnant? No    Are you currently or planning to be nursing or breast feeding? No    Allergies   Allergen Reactions    Oxycodone-Acetaminophen Drowsiness    Percolone [Oxycodone] GI Intolerance       May we call your Preferred Phone number to discuss your specific medical information? YES    May we leave a detailed message that includes your specific medical information?  YES    Have you traveled outside of the [address] [place] [address] in the past 3 months? No    Do you currently have a pacemaker or defibrillator? No    Do you have any artificial heart valves, joints, plates, screws, rods, stents, pins, etc? No   - If Yes, were any placed within the last 2 years? Do you require any medications prior to a surgical procedure? No   - If Yes, for which procedure? - If Yes, what medications to you require? Are you taking any medications that cause you to bleed more easily ("blood thinners") No    Have you ever experienced a rapid heartbeat with epinephrine? No      Review of Systems:  Have you recently had or currently have any of the following?     Fever or chills: No  Night Sweats: No  Headaches: No  Weight Gain: No  Weight Loss: No  Blurry Vision: No  Nausea: No  Vomiting: No  Diarrhea: No  Blood in Stool: No  Abdominal Pain: No  Itchy Skin: No  Painful Joints: No  Swollen Joints: No  Muscle Pain: No  Irregular Mole: No  Sun Burn: No  Dry Skin: No  Skin Color Changes: No  Scar or Keloid: No  Cold Sores/Fever Blisters: No  Bacterial Infections/MRSA: No  Anxiety: No  Depression: No  Suicidal or Homicidal Thoughts: No      PSYCH: Normal mood and affect  EYES: Normal conjunctiva  ENT: Normal lips and oral mucosa  CARDIOVASCULAR: No edema  RESPIRATORY: Normal respirations  HEME/LYMPH/IMMUNO:  No regional lymphadenopathy except as noted below in ASSESSMENT AND PLAN BY DIAGNOSIS    FULL ORGAN SYSTEM SKIN EXAM (SKIN)  Hair, Scalp, Ears, Face Normal except as noted below in Assessment   Neck, Cervical Chain Nodes Normal except as noted below in Assessment   Right Arm/Hand/Fingers Normal except as noted below in Assessment   Left Arm/Hand/Fingers Normal except as noted below in Assessment   Chest/Breasts/Axillae Viewed areas Normal except as noted below in Assessment   Abdomen, Umbilicus Normal except as noted below in Assessment   Back/Spine Normal except as noted below in Assessment Groin/Genitalia/Buttocks Viewed areas Normal except as noted below in Assessment   Right Leg, Foot, Toes Normal except as noted below in Assessment   Left Leg, Foot, Toes Normal except as noted below in Assessment     HIDRADENITIS SUPPURATIVA    Physical Exam:  Anatomic Location Affected:  Axillae, groin, and buttocks  Morphological Description:  2 cm cyst right groin  Pertinent Positives:  Pertinent Negatives: Additional History of Present Condition:  Patient no longer taking spironolactone 50 mg once daily, and  longer using the clindamycin 1% lotion  Has changed diet and is noticing some improvement  Would like to try a more natural treatment  Patient states no side affects noted from spironolactone     Assessment and Plan:  Based on a thorough discussion of this condition and the management approach to it (including a comprehensive discussion of the known risks, side effects and potential benefits of treatment), the patient (family) agrees to implement the following specific plan:    Discussed starting Zinc 90 mg ( never started since last treatment )   Recommends using clindamycin 1% lotion once or twice daily  Can increase to spironolactone 100 mg once daily  Discussed needing to be on medication for long term ( 6 - 9 months ) to see improvement  I  Noted risk benefit of this antiandrogen  I noted drug interaction and monitoring of potassium, bun , creatinine in several weeks  Follow up in 4 months    What is hidradenitis suppurativa? Hidradenitis suppurativa is an inflammatory skin disease that affects apocrine gland-bearing skin in the axillae, in the groin, and under the breasts  It is characterised by recurrent boil-like nodules and abscesses that culminate in pus-like discharge, difficult-to-heal open wounds (sinuses) and scarring  Hidradenitis suppurativa also has significant psychological impact and many patients suffer from impairment of body image, depression and anxiety       The term hidradenitis implies it starts as an inflammatory disorder of sweat glands, which is now known to be incorrect  Hidradenitis suppurativa is also known as acne inversa  Who gets hidradenitis suppurativa? Hidradenitis often starts at puberty, and is most active between the ages of 21 and 36 years, and in women, can resolve at menopause  It is three times more common in females than in males  Risk factors include: Other family members with hidradenitis suppurativa  Obesity and insulin resistance/metabolic syndrome  Cigarette smoking  Follicular occlusion disorders: acne conglobata, dissecting cellulitis, pilonidal sinus  Inflammatory bowel disease (Crohn disease)  Rare autoinflammatory syndromes associated with abnormalities of PSTPIP1 gene  *    * PAPA syndrome (pyogenic arthritis, pyoderma gangrenosum and acne), PASH syndrome (pyoderma gangrenosum, acne, suppurative hidradenitis) and PAPASH syndrome (pyogenic arthritis, pyoderma gangrenosum, acne, suppurative hidradenitis)  What causes hidradenitis suppurativa? Hidradenitis suppurativa is an autoinflammatory disorder  Although the exact cause is not yet understood, contributing factors include:  Friction from clothing and body folds  Aberrant immune response to commensal bacteria  Abnormal cutaneous or follicular microbiome  Follicular occlusion  Release of pro-inflammatory cytokines  Inflammation causing rupture of the follicular wall and destroying apocrine glands and ducts  Secondary bacterial infection  Certain drugs  What are the clinical features of hidradenitis suppurativa? Hidradenitis can affect a single or multiple areas in the armpits, neck, sub mammary area, and inner thighs  Anogenital involvement most commonly affects the groin, mons pubis, vulva (in females), sides of the scrotum (in males), perineum, buttocks and perianal folds    Signs include:  Open and closed comedones  Painful firm papules, larger nodules and pleated ridges  Pustules, fluctuant pseudocysts and abscesses  Pyogenic granulomas  Draining sinuses linking inflammatory lesions  Hypertrophic and atrophic scars  Many patients with hidradenitis suppurativa also suffer from other skin disorders, including acne, hirsutism and psoriasis  The severity and extent of hidradenitis suppurativa is recorded at assessment and when determining the impact of a treatment  The [employer] system describes three distinct clinical stages:  Solitary or multiple, isolated abscess formation without scarring or sinus tracts  Recurrent abscesses, single or multiple widely  lesions, with sinus tract formation  Diffuse or broad involvement, with multiple interconnected sinus tracts and abscesses  Severe hidradenitis (Stauffer Stage 3) has been associated with:  Male sex  Axillary and perianal involvement  Obesity  Smoking  Higher risk of stroke, coronary artery disease, heart failure, and peripheral artery disease  Disease duration  What is the treatment for hidradenitis suppurativa? General measures  Weight loss; follow an anti-inflammatory, low-sugar, low-grain, low-dairy diet (mainly plants)  Smoking cessation: this can lead to improvement within a few months  Loose fitting clothing  Daily unfragranced antiperspirants  If prone to secondary infection, wash with antiseptics or take bleach baths  Apply hydrogen peroxide solution or medical grade honey to reduce malodour  Use peeling agents such as resorcinol 15% cream to de-roof nodules  Apply simple dressings to draining sinuses  Analgesics, such as paracetamol (acetaminophen), for pain control  Seek help to manage anxiety and depression  Medical management of hidradenitis suppurativa  Medical management of hidradenitis suppurativa is difficult  Treatment is required long term  Effective options are listed below      Antibiotics  Topical clindamycin, with benzoyl peroxide to reduce bacterial resistance  Short course of oral antibiotics for acute staphylococcal abscesses, eg flucloxacillin  Prolonged courses (minimum 3 months) of tetracycline, metronidazole, trimethoprim + sulphamethoxazole, fluoroquinolones, ertapenem or dapsone for their anti-inflammatory action  6-12 week courses of the combination of clindamycin (or doxycycline) and rifampicin for severe disease  Antiandrogens  Long-term oral contraceptive pill; antiandrogenic progesterones drospirenone or cyproterone acetate may be more effective than standard combined pills  These are more suitable than progesterone-only pills or devices  Spironolactone and finasteride  Response takes 6 months or longer  Immunomodulatory treatments for severe disease  Intralesional corticosteroids into nodules  Systemic corticosteroids short-term for flares  Methotrexate, ciclosporin, and azathioprine  TNF-a inhibitors adalimumab and infliximab, used in higher dose than required for psoriasis, are the most successful treatments to date   Note that paradoxically, they may sometimes induce new-onset hidradenitis suppurativa  Other biologics are under investigation, such as the IL-1ß antagonist, canakinumab    Other medical treatments  Metformin in patients with insulin resistance  Acitretin (unsuitable for females of childbearing potential)  Isotretinoin -- effective for acne but appears unhelpful for most cases of hidradenitis  Colchicine  Medical management of anxiety and depression    Surgical management of hidradenitis suppurativa  Incision and drainage of acute abscesses  Curettage and deroofing of nodules, abscesses and sinuses  Laser ablation of nodules, abscesses and sinuses  Wide local excision of persistent nodules  Radical excisional surgery of entire affected area  Nd:YAG laser hair removal    Scribe Attestation    I,:  Quincy Nguyen MA am acting as a scribe while in the presence of the attending physician :       I,:  Brittany Collins MD personally performed the services described in this documentation    as scribed in my presence :

## 2022-08-02 NOTE — PATIENT INSTRUCTIONS
HIDRADENITIS SUPPURATIVA      Assessment and Plan:  Based on a thorough discussion of this condition and the management approach to it (including a comprehensive discussion of the known risks, side effects and potential benefits of treatment), the patient (family) agrees to implement the following specific plan:    Discussed starting Zinc 90 mg ( never started since last treatment )   Recommends using clindamycin 1% lotion once or twice daily  Can increase to spironolactone 100 mg once daily  Discussed needing to be on medication for long term ( 6 - 9 months ) to see improvement  Follow up in 4 months    What is hidradenitis suppurativa? Hidradenitis suppurativa is an inflammatory skin disease that affects apocrine gland-bearing skin in the axillae, in the groin, and under the breasts  It is characterised by recurrent boil-like nodules and abscesses that culminate in pus-like discharge, difficult-to-heal open wounds (sinuses) and scarring  Hidradenitis suppurativa also has significant psychological impact and many patients suffer from impairment of body image, depression and anxiety  The term hidradenitis implies it starts as an inflammatory disorder of sweat glands, which is now known to be incorrect  Hidradenitis suppurativa is also known as acne inversa  Who gets hidradenitis suppurativa? Hidradenitis often starts at puberty, and is most active between the ages of 21 and 36 years, and in women, can resolve at menopause  It is three times more common in females than in males  Risk factors include: Other family members with hidradenitis suppurativa  Obesity and insulin resistance/metabolic syndrome  Cigarette smoking  Follicular occlusion disorders: acne conglobata, dissecting cellulitis, pilonidal sinus  Inflammatory bowel disease (Crohn disease)  Rare autoinflammatory syndromes associated with abnormalities of PSTPIP1 gene  *    * PAPA syndrome (pyogenic arthritis, pyoderma gangrenosum and acne), PAS syndrome (pyoderma gangrenosum, acne, suppurative hidradenitis) and PAPASH syndrome (pyogenic arthritis, pyoderma gangrenosum, acne, suppurative hidradenitis)  What causes hidradenitis suppurativa? Hidradenitis suppurativa is an autoinflammatory disorder  Although the exact cause is not yet understood, contributing factors include:  Friction from clothing and body folds  Aberrant immune response to commensal bacteria  Abnormal cutaneous or follicular microbiome  Follicular occlusion  Release of pro-inflammatory cytokines  Inflammation causing rupture of the follicular wall and destroying apocrine glands and ducts  Secondary bacterial infection  Certain drugs  What are the clinical features of hidradenitis suppurativa? Hidradenitis can affect a single or multiple areas in the armpits, neck, sub mammary area, and inner thighs  Anogenital involvement most commonly affects the groin, mons pubis, vulva (in females), sides of the scrotum (in males), perineum, buttocks and perianal folds  Signs include:  Open and closed comedones  Painful firm papules, larger nodules and pleated ridges  Pustules, fluctuant pseudocysts and abscesses  Pyogenic granulomas  Draining sinuses linking inflammatory lesions  Hypertrophic and atrophic scars  Many patients with hidradenitis suppurativa also suffer from other skin disorders, including acne, hirsutism and psoriasis  The severity and extent of hidradenitis suppurativa is recorded at assessment and when determining the impact of a treatment  The [place] system describes three distinct clinical stages:  Solitary or multiple, isolated abscess formation without scarring or sinus tracts  Recurrent abscesses, single or multiple widely  lesions, with sinus tract formation  Diffuse or broad involvement, with multiple interconnected sinus tracts and abscesses      Severe hidradenitis (Stauffer Stage 3) has been associated with:  Male sex  Axillary and perianal involvement  Obesity  Smoking  Higher risk of stroke, coronary artery disease, heart failure, and peripheral artery disease  Disease duration  What is the treatment for hidradenitis suppurativa? General measures  Weight loss; follow an anti-inflammatory, low-sugar, low-grain, low-dairy diet (mainly plants)  Smoking cessation: this can lead to improvement within a few months  Loose fitting clothing  Daily unfragranced antiperspirants  If prone to secondary infection, wash with antiseptics or take bleach baths  Apply hydrogen peroxide solution or medical grade honey to reduce malodour  Use peeling agents such as resorcinol 15% cream to de-roof nodules  Apply simple dressings to draining sinuses  Analgesics, such as paracetamol (acetaminophen), for pain control  Seek help to manage anxiety and depression  Medical management of hidradenitis suppurativa  Medical management of hidradenitis suppurativa is difficult  Treatment is required long term  Effective options are listed below  Antibiotics  Topical clindamycin, with benzoyl peroxide to reduce bacterial resistance  Short course of oral antibiotics for acute staphylococcal abscesses, eg flucloxacillin  Prolonged courses (minimum 3 months) of tetracycline, metronidazole, trimethoprim + sulphamethoxazole, fluoroquinolones, ertapenem or dapsone for their anti-inflammatory action  6-12 week courses of the combination of clindamycin (or doxycycline) and rifampicin for severe disease  Antiandrogens  Long-term oral contraceptive pill; antiandrogenic progesterones drospirenone or cyproterone acetate may be more effective than standard combined pills  These are more suitable than progesterone-only pills or devices  Spironolactone and finasteride  Response takes 6 months or longer      Immunomodulatory treatments for severe disease  Intralesional corticosteroids into nodules  Systemic corticosteroids short-term for flares  Methotrexate, ciclosporin, and azathioprine  TNF-? inhibitors adalimumab and infliximab, used in higher dose than required for psoriasis, are the most successful treatments to date  Note that paradoxically, they may sometimes induce new-onset hidradenitis suppurativa  Other biologics are under investigation, such as the IL-1?  antagonist, canakinumab    Other medical treatments  Metformin in patients with insulin resistance  Acitretin (unsuitable for females of childbearing potential)  Isotretinoin -- effective for acne but appears unhelpful for most cases of hidradenitis  Colchicine  Medical management of anxiety and depression    Surgical management of hidradenitis suppurativa  Incision and drainage of acute abscesses  Curettage and deroofing of nodules, abscesses and sinuses  Laser ablation of nodules, abscesses and sinuses  Wide local excision of persistent nodules  Radical excisional surgery of entire affected area  Nd:YAG laser hair removal

## 2022-08-12 ENCOUNTER — TELEPHONE (OUTPATIENT)
Dept: SURGERY | Facility: CLINIC | Age: 27
End: 2022-08-12

## 2022-08-12 NOTE — TELEPHONE ENCOUNTER
Patient called states just received a phone call from [facility] regarding his Insurance he states he doesn't know why or what happened, but he called Insurance and they state everything is okay on there part, patient will like to speak with [facility] to find out what her call was in regards to, patient Phone # [phone]

## 2022-08-29 ENCOUNTER — TELEPHONE (OUTPATIENT)
Dept: SURGERY | Facility: CLINIC | Age: 27
End: 2022-08-29

## 2022-08-29 NOTE — TELEPHONE ENCOUNTER
Pt lm inquiring about her surgery date  Pt states she thought she was sched for surgery 9/2 but her mychart says 9/16  I tried to call pt but was unable to lm  FYI     Per our conversation when I sched her, surgeries do not show on mychart   She is sched for 9/2, 9/16 is her post op

## 2022-08-31 ENCOUNTER — LAB (OUTPATIENT)
Dept: LAB | Facility: CLINIC | Age: 27
End: 2022-08-31
Payer: COMMERCIAL

## 2022-08-31 DIAGNOSIS — L73.2 HIDRADENITIS SUPPURATIVA: ICD-10-CM

## 2022-08-31 DIAGNOSIS — L73.2 HIDRADENITIS AXILLARIS: ICD-10-CM

## 2022-08-31 LAB
ALBUMIN SERPL BCP-MCNC: 3.4 G/DL (ref 3.5–5)
ALP SERPL-CCNC: 85 U/L (ref 46–116)
ALT SERPL W P-5'-P-CCNC: 17 U/L (ref 12–78)
ANION GAP SERPL CALCULATED.3IONS-SCNC: 2 MMOL/L (ref 4–13)
AST SERPL W P-5'-P-CCNC: 11 U/L (ref 5–45)
BASOPHILS # BLD AUTO: 0.01 THOUSANDS/ΜL (ref 0–0.1)
BASOPHILS NFR BLD AUTO: 0 % (ref 0–1)
BILIRUB SERPL-MCNC: 0.33 MG/DL (ref 0.2–1)
BUN SERPL-MCNC: 5 MG/DL (ref 5–25)
CALCIUM ALBUM COR SERPL-MCNC: 9.8 MG/DL (ref 8.3–10.1)
CALCIUM SERPL-MCNC: 9.3 MG/DL (ref 8.3–10.1)
CHLORIDE SERPL-SCNC: 108 MMOL/L (ref 96–108)
CO2 SERPL-SCNC: 27 MMOL/L (ref 21–32)
CREAT SERPL-MCNC: 0.95 MG/DL (ref 0.6–1.3)
EOSINOPHIL # BLD AUTO: 0.26 THOUSAND/ΜL (ref 0–0.61)
EOSINOPHIL NFR BLD AUTO: 5 % (ref 0–6)
ERYTHROCYTE [DISTWIDTH] IN BLOOD BY AUTOMATED COUNT: 14.9 % (ref 11.6–15.1)
GFR SERPL CREATININE-BSD FRML MDRD: 83 ML/MIN/1.73SQ M
GLUCOSE SERPL-MCNC: 83 MG/DL (ref 65–140)
HCT VFR BLD AUTO: 40.3 % (ref 34.8–46.1)
HGB BLD-MCNC: 12.3 G/DL (ref 11.5–15.4)
IMM GRANULOCYTES # BLD AUTO: 0.02 THOUSAND/UL (ref 0–0.2)
IMM GRANULOCYTES NFR BLD AUTO: 0 % (ref 0–2)
LYMPHOCYTES # BLD AUTO: 1.88 THOUSANDS/ΜL (ref 0.6–4.47)
LYMPHOCYTES NFR BLD AUTO: 33 % (ref 14–44)
MCH RBC QN AUTO: 25.3 PG (ref 26.8–34.3)
MCHC RBC AUTO-ENTMCNC: 30.5 G/DL (ref 31.4–37.4)
MCV RBC AUTO: 83 FL (ref 82–98)
MONOCYTES # BLD AUTO: 0.41 THOUSAND/ΜL (ref 0.17–1.22)
MONOCYTES NFR BLD AUTO: 7 % (ref 4–12)
NEUTROPHILS # BLD AUTO: 3.21 THOUSANDS/ΜL (ref 1.85–7.62)
NEUTS SEG NFR BLD AUTO: 55 % (ref 43–75)
NRBC BLD AUTO-RTO: 0 /100 WBCS
PLATELET # BLD AUTO: 434 THOUSANDS/UL (ref 149–390)
PMV BLD AUTO: 10.3 FL (ref 8.9–12.7)
POTASSIUM SERPL-SCNC: 3.7 MMOL/L (ref 3.5–5.3)
PROT SERPL-MCNC: 9.1 G/DL (ref 6.4–8.4)
RBC # BLD AUTO: 4.86 MILLION/UL (ref 3.81–5.12)
SODIUM SERPL-SCNC: 137 MMOL/L (ref 135–147)
WBC # BLD AUTO: 5.79 THOUSAND/UL (ref 4.31–10.16)

## 2022-08-31 PROCEDURE — 82525 ASSAY OF COPPER: CPT

## 2022-08-31 PROCEDURE — 36415 COLL VENOUS BLD VENIPUNCTURE: CPT

## 2022-08-31 PROCEDURE — 84403 ASSAY OF TOTAL TESTOSTERONE: CPT

## 2022-08-31 PROCEDURE — 84402 ASSAY OF FREE TESTOSTERONE: CPT

## 2022-08-31 PROCEDURE — 80053 COMPREHEN METABOLIC PANEL: CPT

## 2022-08-31 PROCEDURE — 82627 DEHYDROEPIANDROSTERONE: CPT

## 2022-08-31 PROCEDURE — 85025 COMPLETE CBC W/AUTO DIFF WBC: CPT

## 2022-08-31 NOTE — PRE-PROCEDURE INSTRUCTIONS
Pre-Surgery Instructions:   Medication Instructions    acetaminophen (TYLENOL) 500 mg tablet Uses PRN- OK to take day of surgery    spironolactone (ALDACTONE) 100 mg tablet Hold day of surgery  Spoke with pt via phone  Advised hospital location will call with arrival time night before surgery  NPO after midnight the night before surgery, including chewing gum and hard candy  A small sip of water is allowed to take approved medications the morning of surgery  Non-vaccinated patients and visitors need to remain masked at all times while in the hospital     Instructed to leave contacts/jewelery/valuables at home  Ok to wear dentures, glasses and hearing aides into the hospital - they will be removed for surgery  No smoking or alcohol consumption 24 hours prior to surgery  Avoid all Aspirin products and OTC Vit/Supp  Tylenol ok to take prn  Showering instructions reviewed for night before and morning of surgery using surgical soap or antibacterial soap  Patient verbalized understanding of all of the above, including medication instructions

## 2022-09-01 ENCOUNTER — ANESTHESIA EVENT (OUTPATIENT)
Dept: PERIOP | Facility: HOSPITAL | Age: 27
End: 2022-09-01
Payer: COMMERCIAL

## 2022-09-01 LAB
DHEA-S SERPL-MCNC: 349 UG/DL (ref 84.8–378)
TESTOST FREE SERPL-MCNC: 2.8 PG/ML (ref 0–4.2)
TESTOST SERPL-MCNC: 42 NG/DL (ref 13–71)

## 2022-09-01 NOTE — ANESTHESIA PREPROCEDURE EVALUATION
Procedure:  EXCISION HIDRADENITIS AXILLARY (Right Axilla)    Relevant Problems   No relevant active problems        Physical Exam    Airway    Mallampati score: II  TM Distance: >3 FB  Neck ROM: full     Dental   No notable dental hx     Cardiovascular      Pulmonary      Other Findings        Anesthesia Plan  ASA Score- 2     Anesthesia Type- general with ASA Monitors  Additional Monitors:   Airway Plan: LMA  Plan Factors-Exercise tolerance (METS): >4 METS  Chart reviewed  Existing labs reviewed  Patient summary reviewed  Patient is a current smoker  Patient instructed to abstain from smoking on day of procedure  Patient did not smoke on day of surgery  Induction- intravenous  Postoperative Plan- Plan for postoperative opioid use  Informed Consent- Anesthetic plan and risks discussed with patient  I personally reviewed this patient with the CRNA  Discussed and agreed on the Anesthesia Plan with the CRNA  Gerhardt Sep

## 2022-09-02 ENCOUNTER — ANESTHESIA (OUTPATIENT)
Dept: PERIOP | Facility: HOSPITAL | Age: 27
End: 2022-09-02
Payer: COMMERCIAL

## 2022-09-02 ENCOUNTER — HOSPITAL ENCOUNTER (OUTPATIENT)
Facility: HOSPITAL | Age: 27
Setting detail: OUTPATIENT SURGERY
Discharge: HOME | End: 2022-09-02
Attending: SURGERY | Admitting: SURGERY
Payer: COMMERCIAL

## 2022-09-02 VITALS
WEIGHT: 197.09 LBS | HEART RATE: 66 BPM | HEIGHT: 65 IN | SYSTOLIC BLOOD PRESSURE: 108 MMHG | RESPIRATION RATE: 20 BRPM | BODY MASS INDEX: 32.84 KG/M2 | DIASTOLIC BLOOD PRESSURE: 76 MMHG | OXYGEN SATURATION: 98 % | TEMPERATURE: 98 F

## 2022-09-02 DIAGNOSIS — L73.2 HIDRADENITIS: Primary | ICD-10-CM

## 2022-09-02 DIAGNOSIS — L73.2 HIDRADENITIS AXILLARIS: ICD-10-CM

## 2022-09-02 LAB
COPPER SERPL-MCNC: 178 UG/DL (ref 80–158)
EXT PREGNANCY TEST URINE: NEGATIVE
EXT. CONTROL: NORMAL

## 2022-09-02 PROCEDURE — 11450 EXC SKN HDRDNT AX SMPL/NTRM: CPT | Performed by: SURGERY

## 2022-09-02 PROCEDURE — 81025 URINE PREGNANCY TEST: CPT | Performed by: ANESTHESIOLOGY

## 2022-09-02 PROCEDURE — NC001 PR NO CHARGE: Performed by: SURGERY

## 2022-09-02 PROCEDURE — 88304 TISSUE EXAM BY PATHOLOGIST: CPT | Performed by: PATHOLOGY

## 2022-09-02 RX ORDER — PROPOFOL 10 MG/ML
INJECTION, EMULSION INTRAVENOUS AS NEEDED
Status: DISCONTINUED | OUTPATIENT
Start: 2022-09-02 | End: 2022-09-02

## 2022-09-02 RX ORDER — MAGNESIUM HYDROXIDE 1200 MG/15ML
LIQUID ORAL AS NEEDED
Status: DISCONTINUED | OUTPATIENT
Start: 2022-09-02 | End: 2022-09-02 | Stop reason: HOSPADM

## 2022-09-02 RX ORDER — DOCUSATE SODIUM 100 MG/1
100 CAPSULE, LIQUID FILLED ORAL 2 TIMES DAILY
Qty: 20 CAPSULE | Refills: 0 | Status: SHIPPED | OUTPATIENT
Start: 2022-09-02 | End: 2022-10-25

## 2022-09-02 RX ORDER — CLINDAMYCIN PHOSPHATE 900 MG/50ML
900 INJECTION INTRAVENOUS ONCE
Status: COMPLETED | OUTPATIENT
Start: 2022-09-02 | End: 2022-09-02

## 2022-09-02 RX ORDER — FENTANYL CITRATE 50 UG/ML
INJECTION, SOLUTION INTRAMUSCULAR; INTRAVENOUS AS NEEDED
Status: DISCONTINUED | OUTPATIENT
Start: 2022-09-02 | End: 2022-09-02

## 2022-09-02 RX ORDER — LIDOCAINE HYDROCHLORIDE 10 MG/ML
INJECTION, SOLUTION EPIDURAL; INFILTRATION; INTRACAUDAL; PERINEURAL AS NEEDED
Status: DISCONTINUED | OUTPATIENT
Start: 2022-09-02 | End: 2022-09-02

## 2022-09-02 RX ORDER — FENTANYL CITRATE/PF 50 MCG/ML
25 SYRINGE (ML) INJECTION
Status: DISCONTINUED | OUTPATIENT
Start: 2022-09-02 | End: 2022-09-02 | Stop reason: HOSPADM

## 2022-09-02 RX ORDER — ONDANSETRON 2 MG/ML
4 INJECTION INTRAMUSCULAR; INTRAVENOUS ONCE AS NEEDED
Status: DISCONTINUED | OUTPATIENT
Start: 2022-09-02 | End: 2022-09-02 | Stop reason: HOSPADM

## 2022-09-02 RX ORDER — DEXAMETHASONE SODIUM PHOSPHATE 10 MG/ML
INJECTION, SOLUTION INTRAMUSCULAR; INTRAVENOUS AS NEEDED
Status: DISCONTINUED | OUTPATIENT
Start: 2022-09-02 | End: 2022-09-02

## 2022-09-02 RX ORDER — HYDROCODONE BITARTRATE AND ACETAMINOPHEN 5; 325 MG/1; MG/1
1 TABLET ORAL EVERY 6 HOURS PRN
Qty: 14 TABLET | Refills: 0 | Status: SHIPPED | OUTPATIENT
Start: 2022-09-02 | End: 2022-09-12

## 2022-09-02 RX ORDER — SODIUM CHLORIDE, SODIUM LACTATE, POTASSIUM CHLORIDE, CALCIUM CHLORIDE 600; 310; 30; 20 MG/100ML; MG/100ML; MG/100ML; MG/100ML
125 INJECTION, SOLUTION INTRAVENOUS CONTINUOUS
Status: DISCONTINUED | OUTPATIENT
Start: 2022-09-02 | End: 2022-09-02 | Stop reason: HOSPADM

## 2022-09-02 RX ADMIN — FENTANYL CITRATE 50 MCG: 50 INJECTION, SOLUTION INTRAMUSCULAR; INTRAVENOUS at 07:56

## 2022-09-02 RX ADMIN — CLINDAMYCIN PHOSPHATE 900 MG: 900 INJECTION, SOLUTION INTRAVENOUS at 07:59

## 2022-09-02 RX ADMIN — FENTANYL CITRATE 25 MCG: 50 INJECTION, SOLUTION INTRAMUSCULAR; INTRAVENOUS at 08:13

## 2022-09-02 RX ADMIN — PROPOFOL 150 MG: 10 INJECTION, EMULSION INTRAVENOUS at 07:56

## 2022-09-02 RX ADMIN — LIDOCAINE HYDROCHLORIDE 50 MG: 10 INJECTION, SOLUTION EPIDURAL; INFILTRATION; INTRACAUDAL at 07:56

## 2022-09-02 RX ADMIN — DEXAMETHASONE SODIUM PHOSPHATE 10 MG: 10 INJECTION INTRAMUSCULAR; INTRAVENOUS at 07:56

## 2022-09-02 RX ADMIN — FENTANYL CITRATE 25 MCG: 50 INJECTION, SOLUTION INTRAMUSCULAR; INTRAVENOUS at 08:17

## 2022-09-02 RX ADMIN — SODIUM CHLORIDE, SODIUM LACTATE, POTASSIUM CHLORIDE, AND CALCIUM CHLORIDE: .6; .31; .03; .02 INJECTION, SOLUTION INTRAVENOUS at 07:56

## 2022-09-02 NOTE — OP NOTE
OPERATIVE REPORT  PATIENT NAME: Davey Beltran    :  1995  MRN: [id number]  Pt Location: [facility] ROOM 02    SURGERY DATE: 2022    Surgeon(s) and Role:     * Lucille Rojas MD - Primary    Preop Diagnosis:  Hidradenitis axillaris [L73 2]    Post-Op Diagnosis Codes:     * Hidradenitis axillaris [L73 2]    Procedure(s) (LRB):  EXCISION HIDRADENITIS AXILLARY (Right)    Specimen(s):  ID Type Source Tests Collected by Time Destination   1 : Right Axillary Excision Tissue Axillary TISSUE EXAM Lucille Rojas MD 2022 [id number]        Estimated Blood Loss:   Minimal    Drains:  * No LDAs found *    Anesthesia Type:   General/LMA    Operative Indications:  Hidradenitis axillaris [L73 2]      Operative Findings:  Hidradenitis suppurativa axillaris with two sinus consistent with chronic abscess cavity and scarring    Complications:   None    Procedure and Technique:    The patient was seen in preop holding where the location of the lesion was confirmed and marked  The H&P was updated and the procedure reviewed with the patient  The patient was then brought to the OR and placed in supine position  The patient was sedated and an LMA was placed  The site was prepped and draped in sterile fashion with betadine  A preincision time out was initiated by myself and confirmed the patient, procedure, site/side and any applicable marking as well as preoperative antibiotics  The skin and subcutaneous tissue was then anesthetized with 1% lidocaine and 0 25% marcaine mixed 1:1  A hemostat was used to place through the open areas and identify the sinus  There were two areas - the more lateral area the epidermis/dermis was excised to the level of the subcutaneous tissue and measured 1 5 x 2cm  More medially the larger cavity measured 5 x 2 5 cm after excision of epidermis/dermis to the level of the subcutaneous tissue  Hemostasis was achieved with the bovey and using surgicele   4-0 monocryl was used to reapproximate the areas that easily come together without tension at the apex of the debrided area and the rest was left open  The wound was then covered with guaze and abd/tape        I was present for the entire procedure and A qualified resident physician was not available    Patient Disposition:  PACU  and extubated and stable      SIGNATURE: Edie Cespedes MD  DATE: September 2, 2022  TIME: 8:40 AM

## 2022-09-02 NOTE — PROGRESS NOTES
Assessment/Plan:     1  Hidradenitis axillaris  -     Case request operating room: EXCISION HIDRADENITIS AXILLARY; Standing  -     CBC and differential; Future  -     Basic metabolic panel; Future  -     Case request operating room: EXCISION HIDRADENITIS AXILLARY      Will plan for excision of right axillay hidradenitis  We reviewed the risks not limited to bleeding, infection, recurrence, damage to other structures and I outlined on her skin the likely incision but she understands this will be determined based on the extend of dermal and subdermal disease at the time of surgery  Consent signed  Subjective:      Patient ID: Betsy Ruiz is a 24 y o  female  Triage Notes:    Eren Arhcibald is following up for hidradenitis  She underwent excision of painful inflamed lesions of the buttocks followed by the left axilla  Still feels the buttocks/groin and left axilla is doing ok  She saw dermatology and started on spironolactone and was last seen in May  But the right axilla is becoming more painful and draining constantly and she would like to pursue excision  The following portions of the patient's history were reviewed and updated as appropriate: allergies, current medications, past family history, past medical history, past social history, past surgical history and problem list     Review of Systems   Constitutional: Negative for activity change and appetite change  HENT: Negative for congestion, hearing loss, sore throat and trouble swallowing  Eyes: Negative for discharge and visual disturbance  Respiratory: Negative for cough, chest tightness, shortness of breath and wheezing  Cardiovascular: Negative for chest pain and palpitations  Gastrointestinal: Negative for abdominal pain  Endocrine: Negative for cold intolerance and heat intolerance  Genitourinary: Negative for difficulty urinating  Musculoskeletal: Negative for gait problem  Skin: Positive for wound   Negative for color change and rash  Neurological: Negative for dizziness, speech difficulty and headaches  Psychiatric/Behavioral: Negative for behavioral problems and confusion  The patient is not nervous/anxious  Objective:      /75   Pulse 66   Temp (!) 97 4 °F (36 3 °C) (Oral)   Resp 18   Ht 5' 5" (1 651 m)   Wt 89 4 kg (197 lb 1 5 oz)   LMP 08/20/2022   SpO2 100%   BMI 32 80 kg/m²     Below is the patient's most recent value for Albumin, ALT, AST, BUN, Calcium, Chloride, Cholesterol, CO2, Creatinine, GFR, Glucose, HDL, Hematocrit, Hemoglobin, Hemoglobin A1C, LDL, Magnesium, Phosphorus, Platelets, Potassium, PSA, Sodium, Triglycerides, and WBC  Lab Results   Component Value Date    ALT 17 08/31/2022    AST 11 08/31/2022    BUN 5 08/31/2022    CALCIUM 9 3 08/31/2022     08/31/2022    CO2 27 08/31/2022    CREATININE 0 95 08/31/2022    HCT 40 3 08/31/2022    HGB 12 3 08/31/2022     (H) 08/31/2022    K 3 7 08/31/2022    WBC 5 79 08/31/2022     Note: for a comprehensive list of the patient's lab results, access the Results Review activity  Physical Exam  Vitals and nursing note reviewed  Constitutional:       General: She is not in acute distress  Appearance: She is well-developed  She is not diaphoretic  HENT:      Head: Normocephalic and atraumatic  Eyes:      Pupils: Pupils are equal, round, and reactive to light  Cardiovascular:      Rate and Rhythm: Normal rate and regular rhythm  Pulmonary:      Effort: Pulmonary effort is normal       Breath sounds: Normal breath sounds  Abdominal:      Palpations: Abdomen is soft  Musculoskeletal:         General: Normal range of motion  Cervical back: Normal range of motion and neck supple  Skin:     General: Skin is warm and dry  Comments: In the right axilla there are at least 3 draining sinuses throughout with some induration, no blanching erythema or overt fluctuance       One open area on the left with minimal active drainage   Neurological:      Mental Status: She is alert and oriented to person, place, and time  Psychiatric:         Mood and Affect: Mood normal          Behavior: Behavior normal          Thought Content:  Thought content normal          Judgment: Judgment normal              Procedures

## 2022-09-02 NOTE — ANESTHESIA POSTPROCEDURE EVALUATION
Post-Op Assessment Note    CV Status:  Stable  Pain Score: 0    Pain management: adequate     Mental Status:  Alert and awake   Hydration Status:  Euvolemic   PONV Controlled:  Controlled   Airway Patency:  Patent      Post Op Vitals Reviewed: Yes      Staff: CRNA         No complications documented      BP   113/73   Temp  97 3   Pulse  64   Resp   15   SpO2   98

## 2022-09-02 NOTE — DISCHARGE INSTRUCTIONS
Hidradenitis Suppurativa   WHAT YOU NEED TO KNOW:   Hidradenitis suppurativa (HS) is a chronic (long-term) skin disease that causes your sweat glands or hair follicles to get clogged and inflamed  HS causes red bumps that look like pimples or small boils to develop on your skin  The cause of HS is unknown  It may run in families  Being overweight and smoking worsens signs and symptoms of HS  DISCHARGE INSTRUCTIONS:   Contact your healthcare provider if:   Your symptoms get worse, even with treatment  You have questions or concerns about your condition or care  Medicines: You may need any of the following:  Antibiotics  are used to treat or prevent a bacterial infection  Antibiotics may be used long-term  Antibiotics may be given as a cream or pill  NSAIDs , such as ibuprofen, help decrease swelling, pain, and fever  This medicine is available with or without a doctor's order  NSAIDs can cause stomach bleeding or kidney problems in certain people  If you take blood thinner medicine, always ask your healthcare provider if NSAIDs are safe for you  Always read the medicine label and follow directions  Acetaminophen  decreases pain and fever  It is available without a doctor's order  Ask how much to take and how often to take it  Follow directions  Acetaminophen can cause liver damage if not taken correctly  Other medicines  may be used to treat HS  These may include hormones, acne medicines, steroids, biologic therapy, and medicines that slow your immune system  Take your medicine as directed  Contact your healthcare provider if you think your medicine is not helping or if you have side effects  Tell him of her if you are allergic to any medicine  Keep a list of the medicines, vitamins, and herbs you take  Include the amounts, and when and why you take them  Bring the list or the pill bottles to follow-up visits  Carry your medicine list with you in case of an emergency      Manage HS symptoms and decrease flare-ups:   Apply warm, moist compresses  This may help to decrease pain  Keep the compress on your skin for 10 minutes  Sitz baths may be recommended if your genital or anal area is affected by HS  To do a sitz bath, fill a bathtub with 4 to 6 inches of warm water  You may also use a sitz bath pan that fits inside a toilet bowl  Sit in the sitz bath for 15 minutes  Do this 3 times a day, and after each bowel movement  The warm water can help decrease pain and swelling  Wash your skin gently  Use cleansers recommended by your healthcare provider  Antibacterial soap may be helpful  Lose weight if you are overweight  Weight loss may help to improve signs and symptoms of HS  Do not smoke  Smoking can make it more difficult to treat HS and worsen symptoms  Ask your healthcare provider for information if you currently smoke and need help to quit  E-cigarettes or smokeless tobacco still contain nicotine  Talk to your healthcare provider before you use these products  Do not wear tight clothing  Tight clothing rubs against your skin and causes irritation that can worsen HS  Do not shave or use deodorant in areas of skin affected by HS  Ask your healthcare provider about safe deodorant or hair removal options  Keep your skin cool  Overheating and sweating can cause an HS flare-up  Ask your healthcare provider if you should make any changes to the foods you eat  Your healthcare provider may recommend that you avoid dairy foods  A dairy-free diet may help decrease your symptoms  Dairy foods include milk, cheese, yogurt, and ice cream      Tell your healthcare provider if HS is causing you to feel depressed  Your healthcare provider may recommend counseling to help you cope with HS  Follow up with your doctor as directed:  Write down your questions so you remember to ask them during your visits     © Copyright [employer] 2022 Information is for End User's use only and may not be sold, redistributed or otherwise used for commercial purposes  All illustrations and images included in CareNotes® are the copyrighted property of [employer]  or Nahed Lee  The above information is an  only  It is not intended as medical advice for individual conditions or treatments  Talk to your doctor, nurse or pharmacist before following any medical regimen to see if it is safe and effective for you

## 2022-09-03 NOTE — RESULT ENCOUNTER NOTE
Labs are essentially normal  There is no shayan of significant hormone problem     I note slight elevated copper level is not llikely a a sign of anything  It could refect vitamin supplementation   ( Zinc actually lowers copper level0

## 2022-09-06 DIAGNOSIS — L73.2 HIDRADENITIS SUPPURATIVA: ICD-10-CM

## 2022-09-07 RX ORDER — SPIRONOLACTONE 100 MG/1
TABLET, FILM COATED ORAL
Qty: 90 TABLET | Refills: 2 | Status: SHIPPED | OUTPATIENT
Start: 2022-09-07

## 2022-09-12 PROCEDURE — 88304 TISSUE EXAM BY PATHOLOGIST: CPT | Performed by: PATHOLOGY

## 2022-09-16 ENCOUNTER — OFFICE VISIT (OUTPATIENT)
Dept: SURGERY | Facility: CLINIC | Age: 27
End: 2022-09-16

## 2022-09-16 VITALS
HEIGHT: 65 IN | TEMPERATURE: 98 F | SYSTOLIC BLOOD PRESSURE: 116 MMHG | HEART RATE: 63 BPM | DIASTOLIC BLOOD PRESSURE: 66 MMHG | WEIGHT: 192 LBS | OXYGEN SATURATION: 98 % | RESPIRATION RATE: 16 BRPM | BODY MASS INDEX: 31.99 KG/M2

## 2022-09-16 DIAGNOSIS — L73.2 HIDRADENITIS SUPPURATIVA: ICD-10-CM

## 2022-09-16 DIAGNOSIS — L73.2 HIDRADENITIS AXILLARIS: Primary | ICD-10-CM

## 2022-09-16 DIAGNOSIS — Z09 POSTOP CHECK: ICD-10-CM

## 2022-09-30 ENCOUNTER — OFFICE VISIT (OUTPATIENT)
Dept: SURGERY | Facility: CLINIC | Age: 27
End: 2022-09-30

## 2022-09-30 VITALS
SYSTOLIC BLOOD PRESSURE: 112 MMHG | BODY MASS INDEX: 31.82 KG/M2 | HEART RATE: 74 BPM | WEIGHT: 191 LBS | HEIGHT: 65 IN | DIASTOLIC BLOOD PRESSURE: 74 MMHG

## 2022-09-30 DIAGNOSIS — L73.2 HIDRADENITIS SUPPURATIVA: Primary | ICD-10-CM

## 2022-09-30 RX ORDER — SULFAMETHOXAZOLE AND TRIMETHOPRIM 400; 80 MG/1; MG/1
2 TABLET ORAL EVERY 12 HOURS SCHEDULED
Qty: 10 TABLET | Refills: 0 | Status: SHIPPED | OUTPATIENT
Start: 2022-09-30 | End: 2022-10-05

## 2022-10-08 ENCOUNTER — TELEPHONE (OUTPATIENT)
Dept: OTHER | Facility: OTHER | Age: 27
End: 2022-10-08

## 2022-10-08 NOTE — TELEPHONE ENCOUNTER
Patient needs an appt for a physical  Advised he can go to urgent care but she wants to see office   We do not schedule physicals, so please call back

## 2022-10-25 ENCOUNTER — OFFICE VISIT (OUTPATIENT)
Dept: FAMILY MEDICINE CLINIC | Facility: CLINIC | Age: 27
End: 2022-10-25
Payer: COMMERCIAL

## 2022-10-25 VITALS
OXYGEN SATURATION: 98 % | TEMPERATURE: 96.8 F | HEIGHT: 65 IN | HEART RATE: 72 BPM | DIASTOLIC BLOOD PRESSURE: 86 MMHG | SYSTOLIC BLOOD PRESSURE: 130 MMHG | BODY MASS INDEX: 31.79 KG/M2 | WEIGHT: 190.8 LBS | RESPIRATION RATE: 14 BRPM

## 2022-10-25 DIAGNOSIS — Z11.59 ENCOUNTER FOR HEPATITIS C SCREENING TEST FOR LOW RISK PATIENT: ICD-10-CM

## 2022-10-25 DIAGNOSIS — L73.2 HIDRADENITIS: ICD-10-CM

## 2022-10-25 DIAGNOSIS — Z11.3 ROUTINE SCREENING FOR STI (SEXUALLY TRANSMITTED INFECTION): ICD-10-CM

## 2022-10-25 DIAGNOSIS — Z23 ENCOUNTER FOR IMMUNIZATION: Primary | ICD-10-CM

## 2022-10-25 DIAGNOSIS — G89.29 CHRONIC PAIN OF LEFT KNEE: ICD-10-CM

## 2022-10-25 DIAGNOSIS — E78.1 HYPERTRIGLYCERIDEMIA: ICD-10-CM

## 2022-10-25 DIAGNOSIS — M25.562 CHRONIC PAIN OF LEFT KNEE: ICD-10-CM

## 2022-10-25 PROCEDURE — 87491 CHLMYD TRACH DNA AMP PROBE: CPT | Performed by: FAMILY MEDICINE

## 2022-10-25 PROCEDURE — 99204 OFFICE O/P NEW MOD 45 MIN: CPT | Performed by: FAMILY MEDICINE

## 2022-10-25 PROCEDURE — 90471 IMMUNIZATION ADMIN: CPT

## 2022-10-25 PROCEDURE — 90686 IIV4 VACC NO PRSV 0.5 ML IM: CPT

## 2022-10-25 PROCEDURE — 87591 N.GONORRHOEAE DNA AMP PROB: CPT | Performed by: FAMILY MEDICINE

## 2022-10-25 NOTE — PROGRESS NOTES
Name: Juan Jose Ag      : 1995      MRN: [id number]  Encounter Provider: Stiven Gonzalez MD  Encounter Date: 10/25/2022   Encounter department: [facility]     1  Encounter for immunization  -     influenza vaccine, quadrivalent, 0 5 mL, preservative-free, for adult and pediatric patients 6 mos+ ([facility], [address], [address], [address])    2  Encounter for hepatitis C screening test for low risk patient  -     Hepatitis C antibody; Future    3  Chronic pain of left knee  Assessment & Plan:  Intermittent/chronic left knee pain after an injury in high school  No MRI or xrays in the chart   Increased mobility of the patella   Xray ordered   Referred to ortho for further evaluation   Continue OTC NSAID/ APAP for pain    Orders:  -     XR knee 4+ vw left injury; Future; Expected date: 10/25/2022  -     Ambulatory Referral to Orthopedic Surgery; Future    4  Routine screening for STI (sexually transmitted infection)  Assessment & Plan:  Screening requested and ordered     Orders:  -     HIV 1/2 Antigen/Antibody (4th Generation) w Reflex [facility]; Future  -     Chlamydia/GC amplified DNA by PCR; Future  -     Chlamydia/GC amplified DNA by PCR    5  Hypertriglyceridemia  Assessment & Plan:  Triglyceride 271 in 2020   Will repeat   Low cholesterol diet advised     Orders:  -     Lipid panel; Future    6  Hidradenitis  Assessment & Plan:  Chronic   Underwent several wound debridements and rounds of antibiotics   Dermatology hs recommended an immunosuppressive agent  Reservations with starting treatment   Did review the s/e of humira   Will discuss further with derm             Depression Screening and Follow-up Plan: Patient was screened for depression during today's encounter  They screened negative with a PHQ-2 score of 0  Subjective      Here as an new patient to evaluate left knee pain  Last PCP was 6 years ago  Does follow with gynecology and dermatology    Patient reports left knee pain that dates back to the 10th grade  States she was playing basketball when she injured her left knee  She went to the ER was told there are no acute injuries  She has done physical therapy multiple times and the pain would go away for months- years before it returns  Pt has not seen orthopedics nor has she had an MRI  Patient continues to experience occasional left knee pain especially when playing basketball  Pain mostly located in the medial compartment  The symptoms are worse with running, or jumping  The knee has given out or felt unstable  The patient can bend and straighten the knee fully  Treatment to date has been ice, NSAID's, and therapy with significant relief  Patient wants to make sure there aren't any internal derangements  UTD for pap  History of HPV but tested negaitve on the last pap   Follows with dermatology to address hidradenitis suppurative  States she has tried several different treatment and underwent multiple wound debridements in the past   Dermatology has recommended humira but has some reservations  Review of Systems   Respiratory: Negative  Cardiovascular: Negative  Musculoskeletal: Positive for arthralgias (left knee pain ) and joint swelling  Current Outpatient Medications on File Prior to Visit   Medication Sig   • spironolactone (ALDACTONE) 100 mg tablet TAKE 1 TABLET BY MOUTH EVERY DAY       Objective     /86 (BP Location: Left arm, Patient Position: Sitting, Cuff Size: Standard)   Pulse 72   Temp (!) 96 8 °F (36 °C) (Tympanic)   Resp 14   Ht 5' 5" (1 651 m)   Wt 86 5 kg (190 lb 12 8 oz)   SpO2 98%   BMI 31 75 kg/m²     Physical Exam  Vitals reviewed  Constitutional:       General: She is not in acute distress  Appearance: Normal appearance  She is not ill-appearing  HENT:      Head: Normocephalic and atraumatic  Right Ear: Tympanic membrane normal  There is impacted cerumen (partially impacted)        Left Ear: There is impacted cerumen (partially impacted )  Eyes:      Extraocular Movements: Extraocular movements intact  Cardiovascular:      Rate and Rhythm: Normal rate and regular rhythm  Heart sounds: No murmur heard  Pulmonary:      Effort: Pulmonary effort is normal       Breath sounds: Normal breath sounds  No stridor  No wheezing, rhonchi or rales  Abdominal:      General: There is no distension  Palpations: Abdomen is soft  Tenderness: There is no abdominal tenderness  There is no guarding  Musculoskeletal:      Left knee: Effusion present  No erythema, bony tenderness or crepitus  Normal range of motion  Tenderness present over the medial joint line  No lateral joint line or patellar tendon tenderness  No LCL laxity, MCL laxity, ACL laxity or PCL laxity  Abnormal patellar mobility  Instability Tests: Medial Maurizio test negative and lateral Maurizio test negative  Right lower leg: No edema  Left lower leg: No edema  Skin:     General: Skin is warm  Neurological:      Mental Status: She is alert and oriented to person, place, and time     Psychiatric:         Mood and Affect: Mood normal          Behavior: Behavior normal        Sandra De La Torre MD

## 2022-10-26 PROBLEM — T14.8XXA ABRASION: Status: RESOLVED | Noted: 2018-11-13 | Resolved: 2022-10-26

## 2022-10-26 PROBLEM — E78.1 HYPERTRIGLYCERIDEMIA: Status: ACTIVE | Noted: 2022-10-26

## 2022-10-26 PROBLEM — G89.29 CHRONIC PAIN OF LEFT KNEE: Status: ACTIVE | Noted: 2022-10-26

## 2022-10-26 PROBLEM — Z11.3 ROUTINE SCREENING FOR STI (SEXUALLY TRANSMITTED INFECTION): Status: ACTIVE | Noted: 2022-10-26

## 2022-10-26 PROBLEM — M25.562 CHRONIC PAIN OF LEFT KNEE: Status: ACTIVE | Noted: 2022-10-26

## 2022-10-26 PROBLEM — Z11.59 ENCOUNTER FOR HEPATITIS C SCREENING TEST FOR LOW RISK PATIENT: Status: ACTIVE | Noted: 2022-10-26

## 2022-10-26 LAB
C TRACH DNA SPEC QL NAA+PROBE: NEGATIVE
N GONORRHOEA DNA SPEC QL NAA+PROBE: NEGATIVE

## 2022-10-26 NOTE — ASSESSMENT & PLAN NOTE
Intermittent/chronic left knee pain after an injury in high school  No MRI or xrays in the chart   Increased mobility of the patella   Xray ordered   Referred to ortho for further evaluation   Continue OTC NSAID/ APAP for pain

## 2022-10-26 NOTE — ASSESSMENT & PLAN NOTE
Chronic   Underwent several wound debridements and rounds of antibiotics   Dermatology hs recommended an immunosuppressive agent  Reservations with starting treatment   Did review the s/e of humira   Will discuss further with derm

## 2022-11-07 NOTE — PROGRESS NOTES
Assessment/Plan:    Pathology Results:       1  Hidradenitis suppurativa  -     sulfamethoxazole-trimethoprim (BACTRIM) 400-80 mg per tablet; Take 2 tablets by mouth every 12 (twelve) hours for 5 days      Buttock and perineal lesions with spontaneous drainage  Complete 5 day course of abx  Return in 1 - 2 weeks if symptoms worsen/fail to resolve  Subjective:      Patient ID: Leita Cooks is a 24 y o  female  Triage Notes:    Jass Ibarra is following up  She has hidradenitis of the b/l axillae and perineum  Feels the underarms are doing fine but last night developed a large perineal boil that was made it painful to move/wear undergarments etc  She thinks it may have spontaneously drained but is still quite tender  The following portions of the patient's history were reviewed and updated as appropriate: allergies, current medications, past family history, past medical history, past social history, past surgical history and problem list     Review of Systems   Constitutional: Negative for activity change and appetite change  HENT: Negative for congestion, hearing loss, sore throat and trouble swallowing  Eyes: Negative for discharge and visual disturbance  Respiratory: Negative for cough, chest tightness, shortness of breath and wheezing  Cardiovascular: Negative for chest pain and palpitations  Gastrointestinal: Negative for abdominal pain  Endocrine: Negative for cold intolerance and heat intolerance  Genitourinary: Negative for difficulty urinating  Musculoskeletal: Negative for gait problem  Skin: Positive for wound  Negative for color change and rash  Neurological: Negative for dizziness, speech difficulty and headaches  Psychiatric/Behavioral: Negative for behavioral problems and confusion  The patient is not nervous/anxious            Objective:      /74   Pulse 74   Ht 5' 5" (1 651 m)   Wt 86 6 kg (191 lb)   BMI 31 78 kg/m²     Below is the patient's most recent value for Albumin, ALT, AST, BUN, Calcium, Chloride, Cholesterol, CO2, Creatinine, GFR, Glucose, HDL, Hematocrit, Hemoglobin, Hemoglobin A1C, LDL, Magnesium, Phosphorus, Platelets, Potassium, PSA, Sodium, Triglycerides, and WBC  Lab Results   Component Value Date    ALT 17 08/31/2022    AST 11 08/31/2022    BUN 5 08/31/2022    CALCIUM 9 3 08/31/2022     08/31/2022    CO2 27 08/31/2022    CREATININE 0 95 08/31/2022    HCT 40 3 08/31/2022    HGB 12 3 08/31/2022     (H) 08/31/2022    K 3 7 08/31/2022    WBC 5 79 08/31/2022     Note: for a comprehensive list of the patient's lab results, access the Results Review activity  Physical Exam  Vitals and nursing note reviewed  Constitutional:       General: She is not in acute distress  Appearance: She is well-developed  She is not diaphoretic  HENT:      Head: Normocephalic and atraumatic  Eyes:      Pupils: Pupils are equal, round, and reactive to light  Cardiovascular:      Rate and Rhythm: Normal rate and regular rhythm  Pulmonary:      Effort: Pulmonary effort is normal       Breath sounds: Normal breath sounds  Abdominal:      Palpations: Abdomen is soft  Musculoskeletal:         General: Normal range of motion  Cervical back: Normal range of motion and neck supple  Skin:     General: Skin is warm and dry  Comments: In the right axilla the open wound healing well  Two open furuncles  Spontaneously draining  Very ttp  No surrounding erythema    Neurological:      Mental Status: She is alert and oriented to person, place, and time  Psychiatric:         Mood and Affect: Mood normal          Behavior: Behavior normal          Thought Content:  Thought content normal          Judgment: Judgment normal              Procedures

## 2022-11-07 NOTE — PROGRESS NOTES
Assessment/Plan:    Pathology Results:  Final Diagnosis   A  Hidradenitis, Right Axillary Excision:  -Benign skin and subcutaneous tissue with moderate acute and chronic inflammation involving the adnexal glands  with surface ulceration, consistent with hidradenitis suppurativa   -No evidence of malignancy  1  Hidradenitis axillaris    2  Hidradenitis suppurativa    3  Postop check      Keep area clean, dry and covered with gauze daily  Avoid deodorant  F/u in 2 weeks for recheck  Subjective:      Patient ID: Elsie Keys is a 24 y o  female  Triage Notes:    Effie Marrero is following up after excision of R axillary hidradenitis  She reports doing relatively well  Minimal discomfort - improving  No fevers/chills  Has returned to work  Is keeping the area covered  The following portions of the patient's history were reviewed and updated as appropriate: allergies, current medications, past family history, past medical history, past social history, past surgical history and problem list     Review of Systems      Objective:      /66   Pulse 63   Temp 98 °F (36 7 °C) (Temporal)   Resp 16   Ht 5' 5" (1 651 m)   Wt 87 1 kg (192 lb)   LMP 08/20/2022   SpO2 98%   BMI 31 95 kg/m²     Below is the patient's most recent value for Albumin, ALT, AST, BUN, Calcium, Chloride, Cholesterol, CO2, Creatinine, GFR, Glucose, HDL, Hematocrit, Hemoglobin, Hemoglobin A1C, LDL, Magnesium, Phosphorus, Platelets, Potassium, PSA, Sodium, Triglycerides, and WBC  Lab Results   Component Value Date    ALT 17 08/31/2022    AST 11 08/31/2022    BUN 5 08/31/2022    CALCIUM 9 3 08/31/2022     08/31/2022    CO2 27 08/31/2022    CREATININE 0 95 08/31/2022    HCT 40 3 08/31/2022    HGB 12 3 08/31/2022     (H) 08/31/2022    K 3 7 08/31/2022    WBC 5 79 08/31/2022     Note: for a comprehensive list of the patient's lab results, access the Results Review activity       Physical Exam  Vitals and nursing note reviewed  Constitutional:       General: She is not in acute distress  Appearance: She is well-developed  She is not diaphoretic  HENT:      Head: Normocephalic and atraumatic  Eyes:      Pupils: Pupils are equal, round, and reactive to light  Cardiovascular:      Rate and Rhythm: Normal rate and regular rhythm  Pulmonary:      Effort: Pulmonary effort is normal       Breath sounds: Normal breath sounds  Abdominal:      Palpations: Abdomen is soft  Musculoskeletal:         General: Normal range of motion  Cervical back: Normal range of motion and neck supple  Skin:     General: Skin is warm and dry  Comments: In the right axilla the open wound is granulating/angel  Neurological:      Mental Status: She is alert and oriented to person, place, and time  Psychiatric:         Mood and Affect: Mood normal          Behavior: Behavior normal          Thought Content:  Thought content normal          Judgment: Judgment normal              Procedures

## 2022-11-29 ENCOUNTER — PATIENT MESSAGE (OUTPATIENT)
Dept: FAMILY MEDICINE CLINIC | Facility: CLINIC | Age: 27
End: 2022-11-29

## 2022-12-06 ENCOUNTER — OFFICE VISIT (OUTPATIENT)
Dept: DERMATOLOGY | Facility: CLINIC | Age: 27
End: 2022-12-06

## 2022-12-06 ENCOUNTER — PATIENT MESSAGE (OUTPATIENT)
Dept: FAMILY MEDICINE CLINIC | Facility: CLINIC | Age: 27
End: 2022-12-06

## 2022-12-06 VITALS — HEIGHT: 65 IN | TEMPERATURE: 98 F | WEIGHT: 188 LBS | BODY MASS INDEX: 31.32 KG/M2

## 2022-12-06 DIAGNOSIS — L73.2 HIDRADENITIS SUPPURATIVA: Primary | ICD-10-CM

## 2022-12-06 DIAGNOSIS — Z79.899 HIGH RISK MEDICATION USE: ICD-10-CM

## 2022-12-06 RX ORDER — CLINDAMYCIN PHOSPHATE 10 UG/ML
LOTION TOPICAL
Qty: 60 ML | Refills: 6 | Status: SHIPPED | OUTPATIENT
Start: 2022-12-06

## 2022-12-06 NOTE — PROGRESS NOTES
[facility] Note     Patient Name: King Rita  Encounter Date: 12/6/2022     Have you been cared for by a [facility] Dermatologist in the last 3 years and, if so, which description applies to you? Yes  I have been here within the last 3 years, and my medical history has NOT changed since that time  I am FEMALE/of child-bearing potential     REVIEW OF SYSTEMS:  Have you recently had or currently have any of the following? · No changes in my recent health  PAST MEDICAL HISTORY:  Have you personally ever had or currently have any of the following? If "YES," then please provide more detail  · No changes in my medical history  FAMILY HISTORY:  Any "first degree relatives" (parent, brother, sister, or child) with the following? • No changes in my family's known health  PATIENT EXPERIENCE:    • Do you want the Dermatologist to perform a COMPLETE skin exam today including a clinical examination under the "bra and underwear" areas? NO  • If necessary, do we have your permission to call and leave a detailed message on your Preferred Phone number that includes your specific medical information? Yes      Allergies   Allergen Reactions   • Oxycodone-Acetaminophen Drowsiness   • Percolone [Oxycodone] GI Intolerance      Current Outpatient Medications:   •  spironolactone (ALDACTONE) 100 mg tablet, TAKE 1 TABLET BY MOUTH EVERY DAY (Patient not taking: Reported on 12/6/2022), Disp: 90 tablet, Rfl: 2          • Whom besides the patient is providing clinical information about today's encounter?   o NO ADDITIONAL HISTORIAN (patient alone provided history)    Physical Exam and Assessment/Plan by Diagnosis:    1   HIDRADENITIS SUPPURATIVA    Physical Exam:  • Anatomic Location Affected:  Axillae, bilateral inguinal folds and buttock area  • Morphological Description:  Erythematous nodules with draining tracks and extensive post-surgical and sinus tract scarring    Additional History of Present Condition:  Present for 7 years  Patient had to stop spironolactone 100 mg, it was making her dizzy  Patient is using clindamycin lotion a couple times a week  Patient did take zinc for a little , but then stopped  Did not notice any improvement  Patient took spironolactone 50 mg for a few months with no side effects  Patient had 3 surgeries this year    Assessment and Plan:  Based on a thorough discussion of this condition and the management approach to it (including a comprehensive discussion of the known risks, side effects and potential benefits of treatment), the patient (family) agrees to implement the following specific plan:  • Check blood work including quantiferon gold and chronic hepatitis panel  • Recommend getting the shingles vaccine at family doctor  • Begin PA for Humira given extensive Stauffer stage 3 disease requiring multiple surgical excisions  Currently presenting with ongoing disease with new lesions and draining sinus tracts  • I reviewed risk benefit profile of Humira  What is hidradenitis suppurativa? Hidradenitis suppurativa is an inflammatory skin disease that affects apocrine gland-bearing skin in the axillae, in the groin, and under the breasts  It is characterised by recurrent boil-like nodules and abscesses that culminate in pus-like discharge, difficult-to-heal open wounds (sinuses) and scarring  Hidradenitis suppurativa also has significant psychological impact and many patients suffer from impairment of body image, depression and anxiety  The term hidradenitis implies it starts as an inflammatory disorder of sweat glands, which is now known to be incorrect  Hidradenitis suppurativa is also known as acne inversa  Who gets hidradenitis suppurativa? Hidradenitis often starts at puberty, and is most active between the ages of 21 and 36 years, and in women, can resolve at menopause  It is three times more common in females than in males   Risk factors include:  • Other family members with hidradenitis suppurativa  • Obesity and insulin resistance/metabolic syndrome  • Cigarette smoking  • Follicular occlusion disorders: acne conglobata, dissecting cellulitis, pilonidal sinus  • Inflammatory bowel disease (Crohn disease)  • Rare autoinflammatory syndromes associated with abnormalities of PSTPIP1 gene  *    * PAPA syndrome (pyogenic arthritis, pyoderma gangrenosum and acne), PASH syndrome (pyoderma gangrenosum, acne, suppurative hidradenitis) and PAPASH syndrome (pyogenic arthritis, pyoderma gangrenosum, acne, suppurative hidradenitis)  What causes hidradenitis suppurativa? Hidradenitis suppurativa is an autoinflammatory disorder  Although the exact cause is not yet understood, contributing factors include:  • Friction from clothing and body folds  • Aberrant immune response to commensal bacteria  • Abnormal cutaneous or follicular microbiome  • Follicular occlusion  • Release of pro-inflammatory cytokines  • Inflammation causing rupture of the follicular wall and destroying apocrine glands and ducts  • Secondary bacterial infection  • Certain drugs  What are the clinical features of hidradenitis suppurativa? Hidradenitis can affect a single or multiple areas in the armpits, neck, sub mammary area, and inner thighs  Anogenital involvement most commonly affects the groin, mons pubis, vulva (in females), sides of the scrotum (in males), perineum, buttocks and perianal folds  Signs include:  • Open and closed comedones  • Painful firm papules, larger nodules and pleated ridges  • Pustules, fluctuant pseudocysts and abscesses  • Pyogenic granulomas  • Draining sinuses linking inflammatory lesions  • Hypertrophic and atrophic scars  Many patients with hidradenitis suppurativa also suffer from other skin disorders, including acne, hirsutism and psoriasis  The severity and extent of hidradenitis suppurativa is recorded at assessment and when determining the impact of a treatment  The [place] system describes three distinct clinical stages:  1  Solitary or multiple, isolated abscess formation without scarring or sinus tracts  2  Recurrent abscesses, single or multiple widely  lesions, with sinus tract formation  3  Diffuse or broad involvement, with multiple interconnected sinus tracts and abscesses  Severe hidradenitis (Stauffer Stage 3) has been associated with:  • Male sex  • Axillary and perianal involvement  • Obesity  • Smoking  • Higher risk of stroke, coronary artery disease, heart failure, and peripheral artery disease  • Disease duration  What is the treatment for hidradenitis suppurativa? General measures  • Weight loss; follow an anti-inflammatory, low-sugar, low-grain, low-dairy diet (mainly plants)  • Smoking cessation: this can lead to improvement within a few months  • Loose fitting clothing  • Daily unfragranced antiperspirants  • If prone to secondary infection, wash with antiseptics or take bleach baths  • Apply hydrogen peroxide solution or medical grade honey to reduce malodour  • Use peeling agents such as resorcinol 15% cream to de-roof nodules  • Apply simple dressings to draining sinuses  • Analgesics, such as paracetamol (acetaminophen), for pain control  • Seek help to manage anxiety and depression  Medical management of hidradenitis suppurativa  Medical management of hidradenitis suppurativa is difficult  Treatment is required long term  Effective options are listed below      Antibiotics  • Topical clindamycin, with benzoyl peroxide to reduce bacterial resistance  • Short course of oral antibiotics for acute staphylococcal abscesses, eg flucloxacillin  • Prolonged courses (minimum 3 months) of tetracycline, metronidazole, trimethoprim + sulphamethoxazole, fluoroquinolones, ertapenem or dapsone for their anti-inflammatory action  • 6-12 week courses of the combination of clindamycin (or doxycycline) and rifampicin for severe disease  Antiandrogens  • Long-term oral contraceptive pill; antiandrogenic progesterones drospirenone or cyproterone acetate may be more effective than standard combined pills  These are more suitable than progesterone-only pills or devices  • Spironolactone and finasteride  • Response takes 6 months or longer  Immunomodulatory treatments for severe disease  • Intralesional corticosteroids into nodules  • Systemic corticosteroids short-term for flares  • Methotrexate, ciclosporin, and azathioprine  • TNF-? inhibitors adalimumab and infliximab, used in higher dose than required for psoriasis, are the most successful treatments to date  Note that paradoxically, they may sometimes induce new-onset hidradenitis suppurativa  • Other biologics are under investigation, such as the IL-1?  antagonist, canakinumab    Other medical treatments  • Metformin in patients with insulin resistance  • Acitretin (unsuitable for females of childbearing potential)  • Isotretinoin -- effective for acne but appears unhelpful for most cases of hidradenitis  • Colchicine  • Medical management of anxiety and depression    Surgical management of hidradenitis suppurativa  • Incision and drainage of acute abscesses  • Curettage and deroofing of nodules, abscesses and sinuses  • Laser ablation of nodules, abscesses and sinuses  • Wide local excision of persistent nodules  • Radical excisional surgery of entire affected area  • Nd:YAG laser hair removal    Scribe Attestation    I,:  Jaspal Christina MA am acting as a scribe while in the presence of the attending physician :       I,:  Tank Reardon MD personally performed the services described in this documentation    as scribed in my presence :

## 2022-12-06 NOTE — PATIENT INSTRUCTIONS
1  HIDRADENITIS SUPPURATIVA    Physical Exam:  Anatomic Location Affected:  Axilla, groin and buttock    Assessment and Plan:  Based on a thorough discussion of this condition and the management approach to it (including a comprehensive discussion of the known risks, side effects and potential benefits of treatment), the patient (family) agrees to implement the following specific plan:  Check blood work  Recommend getting the shingles vaccine at [facility] doctor  Begin PA for Humira    What is hidradenitis suppurativa? Hidradenitis suppurativa is an inflammatory skin disease that affects apocrine gland-bearing skin in the axillae, in the groin, and under the breasts  It is characterised by recurrent boil-like nodules and abscesses that culminate in pus-like discharge, difficult-to-heal open wounds (sinuses) and scarring  Hidradenitis suppurativa also has significant psychological impact and many patients suffer from impairment of body image, depression and anxiety  The term hidradenitis implies it starts as an inflammatory disorder of sweat glands, which is now known to be incorrect  Hidradenitis suppurativa is also known as acne inversa  Who gets hidradenitis suppurativa? Hidradenitis often starts at puberty, and is most active between the ages of 21 and 36 years, and in women, can resolve at menopause  It is three times more common in females than in males  Risk factors include: Other family members with hidradenitis suppurativa  Obesity and insulin resistance/metabolic syndrome  Cigarette smoking  Follicular occlusion disorders: acne conglobata, dissecting cellulitis, pilonidal sinus  Inflammatory bowel disease (Crohn disease)  Rare autoinflammatory syndromes associated with abnormalities of PSTPIP1 gene  *    * PAPA syndrome (pyogenic arthritis, pyoderma gangrenosum and acne), PASH syndrome (pyoderma gangrenosum, acne, suppurative hidradenitis) and PAPASH syndrome (pyogenic arthritis, pyoderma gangrenosum, acne, suppurative hidradenitis)  What causes hidradenitis suppurativa? Hidradenitis suppurativa is an autoinflammatory disorder  Although the exact cause is not yet understood, contributing factors include:  Friction from clothing and body folds  Aberrant immune response to commensal bacteria  Abnormal cutaneous or follicular microbiome  Follicular occlusion  Release of pro-inflammatory cytokines  Inflammation causing rupture of the follicular wall and destroying apocrine glands and ducts  Secondary bacterial infection  Certain drugs  What are the clinical features of hidradenitis suppurativa? Hidradenitis can affect a single or multiple areas in the armpits, neck, sub mammary area, and inner thighs  Anogenital involvement most commonly affects the groin, mons pubis, vulva (in females), sides of the scrotum (in males), perineum, buttocks and perianal folds  Signs include:  Open and closed comedones  Painful firm papules, larger nodules and pleated ridges  Pustules, fluctuant pseudocysts and abscesses  Pyogenic granulomas  Draining sinuses linking inflammatory lesions  Hypertrophic and atrophic scars  Many patients with hidradenitis suppurativa also suffer from other skin disorders, including acne, hirsutism and psoriasis  The severity and extent of hidradenitis suppurativa is recorded at assessment and when determining the impact of a treatment  The [place] system describes three distinct clinical stages:  Solitary or multiple, isolated abscess formation without scarring or sinus tracts  Recurrent abscesses, single or multiple widely  lesions, with sinus tract formation  Diffuse or broad involvement, with multiple interconnected sinus tracts and abscesses      Severe hidradenitis (Stauffer Stage 3) has been associated with:  Male sex  Axillary and perianal involvement  Obesity  Smoking  Higher risk of stroke, coronary artery disease, heart failure, and peripheral artery disease  Disease duration  What is the treatment for hidradenitis suppurativa? General measures  Weight loss; follow an anti-inflammatory, low-sugar, low-grain, low-dairy diet (mainly plants)  Smoking cessation: this can lead to improvement within a few months  Loose fitting clothing  Daily unfragranced antiperspirants  If prone to secondary infection, wash with antiseptics or take bleach baths  Apply hydrogen peroxide solution or medical grade honey to reduce malodour  Use peeling agents such as resorcinol 15% cream to de-roof nodules  Apply simple dressings to draining sinuses  Analgesics, such as paracetamol (acetaminophen), for pain control  Seek help to manage anxiety and depression  Medical management of hidradenitis suppurativa  Medical management of hidradenitis suppurativa is difficult  Treatment is required long term  Effective options are listed below  Antibiotics  Topical clindamycin, with benzoyl peroxide to reduce bacterial resistance  Short course of oral antibiotics for acute staphylococcal abscesses, eg flucloxacillin  Prolonged courses (minimum 3 months) of tetracycline, metronidazole, trimethoprim + sulphamethoxazole, fluoroquinolones, ertapenem or dapsone for their anti-inflammatory action  6-12 week courses of the combination of clindamycin (or doxycycline) and rifampicin for severe disease  Antiandrogens  Long-term oral contraceptive pill; antiandrogenic progesterones drospirenone or cyproterone acetate may be more effective than standard combined pills  These are more suitable than progesterone-only pills or devices  Spironolactone and finasteride  Response takes 6 months or longer  Immunomodulatory treatments for severe disease  Intralesional corticosteroids into nodules  Systemic corticosteroids short-term for flares  Methotrexate, ciclosporin, and azathioprine  TNF-?  inhibitors adalimumab and infliximab, used in higher dose than required for psoriasis, are the most successful treatments to date  Note that paradoxically, they may sometimes induce new-onset hidradenitis suppurativa  Other biologics are under investigation, such as the IL-1?  antagonist, canakinumab    Other medical treatments  Metformin in patients with insulin resistance  Acitretin (unsuitable for females of childbearing potential)  Isotretinoin -- effective for acne but appears unhelpful for most cases of hidradenitis  Colchicine  Medical management of anxiety and depression    Surgical management of hidradenitis suppurativa  Incision and drainage of acute abscesses  Curettage and deroofing of nodules, abscesses and sinuses  Laser ablation of nodules, abscesses and sinuses  Wide local excision of persistent nodules  Radical excisional surgery of entire affected area  Nd:YAG laser hair removal

## 2022-12-07 ENCOUNTER — PATIENT MESSAGE (OUTPATIENT)
Dept: FAMILY MEDICINE CLINIC | Facility: CLINIC | Age: 27
End: 2022-12-07

## 2022-12-07 DIAGNOSIS — M25.562 CHRONIC PAIN OF LEFT KNEE: Primary | ICD-10-CM

## 2022-12-07 DIAGNOSIS — G89.29 CHRONIC PAIN OF LEFT KNEE: Primary | ICD-10-CM

## 2022-12-16 LAB
CHOLEST SERPL-MCNC: 120 MG/DL
CHOLEST/HDLC SERPL: 2.7 (CALC)
HCV AB S/CO SERPL IA: 0.28
HCV AB SERPL QL IA: NORMAL
HDLC SERPL-MCNC: 45 MG/DL
HIV 1+2 AB+HIV1 P24 AG SERPL QL IA: NORMAL
LDLC SERPL CALC-MCNC: 57 MG/DL (CALC)
NONHDLC SERPL-MCNC: 75 MG/DL (CALC)
TRIGL SERPL-MCNC: 94 MG/DL

## 2022-12-19 ENCOUNTER — OFFICE VISIT (OUTPATIENT)
Dept: FAMILY MEDICINE CLINIC | Facility: CLINIC | Age: 27
End: 2022-12-19

## 2022-12-19 VITALS
WEIGHT: 184.8 LBS | HEART RATE: 76 BPM | TEMPERATURE: 98.2 F | OXYGEN SATURATION: 100 % | SYSTOLIC BLOOD PRESSURE: 112 MMHG | BODY MASS INDEX: 31.55 KG/M2 | RESPIRATION RATE: 16 BRPM | DIASTOLIC BLOOD PRESSURE: 82 MMHG | HEIGHT: 64 IN

## 2022-12-19 DIAGNOSIS — Z23 ENCOUNTER FOR IMMUNIZATION: ICD-10-CM

## 2022-12-19 DIAGNOSIS — Z00.00 ENCOUNTER FOR ANNUAL PHYSICAL EXAM: Primary | ICD-10-CM

## 2022-12-19 DIAGNOSIS — H91.93 DECREASED HEARING OF BOTH EARS: ICD-10-CM
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
## 2022-12-25 PROBLEM — Z11.3 ROUTINE SCREENING FOR STI (SEXUALLY TRANSMITTED INFECTION): Status: RESOLVED | Noted: 2022-10-26 | Resolved: 2022-12-25

## 2023-01-01 PROBLEM — Z00.00 ENCOUNTER FOR ANNUAL PHYSICAL EXAM: Status: ACTIVE | Noted: 2023-01-01

## 2023-01-02 NOTE — ASSESSMENT & PLAN NOTE
Here for physicaL  Encouraged to eat 3 x a day and carry snacks as her schedule does not alway allow her a lunch break  Hearing eval and weight managment referral  This will also allow her to meet with dietician   Prevnar 20 vaccine also administered

## 2023-02-08 ENCOUNTER — CONSULT (OUTPATIENT)
Dept: BARIATRICS | Facility: CLINIC | Age: 28
End: 2023-02-08

## 2023-02-08 VITALS
HEIGHT: 65 IN | SYSTOLIC BLOOD PRESSURE: 104 MMHG | HEART RATE: 58 BPM | BODY MASS INDEX: 30.02 KG/M2 | WEIGHT: 180.2 LBS | DIASTOLIC BLOOD PRESSURE: 71 MMHG

## 2023-02-08 DIAGNOSIS — E66.9 OBESITY, CLASS I, BMI 30-34.9: Primary | ICD-10-CM

## 2023-02-08 DIAGNOSIS — R63.8 ABNORMAL CRAVING: ICD-10-CM

## 2023-02-08 PROBLEM — E66.811 OBESITY, CLASS I, BMI 30-34.9: Status: ACTIVE | Noted: 2023-02-08

## 2023-02-08 RX ORDER — BUPROPION HYDROCHLORIDE 150 MG/1
150 TABLET ORAL DAILY
Qty: 30 TABLET | Refills: 1 | Status: SHIPPED | OUTPATIENT
Start: 2023-02-08

## 2023-02-08 NOTE — PROGRESS NOTES
Assessment/Plan:    Obesity, Class I, BMI 30-34 9  -Discussed options of Healthy[facility]-Intensive Lifestyle Intervention Program, Very Low Calorie Diet-VLCD and Conservative Program and the role of weight loss medications  Explained the importance of making lifestyle changes first before starting anti-obesity medications   -Initial weight loss goal of 5-10% weight loss for improved health  -Screening labs  Recommend checking lab coverage before having labs drawn   -Labs reviewed from 12/15/22 all within acceptable limits  - STOP BANG-1/8    -Patient is interested in pursuing conservative program but also considering healthycore  -Calorie goals (1000 diana non-active and 1200 active days), sample menu ([id number]), portion size guidelines, and food logging reviewed with the patient  Goals  -Food log (ie ) www myfitnesspal com,sparkpeople  com,loseit com,calorieking  com,etc  - 1000 diana non-active and 1200 active days  -discussed trying to be consistent with meal timing and making sure to have well rounded diet  -No sugary beverages  At least 64oz of water daily  To continue to avoid redbull  -continue with the gym 3 times a week     Initial Weight:180 2  Goal Weight:160    To start wellbutrin to help with emotional eating and mood  Side effects discussed today      I have spent 45 minutes with Patient  today in which greater than 50% of this time was spent in counseling/coordination of care regarding Diagnostic results, Risks and benefits of tx options, Intructions for management, Patient and family education, Importance of tx compliance, Risk factor reductions and Impressions  Follow up in approximately 2 months with Non-Surgical Physician/Advanced Practitioner  Diagnoses and all orders for this visit:    Obesity, Class I, BMI 30-34 9    BMI 31 0-31 9,adult  -     Ambulatory Referral to Weight Management  -     buPROPion (Wellbutrin XL) 150 mg 24 hr tablet;  Take 1 tablet (150 mg total) by mouth daily    Abnormal craving  -     buPROPion (Wellbutrin XL) 150 mg 24 hr tablet; Take 1 tablet (150 mg total) by mouth daily          Subjective:   Chief Complaint   Patient presents with   • Consult     New -Consult MWM S/B  1/8 negative  Waist 35 5 inch  Patient ID: Marlene Issa  is a 24 y o  female with excess weight/obesity here to pursue weight management  Past Medical History:   Diagnosis Date   • Abnormal Pap smear of cervix    • Hidradenitis     gali buttocks and axilla - open areas - draining intermittently   • History of COVID-19     12/21/21- no hospitalization   • HPV in female    • Wears glasses        HPI: Here for MWM consult  She currently is feeling stuck at her weight  She reports she was 206lb at the end of summer/early fall  She then started to exercise and started to lose weight  She has not been able to lose more now  She does not feel she has a large appetite but she is an emotional eater  She did go through a breakup recent and has been feeling more down  Obesity/Excess Weight:  Severity: class I  Onset:  A few years    Modifiers: Diet and Exercise  Contributing factors: Insufficient Physical Activity and Stress/Emotional Eating  Associated symptoms: clothes do not fit      Goals:160lb    Hydration:water varies 0 to 12 bottles, 1 soda a week, black coffee, red bulls 4 a day(cut out this week)  Alcohol: 1-2 times a week 4 times   Exercise:3 times a week 1 hour cardio/weight  Occupation: at night  Sleep:about 5 hours, trying to get at least 8 hours  Dining out/takeout:daily  Cravings: rare sweet tooth around menses    Colonoscopy-Not applicable    Diet Recall:  B (AM before going to sleep):Leftovers  D: cafe at work in [employer]   Varies on meal eaten   S: rare    The following portions of the patient's history were reviewed and updated as appropriate:   She  has a past medical history of Abnormal Pap smear of cervix, Hidradenitis, History of COVID-19, HPV in female, and Wears glasses  She   Patient Active Problem List    Diagnosis Date Noted   • Obesity, Class I, BMI 30-34 9 02/08/2023   • Encounter for annual physical exam 01/01/2023   • Hypertriglyceridemia 10/26/2022   • Chronic pain of left knee 10/26/2022   • Encounter for hepatitis C screening test for low risk patient 10/26/2022   • Hidradenitis 02/07/2019   • Hx of multiple concussions 03/19/2015   • Refraction disorder 03/19/2015   • Actinic keratosis 01/31/2005     She  has a past surgical history that includes Wound debridement (Bilateral, 2/11/2022); AXILLARY HIDRADENITIS EXCISION (Left, 3/15/2022); Skin biopsy; and AXILLARY HIDRADENITIS EXCISION (Right, 9/2/2022)  Her family history includes ADD / ADHD in her father; Alcohol abuse in her father; Asthma in her brother; Diabetes in her maternal grandmother and paternal grandmother; Endocrine tumor in her mother; Hypertension in her maternal grandmother  She  reports that she has been smoking cigars  She has never used smokeless tobacco  She reports current alcohol use  She reports current drug use  Frequency: 7 00 times per week  Drug: Marijuana  Current Outpatient Medications   Medication Sig Dispense Refill   • buPROPion (Wellbutrin XL) 150 mg 24 hr tablet Take 1 tablet (150 mg total) by mouth daily 30 tablet 1   • clindamycin (CLEOCIN T) 1 % lotion Apply topically twice a day (Patient not taking: Reported on 2/8/2023) 60 mL 6     No current facility-administered medications for this visit  Current Outpatient Medications on File Prior to Visit   Medication Sig   • clindamycin (CLEOCIN T) 1 % lotion Apply topically twice a day (Patient not taking: Reported on 2/8/2023)     No current facility-administered medications on file prior to visit  She is allergic to oxycodone-acetaminophen and percolone [oxycodone]       Review of Systems   Constitutional: Positive for fatigue  Negative for fever  Respiratory: Negative for shortness of breath  Cardiovascular: Negative for chest pain and palpitations  Gastrointestinal: Negative for abdominal pain, constipation, diarrhea and vomiting  Genitourinary: Negative for difficulty urinating  Musculoskeletal: Negative for arthralgias and back pain  Skin: Negative for rash  Neurological: Negative for headaches  Psychiatric/Behavioral: Positive for dysphoric mood (sometimes)  The patient is not nervous/anxious  Objective:    /71   Pulse 58   Ht 5' 4 5" (1 638 m)   Wt 81 7 kg (180 lb 3 2 oz)   BMI 30 45 kg/m²     Physical Exam  Vitals and nursing note reviewed  Constitutional:       General: She is not in acute distress  Appearance: She is well-developed  She is obese  HENT:      Head: Normocephalic and atraumatic  Eyes:      Conjunctiva/sclera: Conjunctivae normal    Neck:      Thyroid: No thyromegaly  Pulmonary:      Effort: Pulmonary effort is normal  No respiratory distress  Skin:     Findings: No rash (visible)  Neurological:      Mental Status: She is alert and oriented to person, place, and time     Psychiatric:         Mood and Affect: Mood normal          Behavior: Behavior normal

## 2023-02-21 DIAGNOSIS — R63.8 ABNORMAL CRAVING: ICD-10-CM

## 2023-02-22 RX ORDER — BUPROPION HYDROCHLORIDE 150 MG/1
TABLET ORAL
Qty: 90 TABLET | Refills: 1 | Status: SHIPPED | OUTPATIENT
Start: 2023-02-22

## 2023-08-25 ENCOUNTER — HOSPITAL ENCOUNTER (EMERGENCY)
Facility: HOSPITAL | Age: 28
Discharge: HOME | End: 2023-08-25
Attending: EMERGENCY MEDICINE
Payer: COMMERCIAL

## 2023-08-25 VITALS
OXYGEN SATURATION: 100 % | DIASTOLIC BLOOD PRESSURE: 90 MMHG | SYSTOLIC BLOOD PRESSURE: 137 MMHG | TEMPERATURE: 98 F | RESPIRATION RATE: 18 BRPM | HEART RATE: 61 BPM

## 2023-08-25 DIAGNOSIS — M79.18 MUSCULOSKELETAL PAIN: Primary | ICD-10-CM

## 2023-08-25 PROCEDURE — 96372 THER/PROPH/DIAG INJ SC/IM: CPT

## 2023-08-25 PROCEDURE — 99284 EMERGENCY DEPT VISIT MOD MDM: CPT | Performed by: EMERGENCY MEDICINE

## 2023-08-25 PROCEDURE — 99282 EMERGENCY DEPT VISIT SF MDM: CPT

## 2023-08-25 RX ORDER — KETOROLAC TROMETHAMINE 30 MG/ML
15 INJECTION, SOLUTION INTRAMUSCULAR; INTRAVENOUS ONCE
Status: COMPLETED | OUTPATIENT
Start: 2023-08-25 | End: 2023-08-25

## 2023-08-25 RX ORDER — LIDOCAINE 50 MG/G
2 PATCH TOPICAL ONCE
Status: DISCONTINUED | OUTPATIENT
Start: 2023-08-25 | End: 2023-08-26 | Stop reason: HOSPADM

## 2023-08-25 RX ORDER — ACETAMINOPHEN 325 MG/1
975 TABLET ORAL ONCE
Status: COMPLETED | OUTPATIENT
Start: 2023-08-25 | End: 2023-08-25

## 2023-08-25 RX ADMIN — LIDOCAINE 5% 2 PATCH: 700 PATCH TOPICAL at 21:48

## 2023-08-25 RX ADMIN — ACETAMINOPHEN 975 MG: 325 TABLET, FILM COATED ORAL at 21:50

## 2023-08-25 RX ADMIN — KETOROLAC TROMETHAMINE 15 MG: 30 INJECTION, SOLUTION INTRAMUSCULAR; INTRAVENOUS at 21:51

## 2023-08-26 NOTE — ED ATTENDING ATTESTATION
8/25/2023  IDanny MD, saw and evaluated the patient. I have discussed the patient with the resident/non-physician practitioner and agree with the resident's/non-physician practitioner's findings, Plan of Care, and MDM as documented in the resident's/non-physician practitioner's note, except where noted. All available labs and Radiology studies were reviewed. I was present for key portions of any procedure(s) performed by the resident/non-physician practitioner and I was immediately available to provide assistance. At this point I agree with the current assessment done in the Emergency Department. I have conducted an independent evaluation of this patient a history and physical is as follows:    OA: 33 y/o f with shoulder pain. Pt states that she has had issues with bilateral shoulder pain that has been ongoing intermittently x few weeks. Pt states that the pain starts in her trapezius area and occassionally radiates down her arm. She is R handed and first started on her right side. That pain has improved but now she has the same pain on her left. She points to her trapezius when describing where the pain is located. The pain is described as sore to aching. No associated numbness/weakness/tingling. No neck or back pain. No headaches/lightheadedness. No cp/sob. No abd pain, n/v. No fevers/chills. No falls/trauma. No IVDU/immunocompromise. Mild relief with chiropractic manipulation and OTC medications. Did find relief while on vacation but since returning has had ongoing pain. 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 On physical exam patient is nontoxic sitting upright on stretcher. HEENT is normocephalic and atraumatic with moist mucous membranes. Neck is supple full range of motion no midline tenderness to palpation over CT or L-spine. No step-off no deformity. Patient has full range of motion of her neck in all planes.   Is able to move her neck from left to right up and down no meningismus no stiffness no discomfort. Patient with reproducible pain on palpation of trapezius on the left. Mild discomfort on the right. Patient has 5 out of 5 strength and intact sensation times bilateral upper and lower extremities. Patient has full range of motion of extremities. No palpation over shoulders. No palpation over clavicles. Does have some slight reproduction of discomfort when she does cross body movements with her left arm but otherwise no pain with arm movements. Intact distal pulses. Intact . Heart is regular rate. Lungs are clear. Abdomen soft. Intact gait. Awake alert oriented. Assessment and plan trapezius/shoulder pain. Pain is reproducible on palpation. Concern for MSK symptoms. Patient does not have any neurological symptoms. Did discuss potential imaging at this time. Patient prefers to start with pain control and follow-up with comprehensive spine. Discussed that she will receive a call from them and confirmed patient's phone number. Discussed strict follow-up and return precautions.     ED Course         Critical Care Time  Procedures

## 2023-08-26 NOTE — DISCHARGE INSTRUCTIONS
Referral was placed for the comprehensive spine program.    You can take ibuprofen, Tylenol, and lidocaine patches as needed for pain. Return to the emergency department if symptoms worsen or if you have any other concerns.

## 2023-08-27 NOTE — ED PROVIDER NOTES
History  Chief Complaint   Patient presents with   • Shoulder Pain     Pt c/o right shoulder pain x 1-2 months. Pt states it would come and go but this week started hurting again. States 2 days ago her left shoulder started to hurt and then her right elbow. Pt concerned the pains are spreading. Pt denies any fevers n/v/d or other recent illnesses. FRANCESCO Nassar is a 34 y.o. female who presents to the emergency department with bilateral shoulder pain. She has had pain around the right trapezius and right shoulder for the past 2 months, over the past few weeks she also started having pain along the left trapezius and shoulder. She denies neck pain, weakness, numbness, falls, or injuries. She states at work she does reach with her arms a lot and sometimes sits leaning on the elbows which she thinks may worsen the symptoms. Prior to Admission Medications   Prescriptions Last Dose Informant Patient Reported? Taking?    buPROPion (WELLBUTRIN XL) 150 mg 24 hr tablet   No No   Sig: TAKE 1 TABLET BY MOUTH EVERY DAY   clindamycin (CLEOCIN T) 1 % lotion   No No   Sig: Apply topically twice a day   Patient not taking: Reported on 2/8/2023      Facility-Administered Medications: None       Past Medical History:   Diagnosis Date   • Abnormal Pap smear of cervix    • Hidradenitis     gali buttocks and axilla - open areas - draining intermittently   • History of COVID-19     12/21/21- no hospitalization   • HPV in female    • Wears glasses        Past Surgical History:   Procedure Laterality Date   • AXILLARY HIDRADENITIS EXCISION Left 3/15/2022    Procedure: EXCISION HIDRADENITIS AXILLARY;  Surgeon: Isaiah Carrera MD;  Location: [facility];  Service: General   • AXILLARY HIDRADENITIS EXCISION Right 9/2/2022    Procedure: EXCISION HIDRADENITIS AXILLARY;  Surgeon: Isaiah Carrera MD;  Location: [facility];  Service: General   • SKIN BIOPSY     • WOUND DEBRIDEMENT Bilateral 2/11/2022    Procedure: EXCISIONAL DEBRIDEMENT of bilateral buttocks; Surgeon: Willie Hatchet, MD;  Location: [facility];  Service: General       Family History   Problem Relation Age of Onset   • Endocrine tumor Mother         pituitary   • Alcohol abuse Father    • ADD / ADHD Father    • Asthma Brother    • Diabetes Maternal Grandmother    • Hypertension Maternal Grandmother    • Diabetes Paternal Grandmother      I have reviewed and agree with the history as documented. E-Cigarette/Vaping   • E-Cigarette Use Never User      E-Cigarette/Vaping Substances   • Nicotine No    • THC No    • CBD No    • Flavoring No    • Other No    • Unknown No      Social History     Tobacco Use   • Smoking status: Some Days     Types: Cigars   • Smokeless tobacco: Never   Vaping Use   • Vaping Use: Never used   Substance Use Topics   • Alcohol use: Yes     Comment: socially   • Drug use: Yes     Frequency: 7.0 times per week     Types: Marijuana       Home medications:  Prior to Admission Medications   Prescriptions Last Dose Informant Patient Reported? Taking? buPROPion (WELLBUTRIN XL) 150 mg 24 hr tablet   No No   Sig: TAKE 1 TABLET BY MOUTH EVERY DAY   clindamycin (CLEOCIN T) 1 % lotion   No No   Sig: Apply topically twice a day   Patient not taking: Reported on 2/8/2023      Facility-Administered Medications: None     Allergies: Allergies   Allergen Reactions   • Oxycodone-Acetaminophen Drowsiness   • Percolone [Oxycodone] GI Intolerance        Review of Systems   Constitutional: Negative for fever. Respiratory: Negative for shortness of breath. Cardiovascular: Negative for chest pain. Gastrointestinal: Negative for abdominal pain, nausea and vomiting. Genitourinary: Negative for difficulty urinating, vaginal bleeding and vaginal discharge. Musculoskeletal: Negative for back pain and neck pain. Neurological: Negative for weakness and numbness. All other systems reviewed and are negative.       Physical Exam  ED Triage Vitals [08/25/23 1928] Temperature Pulse Respirations Blood Pressure SpO2   98 °F (36.7 °C) 61 18 137/90 100 %      Temp Source Heart Rate Source Patient Position - Orthostatic VS BP Location FiO2 (%)   Temporal Monitor Sitting Right arm --      Pain Score       6             Orthostatic Vital Signs  Vitals:    08/25/23 1928   BP: 137/90   Pulse: 61   Patient Position - Orthostatic VS: Sitting       Physical Exam  Vitals and nursing note reviewed. Constitutional:       General: She is not in acute distress. Appearance: She is not ill-appearing. HENT:      Head: Normocephalic and atraumatic. Mouth/Throat:      Mouth: Mucous membranes are moist.      Pharynx: Oropharynx is clear. Eyes:      Extraocular Movements: Extraocular movements intact. Pupils: Pupils are equal, round, and reactive to light. Cardiovascular:      Rate and Rhythm: Normal rate and regular rhythm. Heart sounds: No murmur heard. Pulmonary:      Effort: Pulmonary effort is normal. No respiratory distress. Breath sounds: Normal breath sounds. No wheezing, rhonchi or rales. Abdominal:      General: Abdomen is flat. There is no distension. Palpations: Abdomen is soft. Tenderness: There is no abdominal tenderness. There is no guarding or rebound. Musculoskeletal:      Cervical back: Normal range of motion. No rigidity or tenderness. Comments: No midline C, T, or L-spine tenderness. Tenderness reproducible along bilateral trapezius muscles. Skin:     General: Skin is warm and dry. Neurological:      Mental Status: She is alert. Cranial Nerves: No cranial nerve deficit. Sensory: No sensory deficit. Motor: No weakness.          ED Medications  Medications   ketorolac (TORADOL) injection 15 mg (15 mg Intramuscular Given 8/25/23 2151)   acetaminophen (TYLENOL) tablet 975 mg (975 mg Oral Given 8/25/23 2150)       Diagnostic Studies  Results Reviewed     None                 No orders to display Procedures  Procedures      ED Course                                       [facility]  Juan Luis Hall is a 34 y.o. female who presents to the emergency department with bilateral trapezius soreness. Workup including vital signs, physical exam.  No back or neck pain, neurologic exam without focal abnormalities, tenderness reproducible along bilateral trapezius muscles. Most likely diagnosis musculoskeletal pain. Improvement in symptoms after multimodal analgesia. Stable for discharge home with Outpatient follow up, discharge instructions and return precautions given. Disposition  Final diagnoses:   Musculoskeletal pain     Time reflects when diagnosis was documented in both MDM as applicable and the Disposition within this note     Time User Action Codes Description Comment    8/25/2023 10:35 PM Isabelle Flores Add [V02.70] Musculoskeletal pain       ED Disposition     ED Disposition   Discharge    Condition   Stable    Date/Time   Fri Aug 25, 2023 10:35 PM    Comment   Juan Luis Hall discharge to home/self care. Follow-up Information     Follow up With Specialties Details Why Contact Info Additional Information    [facility] Physical Therapy Call   [phone]          Discharge Medication List as of 8/25/2023 10:36 PM      CONTINUE these medications which have NOT CHANGED    Details   buPROPion (WELLBUTRIN XL) 150 mg 24 hr tablet TAKE 1 TABLET BY MOUTH EVERY DAY, Normal      clindamycin (CLEOCIN T) 1 % lotion Apply topically twice a day, Normal                 PDMP Review       Value Time User    PDMP Reviewed  Yes 2/8/2023  3:18 PM Jamey Bateman PA-C           ED Provider  Attending physically available and evaluated [facility] I managed the patient along with the ED Attending. Electronically Signed by    Portions of the record may have been created with voice recognition software.   Occasional wrong word or "sound a like" substitutions may have occurred due to the inherent limitations of voice recognition software.   Read the chart carefully and recognize, using context, where substitutions have occurred     Judy Leyva MD  08/27/23 [id number]

## 2023-08-28 ENCOUNTER — TELEPHONE (OUTPATIENT)
Dept: PHYSICAL THERAPY | Facility: OTHER | Age: 28
End: 2023-08-28

## 2023-08-28 ENCOUNTER — NURSE TRIAGE (OUTPATIENT)
Dept: PHYSICAL THERAPY | Facility: OTHER | Age: 28
End: 2023-08-28

## 2023-08-28 DIAGNOSIS — M54.2 ACUTE NECK PAIN: Primary | ICD-10-CM

## 2023-08-28 NOTE — TELEPHONE ENCOUNTER
This nurse called the patient to proceed with the triage initiated earlier today. Message left stating reason for call, [facility] contact information, and hours of operation. Encouraged patient to CB if she would like to complete the triage and referral process. Referral closed per protocol and will await possible call-back from the patient.

## 2023-08-28 NOTE — TELEPHONE ENCOUNTER
Additional Information  • Negative: Is this related to a work injury? • Negative: Is this related to an MVA? • Negative: Are you currently recieving homecare services? Background - Initial Assessment  Clinical complaint: Pain is left shoulder and left upper back. No numbness or tingling. Was seen in ED 8/25/23. NKI Originally when pain started 2 months ago it was on the right side. Approx 1 week ago it switched to the left and has remained on just the left side.    Date of onset: 2 months/ 1 week for left side where pain is currently  Frequency of pain: intermittent  Quality of pain: aching    Protocols used: [facility] COMPREHENSIVE SPINE PROGRAM PROTOCOL

## 2023-08-29 NOTE — TELEPHONE ENCOUNTER
Additional Information  • Negative: Has the patient had unexplained weight loss? • Negative: Does the patient have a fever? • Negative: Is the patient experiencing urine retention? • Negative: Is the patient experiencing acute drop foot or paralysis? • Negative: Has the patient experienced major trauma? (fall from height, high speed collision, direct blow to spine) and is also experiencing nausea, light-headedness, or loss of consciousness? • Negative: Is the patient experiencing blood in sputum? • Negative: Is this a chronic condition? Protocols used: [facility]B COMPREHENSIVE SPINE PROGRAM PROTOCOL    This RN did review in detail the Comprehensive Sp[facility] Program and what we can provide for their neck pain. Patient is agreeable to being triaged by this RN and would like to proceed with Physical Therapy. Referral was placed for Physical Therapy at the [place] site. Patients information was sent to the  to make evaluation appointment. Patient made aware that the PT office  will be calling to schedule the appointment. Patient was provided with the phone number to the PT office. No further questions and/or concerns were voiced by the patient at this time. Patient states understanding of the referral that was placed. Referral Closed.

## 2023-08-29 NOTE — ADDENDUM NOTE
Addended by: Maria Del Rosario Gardner on: 8/29/2023 04:15 PM     Modules accepted: Orders, SmartSet

## 2023-09-13 ENCOUNTER — TELEPHONE (OUTPATIENT)
Dept: PHYSICAL THERAPY | Facility: OTHER | Age: 28
End: 2023-09-13

## 2023-09-13 ENCOUNTER — OFFICE VISIT (OUTPATIENT)
Dept: FAMILY MEDICINE CLINIC | Facility: CLINIC | Age: 28
End: 2023-09-13
Payer: COMMERCIAL

## 2023-09-13 VITALS
HEART RATE: 66 BPM | OXYGEN SATURATION: 99 % | RESPIRATION RATE: 14 BRPM | SYSTOLIC BLOOD PRESSURE: 118 MMHG | HEIGHT: 64 IN | WEIGHT: 175 LBS | DIASTOLIC BLOOD PRESSURE: 78 MMHG | TEMPERATURE: 97 F | BODY MASS INDEX: 29.88 KG/M2

## 2023-09-13 DIAGNOSIS — M25.511 ACUTE PAIN OF BOTH SHOULDERS: ICD-10-CM

## 2023-09-13 DIAGNOSIS — M54.12 CERVICAL RADICULOPATHY: Primary | ICD-10-CM

## 2023-09-13 DIAGNOSIS — M25.512 ACUTE PAIN OF BOTH SHOULDERS: ICD-10-CM

## 2023-09-13 PROCEDURE — 99214 OFFICE O/P EST MOD 30 MIN: CPT | Performed by: FAMILY MEDICINE

## 2023-09-13 RX ORDER — MELOXICAM 7.5 MG/1
7.5 TABLET ORAL DAILY
Qty: 30 TABLET | Refills: 0 | Status: SHIPPED | OUTPATIENT
Start: 2023-09-13

## 2023-09-13 RX ORDER — CYCLOBENZAPRINE HCL 5 MG
5 TABLET ORAL
Qty: 21 TABLET | Refills: 0 | Status: SHIPPED | OUTPATIENT
Start: 2023-09-13

## 2023-09-13 NOTE — PROGRESS NOTES
Name: Rachna Lund      : 1995      MRN: [id number]  Encounter Provider: Molly Olson MD  Encounter Date: 2023   Encounter department: [facility] [address] today with bilateral shoulder pain and trapezius tenderness. Seen in the ED  and referred to PT but has not scheduled. No weakness or numbness in the arms or hands. No injuries. Positive Neer and thompson. Negative Spurling. Suspect trapezius strain and poss radiculopathy given the pain radiating down to the elbow. PT, NSAIDS, and muscle relaxer prescribed. Flexeril to be taken at night and Mobic with food. Call if pain does not improve. 1. Cervical radiculopathy  -     cyclobenzaprine (FLEXERIL) 5 mg tablet; Take 1 tablet (5 mg total) by mouth daily at bedtime  -     meloxicam (Mobic) 7.5 mg tablet; Take 1 tablet (7.5 mg total) by mouth daily  -     Ambulatory Referral to Physical Therapy; Future    2. Acute pain of both shoulders  -     cyclobenzaprine (FLEXERIL) 5 mg tablet; Take 1 tablet (5 mg total) by mouth daily at bedtime  -     meloxicam (Mobic) 7.5 mg tablet; Take 1 tablet (7.5 mg total) by mouth daily  -     Ambulatory Referral to Physical Therapy; Future           Subjective      Shoulder Pain  Patient complains of bilateral shoulder pain. The symptoms began several months ago. Aggravating factors: no known event. Pain is located between the neck and shoulder and diffusely throughout the shoulder. Also has pain that radiates to the right elbow. Discomfort is described as aching, sharp/stabbing and throbbing. Symptoms are exacerbated by repetitive movements and overhead movements.  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 Seen in the ED 23 and referred to PT. Evaluation to date: none. Not taking anything for the pain.  Laying down at night is difficult due to pain    Review of Systems   Musculoskeletal: Positive for arthralgias (b/l shoulder pain ) and back pain (upper back ). Neurological: Negative for weakness, numbness and headaches. Current Outpatient Medications on File Prior to Visit   Medication Sig   • buPROPion (WELLBUTRIN XL) 150 mg 24 hr tablet TAKE 1 TABLET BY MOUTH EVERY DAY (Patient not taking: Reported on 9/13/2023)   • clindamycin (CLEOCIN T) 1 % lotion Apply topically twice a day (Patient not taking: Reported on 2/8/2023)       Objective     /78 (BP Location: Left arm, Patient Position: Sitting, Cuff Size: Standard)   Pulse 66   Temp (!) 97 °F (36.1 °C) (Tympanic)   Resp 14   Ht 5' 4.37" (1.635 m)   Wt 79.4 kg (175 lb)   LMP 08/05/2023   SpO2 99%   BMI 29.69 kg/m²     Physical Exam  Vitals reviewed. Constitutional:       General: She is not in acute distress. Appearance: Normal appearance. She is not ill-appearing or toxic-appearing. HENT:      Head: Normocephalic and atraumatic. Neck:      Comments: Neg Spurling   Musculoskeletal:      Right shoulder: Tenderness present. No swelling, deformity, effusion, laceration or bony tenderness. Normal range of motion. Normal strength. Normal pulse. Left shoulder: Tenderness (along the scapula and trapezius muscle) present. No swelling, deformity, effusion, laceration or bony tenderness. Normal range of motion. Normal strength. Normal pulse. Right hand: Normal strength. Normal sensation. Left hand: Normal strength. Normal sensation. Cervical back: Tenderness present. No rigidity. Muscular tenderness present. No spinous process tenderness. Skin:     General: Skin is warm. Neurological:      Mental Status: She is alert.        Karoline Valladares MD

## 2023-09-13 NOTE — TELEPHONE ENCOUNTER
Courtesy call placed to the patient per [facility] after being triaged on 08/28. No appt scheduled yet. V/M left for patient. I encouraged patient to call the [facility] PT site to schedule an eval. Phone number and address provided.

## 2023-10-06 ENCOUNTER — APPOINTMENT (EMERGENCY)
Dept: RADIOLOGY | Facility: HOSPITAL | Age: 28
End: 2023-10-06
Payer: COMMERCIAL

## 2023-10-06 ENCOUNTER — HOSPITAL ENCOUNTER (EMERGENCY)
Facility: HOSPITAL | Age: 28
Discharge: HOME | End: 2023-10-06
Attending: EMERGENCY MEDICINE
Payer: COMMERCIAL

## 2023-10-06 VITALS
OXYGEN SATURATION: 99 % | HEART RATE: 71 BPM | RESPIRATION RATE: 18 BRPM | DIASTOLIC BLOOD PRESSURE: 62 MMHG | SYSTOLIC BLOOD PRESSURE: 128 MMHG | TEMPERATURE: 97.6 F

## 2023-10-06 DIAGNOSIS — M25.569 KNEE PAIN: Primary | ICD-10-CM

## 2023-10-06 PROCEDURE — 73564 X-RAY EXAM KNEE 4 OR MORE: CPT

## 2023-10-06 PROCEDURE — 99284 EMERGENCY DEPT VISIT MOD MDM: CPT | Performed by: EMERGENCY MEDICINE

## 2023-10-06 PROCEDURE — 96372 THER/PROPH/DIAG INJ SC/IM: CPT

## 2023-10-06 PROCEDURE — 99283 EMERGENCY DEPT VISIT LOW MDM: CPT

## 2023-10-06 RX ORDER — KETOROLAC TROMETHAMINE 30 MG/ML
15 INJECTION, SOLUTION INTRAMUSCULAR; INTRAVENOUS ONCE
Status: COMPLETED | OUTPATIENT
Start: 2023-10-06 | End: 2023-10-06

## 2023-10-06 RX ADMIN — KETOROLAC TROMETHAMINE 15 MG: 30 INJECTION, SOLUTION INTRAMUSCULAR; INTRAVENOUS at 20:18

## 2023-10-06 NOTE — ED PROVIDER NOTES
History  Chief Complaint   Patient presents with   • Pain     Right sided shoulder pain for over 3 mos has been seen for it before, recently started with right knee pain. Her boss sent her home from work due to pain. 65-year-old female patient presenting with right knee pain onset a week ago. Patient states that she has had right shoulder pain radiating to her back intermittently for 3 months. Patient states about a week ago she has been having intermittent right knee pain. Patient states pain worsens as she is sitting for a long time. Patient is a  for work. Denies any injuries or trauma. Denies any numbness or tingling. Patient states that she has been using a lidocaine patch with improvement of her symptoms. Denies fevers, rash, numbness or tingling. Prior to Admission Medications   Prescriptions Last Dose Informant Patient Reported? Taking?    buPROPion (WELLBUTRIN XL) 150 mg 24 hr tablet   No No   Sig: TAKE 1 TABLET BY MOUTH EVERY DAY   Patient not taking: Reported on 9/13/2023   clindamycin (CLEOCIN T) 1 % lotion   No No   Sig: Apply topically twice a day   Patient not taking: Reported on 2/8/2023   cyclobenzaprine (FLEXERIL) 5 mg tablet   No No   Sig: Take 1 tablet (5 mg total) by mouth daily at bedtime   meloxicam (Mobic) 7.5 mg tablet   No No   Sig: Take 1 tablet (7.5 mg total) by mouth daily      Facility-Administered Medications: None       Past Medical History:   Diagnosis Date   • Abnormal Pap smear of cervix    • Hidradenitis     gali buttocks and axilla - open areas - draining intermittently   • History of COVID-19     12/21/21- no hospitalization   • HPV in female    • Wears glasses        Past Surgical History:   Procedure Laterality Date   • AXILLARY HIDRADENITIS EXCISION Left 3/15/2022    Procedure: EXCISION HIDRADENITIS AXILLARY;  Surgeon: Oli Walton MD;  Location: [facility];  Service: General   • AXILLARY HIDRADENITIS EXCISION Right 9/2/2022    Procedure: EXCISION HIDRADENITIS AXILLARY;  Surgeon: Toshia Rolon MD;  Location: [facility];  Service: General   • SKIN BIOPSY     • WOUND DEBRIDEMENT Bilateral 2/11/2022    Procedure: EXCISIONAL DEBRIDEMENT of bilateral buttocks; Surgeon: Toshia Rolon MD;  Location: [facility];  Service: General       Family History   Problem Relation Age of Onset   • Endocrine tumor Mother         pituitary   • Alcohol abuse Father    • ADD / ADHD Father    • Asthma Brother    • Diabetes Maternal Grandmother    • Hypertension Maternal Grandmother    • Diabetes Paternal Grandmother      I have reviewed and agree with the history as documented. E-Cigarette/Vaping   • E-Cigarette Use Never User      E-Cigarette/Vaping Substances   • Nicotine No    • THC No    • CBD No    • Flavoring No    • Other No    • Unknown No      Social History     Tobacco Use   • Smoking status: Some Days     Types: Cigars   • Smokeless tobacco: Never   Vaping Use   • Vaping Use: Never used   Substance Use Topics   • Alcohol use: Yes     Comment: socially   • Drug use: Yes     Frequency: 7.0 times per week     Types: Marijuana        Review of Systems   Musculoskeletal:        Right knee pain. Chronic right shoulder pain   All other systems reviewed and are negative. Physical Exam  ED Triage Vitals [10/06/23 1837]   Temperature Pulse Respirations Blood Pressure SpO2   97.6 °F (36.4 °C) 71 18 128/62 99 %      Temp Source Heart Rate Source Patient Position - Orthostatic VS BP Location FiO2 (%)   Temporal -- -- -- --      Pain Score       1             Orthostatic Vital Signs  Vitals:    10/06/23 1837   BP: 128/62   Pulse: 71       Physical Exam  Vitals reviewed. Constitutional:       Appearance: Normal appearance. HENT:      Head: Normocephalic and atraumatic. Nose: Nose normal.      Mouth/Throat:      Mouth: Mucous membranes are moist.      Pharynx: Oropharynx is clear. Eyes:      Extraocular Movements: Extraocular movements intact. Conjunctiva/sclera: Conjunctivae normal.   Cardiovascular:      Rate and Rhythm: Normal rate and regular rhythm. Pulses: Normal pulses. Heart sounds: Normal heart sounds. Pulmonary:      Effort: Pulmonary effort is normal.      Breath sounds: Normal breath sounds. Musculoskeletal:         General: No swelling or tenderness (No tenderness to right knee). Normal range of motion. Cervical back: Normal range of motion. Comments: Full range of motion of right knee   Skin:     General: Skin is warm and dry. Neurological:      General: No focal deficit present. Mental Status: She is alert and oriented to person, place, and time. Mental status is at baseline. Gait: Gait normal.         ED Medications  Medications   ketorolac (TORADOL) injection 15 mg (15 mg Intramuscular Given 10/6/23 2018)       Diagnostic Studies  Results Reviewed     None                 XR knee 4+ vw right injury    (Results Pending)         Procedures  Procedures      ED Course                                       Medical Decision Making  32-year-old female patient presenting with right knee pain. Patient states started a week ago. No injuries or trauma. DDx includes strain, arthritis,. X-ray negative for acute fractures. Patient treated with Toradol with improvement of symptoms. Discussed with patient to follow-up with PCP. Possible that this could be rheumatic related as patient had chronic right shoulder pain. Stable for discharge with follow-up with PCP peer return precautions given. Patient able to ambulate. Amount and/or Complexity of Data Reviewed  Radiology: ordered. Discussion of management or test interpretation with external provider(s): Follow-up with PCP and sports medicine    Risk  Prescription drug management.             Disposition  Final diagnoses:   Knee pain     Time reflects when diagnosis was documented in both MDM as applicable and the Disposition within this note     Time User Action Codes Description Comment    10/6/2023  7:58 PM Willard Waldrop Knee pain       ED Disposition     ED Disposition   Discharge    Condition   Stable    Date/Time   Fri Oct 6, 2023  7:58 PM    Comment   Cammie Alberto discharge to home/self care. Follow-up Information     Follow up With Specialties Details Why [address] Moeller MD Family Medicine Schedule an appointment as soon as possible for a visit   [facility] [id number]  [address] [facility] Schedule an appointment as soon as possible for a visit   [address] [place] [address]  [phone]            Discharge Medication List as of 10/6/2023  7:58 PM      CONTINUE these medications which have NOT CHANGED    Details   buPROPion (WELLBUTRIN XL) 150 mg 24 hr tablet TAKE 1 TABLET BY MOUTH EVERY DAY, Normal      clindamycin (CLEOCIN T) 1 % lotion Apply topically twice a day, Normal      cyclobenzaprine (FLEXERIL) 5 mg tablet Take 1 tablet (5 mg total) by mouth daily at bedtime, Starting Wed 9/13/2023, Normal      meloxicam (Mobic) 7.5 mg tablet Take 1 tablet (7.5 mg total) by mouth daily, Starting Wed 9/13/2023, Normal           No discharge procedures on file. PDMP Review       Value Time User    PDMP Reviewed  Yes 2/8/2023  3:18 PM Jamey Bateman PA-C           ED Provider  Attending physically available and evaluated Cammie Alberto. I managed the patient along with the ED Attending.     Electronically Signed by         Devaughn Man MD  10/06/23 0882

## 2023-10-06 NOTE — DISCHARGE INSTRUCTIONS
You were seen in the ED for right knee pain. Return to the ED for any worsening symptoms or new symptoms. Follow up with your primary care doctor as soon as possible.

## 2023-10-06 NOTE — ED ATTENDING ATTESTATION
Camilo Ahumada MD, saw and evaluated the patient. I have discussed the patient with the resident and agree with the resident's findings, Plan of Care, and MDM as documented in the resident's note, except where noted. All available labs and Radiology studies were reviewed. I was present for key portions of any procedure(s) performed by the resident and I was immediately available to provide assistance. At this point I agree with the current assessment done in the Emergency Department. I have conducted an independent evaluation of this patient a history and physical is as follows:    33 yo obese female with a history of multiple chronic pain disorders, high cholesterol, and hidradenitis presents to the ED complaining of right knee pain. The patient says she injured the knee a year ago but it only started bothering her this week while at work. Her boss saw her rubbing the knee earlier today and told her to go to the ED. She reports a poorly localized "ache" in the knee. No recent falls or trauma to the knee. No swelling, deformity, discoloration, or warmth. No difficulty ambulating or weight bearing. She applied an OTC pain patch to the knee prior to arrival and is currently asymptomatic. (+) Secondary complaint of some chronic right shoulder discomfort x 3 months "but that's not why I'm here". No other specific complaints. ROS: per resident physician note    Gen: NAD, AA&Ox3  HEENT: PERRL, EOMI  Neck: supple  CV: RRR  Lungs: CTA B/L  Abdomen: soft, NT/ND  Right Knee: no swelling or deformity, no effusion, (+) FROM, no erythema/warmth, no instability, no tenderness  Neuro: 5/5 strength all extremities, sensation grossly intact, steady gait  Skin: no rash    ED Course  The patient is very well appearing with stable vital signs and a benign knee exam. She is currently entirely asymptomatic. X-rays are unremarkable.  Plan for supportive care and close follow up with her PCP and/or Orthopedics next week for reassessment. The patient is agreeable to this plan. Strict return precautions provided.       Critical Care Time  Procedures

## 2023-10-12 ENCOUNTER — OFFICE VISIT (OUTPATIENT)
Dept: FAMILY MEDICINE CLINIC | Facility: CLINIC | Age: 28
End: 2023-10-12
Payer: COMMERCIAL

## 2023-10-12 VITALS
SYSTOLIC BLOOD PRESSURE: 114 MMHG | OXYGEN SATURATION: 98 % | TEMPERATURE: 97.5 F | RESPIRATION RATE: 16 BRPM | HEIGHT: 64 IN | DIASTOLIC BLOOD PRESSURE: 70 MMHG | WEIGHT: 178.2 LBS | BODY MASS INDEX: 30.42 KG/M2 | HEART RATE: 81 BPM

## 2023-10-12 DIAGNOSIS — M25.561 ACUTE PAIN OF RIGHT KNEE: Primary | ICD-10-CM

## 2023-10-12 PROCEDURE — 99213 OFFICE O/P EST LOW 20 MIN: CPT | Performed by: FAMILY MEDICINE

## 2023-10-12 RX ORDER — NAPROXEN 500 MG/1
500 TABLET ORAL 2 TIMES DAILY WITH MEALS
Qty: 14 TABLET | Refills: 0 | Status: SHIPPED | OUTPATIENT
Start: 2023-10-12 | End: 2023-10-19

## 2023-10-12 NOTE — PROGRESS NOTES
Assessment/Plan:     Problem List Items Addressed This Visit    None  Visit Diagnoses       Acute pain of right knee    -  Primary    Acute right knee pain x 1.5 weeks. Pain in medial joint line and patella. Xray in the ED was unremarkbale. Start naproxen BID x 7 with food. Also suggest ice, elevation, and compression wraps. If pain doesn't improve, suggest PT or ortho referral     Relevant Medications    naproxen (Naprosyn) 500 mg tablet              Subjective:      Patient ID: Vane Connell is a 34 y.o. female. Patient presents with right knee pain. Started about a week ago. Patient woke up with pain in right knee pain and has gotten worse since. No injures. Worse when bearing weight or when she applies pressure to the medial knee when sleeping. No redenes. Does appear swollen and warm. Seen in the ED 10/6 for the knee. Xray was normal.    Knee Pain   The incident occurred more than 1 week ago. The incident occurred at home. There was no injury mechanism. The pain is present in the right knee. The quality of the pain is described as aching and stabbing. The pain is moderate. The pain has been Constant since onset. Associated symptoms include an inability to bear weight. Pertinent negatives include no loss of motion, loss of sensation, muscle weakness, numbness or tingling. The symptoms are aggravated by movement and weight bearing. She has tried elevation, ice, non-weight bearing and NSAIDs for the symptoms. The treatment provided mild relief. The following portions of the patient's history were reviewed and updated as appropriate: She  has a past medical history of Abnormal Pap smear of cervix, Hidradenitis, History of COVID-19, HPV in female, and Wears glasses.   She   Patient Active Problem List    Diagnosis Date Noted   • Obesity, Class I, BMI 30-34.9 02/08/2023   • Encounter for annual physical exam 01/01/2023   • Hypertriglyceridemia 10/26/2022   • Chronic pain of left knee 10/26/2022   • Encounter for hepatitis C screening test for low risk patient 10/26/2022   • Hidradenitis 02/07/2019   • Hx of multiple concussions 03/19/2015   • Refraction disorder 03/19/2015   • Actinic keratosis 01/31/2005     She  has a past surgical history that includes Wound debridement (Bilateral, 2/11/2022); AXILLARY HIDRADENITIS EXCISION (Left, 3/15/2022); Skin biopsy; and AXILLARY HIDRADENITIS EXCISION (Right, 9/2/2022). Her family history includes ADD / ADHD in her father; Alcohol abuse in her father; Asthma in her brother; Diabetes in her maternal grandmother and paternal grandmother; Endocrine tumor in her mother; Hypertension in her maternal grandmother. She  reports that she has been smoking cigars. She has never used smokeless tobacco. She reports current alcohol use. She reports current drug use. Frequency: 7.00 times per week. Drug: Marijuana. Current Outpatient Medications on File Prior to Visit   Medication Sig   • [DISCONTINUED] buPROPion (WELLBUTRIN XL) 150 mg 24 hr tablet TAKE 1 TABLET BY MOUTH EVERY DAY (Patient not taking: Reported on 9/13/2023)   • [DISCONTINUED] clindamycin (CLEOCIN T) 1 % lotion Apply topically twice a day (Patient not taking: Reported on 2/8/2023)   • [DISCONTINUED] cyclobenzaprine (FLEXERIL) 5 mg tablet Take 1 tablet (5 mg total) by mouth daily at bedtime (Patient not taking: Reported on 10/12/2023)   • [DISCONTINUED] meloxicam (Mobic) 7.5 mg tablet Take 1 tablet (7.5 mg total) by mouth daily (Patient not taking: Reported on 10/12/2023)     No current facility-administered medications on file prior to visit. She is allergic to oxycodone-acetaminophen and percolone [oxycodone]. .    Review of Systems   Constitutional:  Negative for fever. Musculoskeletal:  Positive for arthralgias (right knee pain) and gait problem. Neurological:  Negative for tingling and numbness.          Objective:      /70 (BP Location: Left arm, Patient Position: Sitting, Cuff Size: Adult) Pulse 81   Temp 97.5 °F (36.4 °C) (Tympanic)   Resp 16   Ht 5' 4.37" (1.635 m)   Wt 80.8 kg (178 lb 3.2 oz)   SpO2 98%   BMI 30.24 kg/m²          Physical Exam  Vitals reviewed. Constitutional:       General: She is in acute distress (mild due to pain). Appearance: Normal appearance. She is not ill-appearing or toxic-appearing. Eyes:      Extraocular Movements: Extraocular movements intact. Pulmonary:      Effort: Pulmonary effort is normal.   Musculoskeletal:      Right knee: Swelling and bony tenderness (patella) present. No erythema, ecchymosis or lacerations. Decreased range of motion. Tenderness present over the medial joint line. No LCL laxity, MCL laxity, ACL laxity or PCL laxity. Normal alignment, normal meniscus and normal patellar mobility. Instability Tests: Medial Maurizio test positive. Lateral Maurizio test negative. Skin:     Findings: No erythema or rash. Neurological:      Mental Status: She is alert and oriented to person, place, and time. Gait: Gait abnormal.   Psychiatric:         Mood and Affect: Mood normal.         Behavior: Behavior normal.         Thought Content:  Thought content normal.

## 2023-10-26 ENCOUNTER — OFFICE VISIT (OUTPATIENT)
Dept: FAMILY MEDICINE CLINIC | Facility: CLINIC | Age: 28
End: 2023-10-26
Payer: COMMERCIAL

## 2023-10-26 VITALS
OXYGEN SATURATION: 98 % | DIASTOLIC BLOOD PRESSURE: 57 MMHG | SYSTOLIC BLOOD PRESSURE: 114 MMHG | RESPIRATION RATE: 16 BRPM | WEIGHT: 176.6 LBS | HEART RATE: 84 BPM | BODY MASS INDEX: 29.42 KG/M2 | TEMPERATURE: 96.3 F | HEIGHT: 65 IN

## 2023-10-26 DIAGNOSIS — H91.93 DECREASED HEARING OF BOTH EARS: ICD-10-CM

## 2023-10-26 DIAGNOSIS — Z11.3 SCREEN FOR STD (SEXUALLY TRANSMITTED DISEASE): ICD-10-CM

## 2023-10-26 DIAGNOSIS — Z00.00 ENCOUNTER FOR ANNUAL PHYSICAL EXAM: ICD-10-CM

## 2023-10-26 DIAGNOSIS — M25.561 ACUTE PAIN OF RIGHT KNEE: ICD-10-CM

## 2023-10-26 PROCEDURE — 99395 PREV VISIT EST AGE 18-39: CPT | Performed by: FAMILY MEDICINE

## 2023-10-26 NOTE — PROGRESS NOTES
[facility]    NAME: Hannah Nassar  AGE: 34 y.o. SEX: female  : 1995     DATE: 10/26/2023     Assessment and Plan:     Problem List Items Addressed This Visit     Encounter for annual physical exam   Other Visit Diagnoses     Acute pain of right knee        Subacute pain. Was treated w/ NSAIDS x 1 week but provided minimal relief. Anterior knee pain poss d/t patellofemoral. PT scheduled next week. Ice and compressi    Relevant Orders    Ambulatory Referral to Physical Therapy    Screen for STD (sexually transmitted disease)        Screening requested    Relevant Orders    RPR-Syphilis Screening (Total Syphilis IGG/IGM)    HIV 1/2 AG/AB w Reflex [facility] for 2 yr old and above    Chlamydia/GC amplified DNA by PCR    Decreased hearing of both ears        Gradual decline in hearing noted by her friends. Ear canal and TM grossly normal appearing. Audiology referral for hearing eval.    Relevant Orders    Ambulatory Referral to Audiology          Immunizations and preventive care screenings were discussed with patient today. Appropriate education was printed on patient's after visit summary. Counseling:  Dental Health: discussed importance of regular tooth brushing, flossing, and dental visits. Exercise: the importance of regular exercise/physical activity was discussed. Recommend exercise 3-5 times per week for at least 30 minutes. BMI Counseling: Body mass index is 29.21 kg/m². The BMI is above normal. Nutrition recommendations include encouraging healthy choices of fruits and vegetables, decreasing fast food intake, limiting drinks that contain sugar, moderation in carbohydrate intake, increasing intake of lean protein, reducing intake of saturated and trans fat and reducing intake of cholesterol. Exercise recommendations include moderate physical activity 150 minutes/week. No pharmacotherapy was ordered.  Patient referred to PCP. Rationale for BMI follow-up plan is due to patient being overweight or obese. Depression Screening and Follow-up Plan: Patient was screened for depression during today's encounter. They screened negative with a PHQ-2 score of 0. No follow-ups on file. Chief Complaint:     Chief Complaint   Patient presents with   • Physical Exam      History of Present Illness:     Adult Annual Physical   Patient here for a comprehensive physical exam. Was under her dad's insurance but recently purchased new insurance. The patient reports  ongoing knee pain. Pain now in the front of the knee and ice helps to alleviate the pain. Naproxen was minimally effective. Scheduled to start PT next month. Diet and Physical Activity  Diet/Nutrition: poor diet. Exercise:  not exercising due to knee pain . Depression Screening  PHQ-2/9 Depression Screening    Little interest or pleasure in doing things: 0 - not at all  Feeling down, depressed, or hopeless: 0 - not at all  PHQ-2 Score: 0  PHQ-2 Interpretation: Negative depression screen       General Health  Sleep:  5 hours. Always feels tired but does take nap . Hearing:  normal but friends have commented on the volume of the TV or radio . Vision: kellee in June  Dental:  was seen a few years ago. Did get dental insurance  . /GYN Health  Last menstrual period: started the 22nd  Contraceptive method:  none  . History of STDs?: no.    Advanced Care Planning  Do you have an advanced directive? no  Do you have a durable medical power of ?  no     Review of Systems:     Review of Systems   Past Medical History:     Past Medical History:   Diagnosis Date   • Abnormal Pap smear of cervix    • Hidradenitis     gali buttocks and axilla - open areas - draining intermittently   • History of COVID-19     12/21/21- no hospitalization   • HPV in female    • Wears glasses       Past Surgical History:     Past Surgical History:   Procedure Laterality Date   • AXILLARY HIDRADENITIS EXCISION Left 3/15/2022    Procedure: EXCISION HIDRADENITIS AXILLARY;  Surgeon: Danielle Napier MD;  Location: [facility];  Service: General   • AXILLARY HIDRADENITIS EXCISION Right 9/2/2022    Procedure: EXCISION HIDRADENITIS AXILLARY;  Surgeon: Danielle Napier MD;  Location: [facility];  Service: General   • SKIN BIOPSY     • WOUND DEBRIDEMENT Bilateral 2/11/2022    Procedure: EXCISIONAL DEBRIDEMENT of bilateral buttocks;   Surgeon: Danielle Napier MD;  Location: [facility];  Service: General      Social History:     Social History     Socioeconomic History   • Marital status: Single     Spouse name: None   • Number of children: None   • Years of education: None   • Highest education level: Some college, no degree   Occupational History   • None   Tobacco Use   • Smoking status: Some Days     Types: Cigars   • Smokeless tobacco: Never   Vaping Use   • Vaping Use: Never used   Substance and Sexual Activity   • Alcohol use: Yes     Comment: socially   • Drug use: Yes     Frequency: 7.0 times per week     Types: Marijuana   • Sexual activity: Yes     Partners: Female   Other Topics Concern   • None   Social History Narrative   • None     Social Determinants of Health     Financial Resource Strain: Not on file   Food Insecurity: Not on file   Transportation Needs: Not on file   Physical Activity: Not on file   Stress: Not on file   Social Connections: Not on file   Intimate Partner Violence: Not on file   Housing Stability: Not on file      Family History:     Family History   Problem Relation Age of Onset   • Endocrine tumor Mother         pituitary   • Alcohol abuse Father    • ADD / ADHD Father    • Asthma Brother    • Diabetes Maternal Grandmother    • Hypertension Maternal Grandmother    • Diabetes Paternal Grandmother       Current Medications:     Current Outpatient Medications   Medication Sig Dispense Refill   • naproxen (Naprosyn) 500 mg tablet Take 1 tablet (500 mg total) by mouth 2 (two) times a day with meals for 7 days 14 tablet 0     No current facility-administered medications for this visit. Allergies: Allergies   Allergen Reactions   • Oxycodone-Acetaminophen Drowsiness   • Percolone [Oxycodone] GI Intolerance      Physical Exam:     /57   Pulse 84   Temp (!) 96.3 °F (35.7 °C)   Resp 16   Ht 5' 5.2" (1.656 m)   Wt 80.1 kg (176 lb 9.6 oz)   SpO2 98%   BMI 29.21 kg/m²     Physical Exam  Constitutional:       Appearance: Normal appearance. HENT:      Head: Normocephalic and atraumatic. Right Ear: Tympanic membrane normal.      Left Ear: Tympanic membrane normal.      Mouth/Throat:      Mouth: Mucous membranes are moist.   Eyes:      Extraocular Movements: Extraocular movements intact. Cardiovascular:      Rate and Rhythm: Normal rate and regular rhythm. Heart sounds: No murmur heard. Abdominal:      General: There is no distension. Palpations: Abdomen is soft. There is no mass. Tenderness: There is no abdominal tenderness. There is no guarding or rebound. Hernia: No hernia is present. Musculoskeletal:         General: Tenderness (anterior knee along the patella tendon) present. No swelling. Right knee: No swelling. Decreased range of motion. Tenderness present over the patellar tendon. Normal alignment and normal meniscus. Neurological:      Mental Status: She is alert and oriented to person, place, and time. Gait: Gait abnormal (limping due to right knee pain). Psychiatric:         Mood and Affect: Mood normal.         Behavior: Behavior normal.         Thought Content:  Thought content normal.          Leida Galeazzi, MD   [address] [place] [address]

## 2023-11-03 ENCOUNTER — EVALUATION (OUTPATIENT)
Dept: PHYSICAL THERAPY | Facility: REHABILITATION | Age: 28
End: 2023-11-03
Payer: COMMERCIAL

## 2023-11-03 DIAGNOSIS — M25.512 ACUTE PAIN OF BOTH SHOULDERS: ICD-10-CM

## 2023-11-03 DIAGNOSIS — M25.561 ACUTE PAIN OF RIGHT KNEE: Primary | ICD-10-CM

## 2023-11-03 DIAGNOSIS — M25.511 ACUTE PAIN OF BOTH SHOULDERS: ICD-10-CM

## 2023-11-03 DIAGNOSIS — M54.12 CERVICAL RADICULOPATHY: ICD-10-CM

## 2023-11-03 PROCEDURE — 97161 PT EVAL LOW COMPLEX 20 MIN: CPT

## 2023-11-03 PROCEDURE — 97112 NEUROMUSCULAR REEDUCATION: CPT

## 2023-11-03 PROCEDURE — 97110 THERAPEUTIC EXERCISES: CPT

## 2023-11-03 NOTE — PROGRESS NOTES
PT Evaluation     Today's date: 11/3/2023  Patient name: Curtis Lentz  : 1995  MRN: [id number]  Referring provider: Wilfred West MD  Dx:   Encounter Diagnosis     ICD-10-CM    1. Acute pain of right knee  M25.561 Ambulatory Referral to Physical Therapy    Subacute pain. Was treated w/ NSAIDS x 1 week but provided minimal relief. Anterior knee pain poss d/t patellofemoral. PT scheduled next week. Ice and compressi      2. Cervical radiculopathy  M54.12 Ambulatory Referral to Physical Therapy      3. Acute pain of both shoulders  M25.511 Ambulatory Referral to Physical Therapy    M25.512           Start Time: 6163  Stop Time: 1435  Total time in clinic (min): 50 minutes    Assessment  Assessment details: Curtis Lentz is a 34 y.o. female referred to physical therapy for acute pain of R knee. Primary impairments include increased pain with functional activities, decreased RLE strength, R knee motor control dysfunction, and impaired static/dynamic balance which is limiting her ability to perform ADLs and recreational activities without pain or functional restrictions. Pt presented with positive meniscus special tests but presented with s/s more consistent with a patellofemoral type pain as displayed by her location of pain and difficulty with knee flexion movements. Pt was provided with a basic HEP which will be reviewed in the upcoming session. Pt was educated on anatomy and physiology of diagnosis and demonstrated verbal understanding. Pt would benefit from skilled PT interventions to increase functional lower extremity strength, increase pain free ROM, improve dynamic knee stability, and facilitate return to recreational activities and ADL management/independence with less limitations and pain. 1:1 with Yuly Duff DPT entirety of tx.   Impairments: abnormal gait, abnormal or restricted ROM, activity intolerance, impaired physical strength, lacks appropriate home exercise program, pain with function, poor posture  and poor body mechanics    Symptom irritability: moderateBarriers to therapy: None  Understanding of Dx/Px/POC: excellent   Prognosis: good    Goals  Short term goals:   STG"s to be met in 3 weeks:   1. Decrease pain by 50% or more during functional movements to increase QoL. 2.  Knee AROM to 0-130 degrees to improve standing and transfers abilities. 3.  Improve R knee and hip strength by half grade or more to improve functional LE strength. 4.  Basic HEP, independent. Long term goals:  LTG's to be met by DC:  1. Ambulate community distances with little to no difficulty. 2.  All transfers with little to no difficulty. 3.  Knee ROM to [facility] to improve functional ROM and QoL. 4.  Increase strength to 5/5 in RLE to decrease risk for re-injury. 5.  Independent with advanced HEP. 6.  Increase FOTO to that of predicted value or better. Plan  Patient would benefit from: skilled physical therapy  Referral necessary: No  Planned therapy interventions: abdominal trunk stabilization, balance/weight bearing training, body mechanics training, functional ROM exercises, home exercise program, gait training, joint mobilization, manual therapy, massage, neuromuscular re-education, patient education, postural training, strengthening, therapeutic activities, therapeutic exercise, graded activity, graded exercise and stretching  Frequency: 2x week  Duration in weeks: 10  Plan of Care beginning date: 11/3/2023  Plan of Care expiration date: 1/12/2024  Treatment plan discussed with: patient      Subjective  HPI: Pt referred to physical therapy for acute pain of R knee. Pt notes that she "fell straight on her knee in late September" and noted no increase in pain. She describes the pain as a "pulling and tight" sensation and has problems with walking, doing steps, and keeping her knee flexed for a long period of time.  She notes some radiating pain into the medial aspect of her R foot and at the shin. She mentioned that her knee can swell up throughout the day and feels worse after work. She works at a [employer] with [employer]. Pain Location: R knee  Pain Intensity: Current: 3-4/10, Worst: 8/10, Best: 3/10  HENNA: Fall injury, insidious  DOI: Late September 2023  Aggravating Factors: Walking for long periods of time, sitting with her knee flexed, steps  Alleviating Factors: Sitting with her knee extended, rest, ice, medication  Living Situation: Flight of steps with railing  Dominant Leg: RLE  Goals: To have less pain and be able to go back to the gym  PLOF: [facility]  Objective          Standing Posture & Lower Extremity Alignment:  Structure/Joint         Pelvis (Tilt)  Anterior X Neutral  Posterior   Iliac Crests  Right Elevated X Neutral  Left Elevated   Knee - Frontal   Genuvalgum X Neutral  Genuvarum   Knee - Sagittal  Genurecurvatum X Neutral     Rearfoot  Valgus X Neutral  Varus   Forefoot  Abducted X Neutral     Arch  Pes Planus X Neutral  Pes Cavus     Range of Motion: Goniometric revealed the following findings (in degrees). Joint Motion Right: 11/3/2023 Left: 11/3/2023   Hip External Rotation 65 65   Hip Internal Rotation 25 25   Knee Extension 0 0   Knee Flexion PROM: 115 / AROM: 100 130     Strength: MMT revealed the following findings.   Joint Motion Right: 11/3/2023 Left: 11/3/2023   Hip Flexion 3+/5 4/5   Hip Abduction 3+/5 4/5   Hip Adduction 4/5 4/5   Hip Extension 4/5 4/5   Knee Extension 3+/5 4/5   Knee Flexion 3+/5 4/5   Ankle Plantarflexion 4/5 4/5   Ankle Dorsiflexion 4/5 4/5     Light Touch Sensation:  RLE - 5/5  LLE - 5/5    Deep Tendon Reflexes:  Patellar Reflex - normal  Ankle Reflex - normal    Additional Assessments:  Observation: Slight increase in swelling at the R knee  Palpation: Pain with palpation at the patellar tendon  Joint Mobility: Decreased R knee flexion  Gait Pattern:  Decreased Stance time on RLE, decreased knee flexion during swing phase, decreased terminal knee extension at push off  Balance:   SLS - R= 5 secs / L = 10 secs    Functional Strength/Mobility Exam:   Functional Squat: Increased weight shift towards the LLE, increased pain at the RLE  Lateral Step Up Eccentric: Unable to perform at R knee d/t pain      Special Tests:    Knee Tests (Structure evaluated) Date: 11/3/2023  ( P / N )   Lachman (ACL) N BL   Anterior (ACL) N BL   Posterior Drawer (PCL) N BL   Sag (PCL) N BL   Valgus Stress (MCL) N BL   Varus Stress (LCL) N BL   Apley Compression (Menisci) P RLE   Maurizio (Menisci) P RLE   Thessaly (Menisci) P RLE     Access Code: [email or web address]  URL: [email or web address]  Date: 11/03/2023  Prepared by: Cierrayrene Rife    Exercises  - Hip Flexion  - 1 x daily - 7 x weekly - 2 sets - 10 reps  - Supine Active Straight Leg Raise  - 1 x daily - 7 x weekly - 2 sets - 10 reps  - Sidelying Hip Abduction  - 1 x daily - 7 x weekly - 2 sets - 10 reps  - Seated Heel Slide  - 1 x daily - 7 x weekly - 1 sets - 10 reps - 5 seconds hold  - Squat with Chair Touch  - 1 x daily - 7 x weekly - 3 sets - 10 reps     Precautions: Hx of shoulder pain    POC expires Unit limit Auth Expiration date PT/OT + Visit Limit?    N/a BOMN N/a 25 PCY         Visit/Unit Tracking  AUTH Status:  Date 11/3        N/a Used 1         Remaining             Pertinent Findings:      POC End Date: Pending                                                                                          Test / Measure  11/3/2023   FOTO (Predicted 70) 42   R Knee ROM 0-100 AROM   R Knee Strength 3+ to 4/5     Appointment Number:  1            Manuals 11/3            R Knee PROM             Sitting R Mobilization                                                    Neuro Re-Ed             TA Recruitment + Bridge             SLR  10x            SL Abduction 10x            Supine Heel Slides 10x            Sitting Heel Flex Stretch 5x            SLS                          Ther Ex             HEP Review + Pt Edu 10 min Recumbent Bike             STS 10x            Modified DL             Leg Press             Standing Hip Abd/ext             Side Steps w/ TB             Nicolle Fwd/Bwd Ambulation             Suitcase Carry                                       Ther Activity             Fwd Step Ups             Lateral Step Up             Gait Training                                       Modalities

## 2023-11-08 ENCOUNTER — OFFICE VISIT (OUTPATIENT)
Dept: PHYSICAL THERAPY | Facility: REHABILITATION | Age: 28
End: 2023-11-08
Payer: COMMERCIAL

## 2023-11-08 DIAGNOSIS — M25.512 ACUTE PAIN OF BOTH SHOULDERS: ICD-10-CM

## 2023-11-08 DIAGNOSIS — M25.561 ACUTE PAIN OF RIGHT KNEE: Primary | ICD-10-CM

## 2023-11-08 DIAGNOSIS — M54.12 CERVICAL RADICULOPATHY: ICD-10-CM

## 2023-11-08 DIAGNOSIS — M25.511 ACUTE PAIN OF BOTH SHOULDERS: ICD-10-CM

## 2023-11-08 PROCEDURE — 97110 THERAPEUTIC EXERCISES: CPT

## 2023-11-08 PROCEDURE — 97112 NEUROMUSCULAR REEDUCATION: CPT

## 2023-11-08 PROCEDURE — 97530 THERAPEUTIC ACTIVITIES: CPT

## 2023-11-08 NOTE — PROGRESS NOTES
Daily Note     Today's date: 2023  Patient name: Hannah Nassar  : 1995  MRN: [id number]  Referring provider: Marine Bridges MD  Dx:   Encounter Diagnosis     ICD-10-CM    1. Acute pain of right knee  M25.561       2. Acute pain of both shoulders  M25.511     M25.512       3. Cervical radiculopathy  M54.12           Start Time: 4182  Stop Time: 1735  Total time in clinic (min): 45 minutes    Subjective: Pt notes that her knee has been feeling slightly better as she feels she has been walking better and has better ROM at her knee. She continues to have pain with stepping motions and squatting. Objective: See treatment diary below      Assessment: Tolerated treatment well. Patient would benefit from continued PT. Pt was introduced to further functional LE strength exercises and responded well without excessive increase in soreness or pain. She was most challenged with lateral step ups and side steps this visit due to slight increase in pain at the R knee. She continues to show compensatory patterns at the R knee during step ups with decreased neuromuscular control and quad/posterior chain activation. Continue to progress as tolerated, assess response to tx session next visit. 1:1 with Lauri Zarco DPT entirety of tx. Plan: Continue per plan of care. Precautions: Hx of shoulder pain    POC expires Unit limit Auth Expiration date PT/OT + Visit Limit?    N/a BOMN 23 25 PCY         Visit/Unit Tracking  AUTH Status:  Date 11/3 11/8       N/a Used 1 2        Remaining  24 23          Pertinent Findings:      POC End Date: Pending                                                                                          Test / Measure  11/3/2023   FOTO (Predicted 70) 42   R Knee ROM 0-100 AROM   R Knee Strength 3+ to 4/5     Appointment Number:  1 2           Manuals 11/3 11/8           R Knee PROM  MC           Sitting R Mobilization Neuro Re-Ed             TA Recruitment + Bridge  20x           SLR  10x 2x10, 2.5#           SL Abduction 10x 2x10, 2.5#           Supine Heel Slides 10x            Sitting Heel Flex Stretch 5x            SLS  On ariex, 3x10s hold                        Ther Ex             HEP Review + Pt Edu 10 min 5 min           Recumbent Bike  5 min, lvl 2           STS 10x 2x10, @ bar           Modified DL             Leg Press  Staggered, 2x8 55#           Lunges  Rle Front, 2x8           Standing Hip Abd/ext             Side Steps w/ TB  BTB at midfoot, 3 laps           Nicolle Fwd/Bwd Ambulation             Suitcase Carry                                       Ther Activity             Fwd Step Ups  2x10           Lateral Step Up  2x5 RLE           Gait Training                                       Modalities

## 2023-11-15 ENCOUNTER — OFFICE VISIT (OUTPATIENT)
Dept: PHYSICAL THERAPY | Facility: REHABILITATION | Age: 28
End: 2023-11-15
Payer: COMMERCIAL

## 2023-11-15 DIAGNOSIS — M25.512 ACUTE PAIN OF BOTH SHOULDERS: ICD-10-CM

## 2023-11-15 DIAGNOSIS — M25.561 ACUTE PAIN OF RIGHT KNEE: Primary | ICD-10-CM

## 2023-11-15 DIAGNOSIS — M54.12 CERVICAL RADICULOPATHY: ICD-10-CM

## 2023-11-15 DIAGNOSIS — M25.511 ACUTE PAIN OF BOTH SHOULDERS: ICD-10-CM

## 2023-11-15 PROCEDURE — 97530 THERAPEUTIC ACTIVITIES: CPT

## 2023-11-15 PROCEDURE — 97110 THERAPEUTIC EXERCISES: CPT

## 2023-11-15 PROCEDURE — 97112 NEUROMUSCULAR REEDUCATION: CPT

## 2023-11-15 NOTE — PROGRESS NOTES
Daily Note     Today's date: 11/15/2023  Patient name: Katherine Rodriguez  : 1995  MRN: [id number]  Referring provider: David Sanders MD  Dx:   Encounter Diagnosis     ICD-10-CM    1. Acute pain of right knee  M25.561       2. Acute pain of both shoulders  M25.511     M25.512       3. Cervical radiculopathy  M54.12           Start Time: 1400  Stop Time: 1440  Total time in clinic (min): 40 minutes    Subjective: Pt notes that her knee feels "tight" today especially above her patella. She mentioned that she feels she has been walking better and has tried to do her exercise plan more frequently this past week. Objective: See treatment diary below      Assessment: Tolerated treatment well. Patient would benefit from continued PT. Pt responded well to increased resistance with STS and leg press which shows improving functional LE strength. She continues to be most challenged with step ups to increased step height as she was unable to do so to a 9" step without increased pain. She has been showing improved tolerance to loading as technique with single leg lateral step ups has been improving. She continues to respond well to Vcs for quad activation and posterior chain recruitment during dynamic LE movements and shows improved technique with cueing. 1:1 with Mehdi Hillman DPT entirety of tx. Plan: Continue per plan of care. Precautions: Hx of shoulder pain    POC expires Unit limit Auth Expiration date PT/OT + Visit Limit?    N/a BOMN 23 25 PCY         Visit/Unit Tracking  AUTH Status:  Date 11/3 11/8 11/15      N/a Used 1 2 3       Remaining  24 23 22         Pertinent Findings:      POC End Date: Pending                                                                                          Test / Measure  11/3/2023   FOTO (Predicted 70) 42   R Knee ROM 0-100 AROM   R Knee Strength 3+ to 4/5     Appointment Number:  1 2 3          Manuals 11/3 11/8 11/15          R Knee PROM  MC Sitting R Mobilization                                                    Neuro Re-Ed             TA Recruitment + Bridge  20x 10x, 2x8 Single          SLR  10x 2x10, 2.5# 2x10, 3#          SL Abduction 10x 2x10, 2.5# 3x10, 3#          Supine Heel Slides 10x            Sitting Heel Flex Stretch 5x            SLS  On ariex, 3x10s hold                        Ther Ex             HEP Review + Pt Edu 10 min 5 min           Recumbent Bike  5 min, lvl 2 5 min, lvl 2                       STS 10x 2x10, @ bar 3x7, 20#          Modified DL             Leg Press  Staggered, 2x8 55# Staggered, 2x8 55#          Lunges  Rle Front, 2x8 Rle Front, 2x8          Standing Hip Abd/ext             Side Steps w/ TB  BTB at midfoot, 3 laps BTB at midfoot, 3 laps          [place]ilton Fwd/Bwd Ambulation             Suitcase Carry                                       Ther Activity             Fwd Step Ups  2x10 3x6, Unilateral          Lateral Step Up  2x5 RLE 2x5 RLE          Gait Training                                       Modalities

## 2023-11-16 ENCOUNTER — PATIENT MESSAGE (OUTPATIENT)
Dept: FAMILY MEDICINE CLINIC | Facility: CLINIC | Age: 28
End: 2023-11-16

## 2023-11-16 DIAGNOSIS — M25.511 ACUTE PAIN OF BOTH SHOULDERS: ICD-10-CM

## 2023-11-16 DIAGNOSIS — M25.512 ACUTE PAIN OF BOTH SHOULDERS: ICD-10-CM

## 2023-11-16 DIAGNOSIS — M54.12 CERVICAL RADICULOPATHY: Primary | ICD-10-CM

## 2023-11-17 ENCOUNTER — OFFICE VISIT (OUTPATIENT)
Dept: PHYSICAL THERAPY | Facility: REHABILITATION | Age: 28
End: 2023-11-17
Payer: COMMERCIAL

## 2023-11-17 DIAGNOSIS — M25.561 ACUTE PAIN OF RIGHT KNEE: Primary | ICD-10-CM

## 2023-11-17 DIAGNOSIS — M54.12 CERVICAL RADICULOPATHY: ICD-10-CM

## 2023-11-17 DIAGNOSIS — M25.511 ACUTE PAIN OF BOTH SHOULDERS: ICD-10-CM

## 2023-11-17 DIAGNOSIS — M25.512 ACUTE PAIN OF BOTH SHOULDERS: ICD-10-CM

## 2023-11-17 PROCEDURE — 97112 NEUROMUSCULAR REEDUCATION: CPT

## 2023-11-17 PROCEDURE — 97110 THERAPEUTIC EXERCISES: CPT

## 2023-11-17 PROCEDURE — 97530 THERAPEUTIC ACTIVITIES: CPT

## 2023-11-17 RX ORDER — METHYLPREDNISOLONE 4 MG/1
TABLET ORAL
Qty: 21 EACH | Refills: 0 | Status: SHIPPED | OUTPATIENT
Start: 2023-11-17

## 2023-11-17 NOTE — PROGRESS NOTES
Daily Note     Today's date: 2023  Patient name: Jill Loomis  : 1995  MRN: [id number]  Referring provider: Aaron Sky MD  Dx:   Encounter Diagnosis     ICD-10-CM    1. Acute pain of right knee  M25.561       2. Acute pain of both shoulders  M25.511     M25.512       3. Cervical radiculopathy  M54.12           Start Time: 1410  Stop Time: 1500  Total time in clinic (min): 50 minutes    Subjective: Pt notes that she is feeling tired today as she went to bed late last night. She mentioned that she had a sharp pain in her L knee the other day but this went away with rest.       Objective: See treatment diary below      Assessment: Tolerated treatment well. Patient would benefit from continued PT. Pt was introduced to KB DL to improve loading tolerance and strength at the R knee and responded well without excessive increase in pain or soreness. She shows improving tolerance to STS but continue to have pain at end range knee flexion especially during lunges with RLE behind. She continues to benefit from Vcs for quad activation and knee positioning during dynamic LE movements and shows improved technique with cueing. 1:1 with Timothy Ramirez DPT entirety of tx. Plan: Continue per plan of care. Precautions: Hx of shoulder pain    POC expires Unit limit Auth Expiration date PT/OT + Visit Limit?    N/a BOMN 23 25 PCY         Visit/Unit Tracking  AUTH Status:  Date 11/3 11/8 11/15 11/17     Approved Used 1 2 3 4      Remaining  24 23 22 21        Pertinent Findings:      POC End Date: Pending                                                                                          Test / Measure  11/3/2023   FOTO (Predicted 70) 42   R Knee ROM 0-100 AROM   R Knee Strength 3+ to 4/5     Appointment Number:  1 2 3 4         Manuals 11/3 11/8 11/15 11/17         R Knee PROM  MC           Sitting R Mobilization                                                    Neuro Re-Ed             TA Recruitment + Bridge  20x 10x, 2x8 Single 10x, 2x8 Single         SLR  10x 2x10, 2.5# 2x10, 3# 2x10, 3#         SL Abduction 10x 2x10, 2.5# 3x10, 3# 3x10, 3#         Supine Heel Slides 10x            Sitting Heel Flex Stretch 5x            SLS  On ariex, 3x10s hold                        Ther Ex             HEP Review + Pt Edu 10 min 5 min           Recumbent Bike  5 min, lvl 2 5 min, lvl 2 5 min, lvl 2                      STS 10x 2x10, @ bar 3x7, 20# 3x7, 20#         Modified DL    3x8, 35#         Leg Press  Staggered, 2x8 55# Staggered, 2x8 55# Staggered, 2x8 55#         Lunges  Rle Front, 2x8 Rle Front, 2x8 RLE Front, 2x8         Standing Hip Abd/ext             Side Steps w/ TB  BTB at midfoot, 3 laps BTB at midfoot, 3 laps BTB at midfoot, 3 laps         [place] Fwd/Bwd Ambulation             Suitcase Carry                                       Ther Activity             Fwd Step Ups  2x10 3x6, Unilateral 3x6, Unilateral, 6"" step         Lateral Step Up  2x5 RLE 2x5 RLE 2x5 RLE 6" step         Gait Training                                       Modalities

## 2023-11-21 ENCOUNTER — OFFICE VISIT (OUTPATIENT)
Dept: PHYSICAL THERAPY | Facility: REHABILITATION | Age: 28
End: 2023-11-21
Payer: COMMERCIAL

## 2023-11-21 DIAGNOSIS — M25.561 ACUTE PAIN OF RIGHT KNEE: Primary | ICD-10-CM

## 2023-11-21 DIAGNOSIS — M54.12 CERVICAL RADICULOPATHY: ICD-10-CM

## 2023-11-21 DIAGNOSIS — M25.512 ACUTE PAIN OF BOTH SHOULDERS: ICD-10-CM

## 2023-11-21 DIAGNOSIS — M25.511 ACUTE PAIN OF BOTH SHOULDERS: ICD-10-CM

## 2023-11-21 PROCEDURE — 97530 THERAPEUTIC ACTIVITIES: CPT

## 2023-11-21 PROCEDURE — 97110 THERAPEUTIC EXERCISES: CPT

## 2023-11-21 PROCEDURE — 97112 NEUROMUSCULAR REEDUCATION: CPT

## 2023-11-21 NOTE — PROGRESS NOTES
Daily Note     Today's date: 2023  Patient name: Dane Bhat  : 1995  MRN: [id number]  Referring provider: Ignacio Marquis MD  Dx:   Encounter Diagnosis     ICD-10-CM    1. Acute pain of right knee  M25.561       2. Acute pain of both shoulders  M25.511     M25.512       3. Cervical radiculopathy  M54.12           Start Time: 1450  Stop Time: 1530  Total time in clinic (min): 40 minutes    Subjective: Pt notes that her knee has been feeling a little bit better as she has been having less pain with going up steps at work. However, she continues to have difficulty with steps as she notes pain at times. She mentioned that she feels her mattress continues to affect her pain as she thinks she needs a new mattress. Objective: See treatment diary below      Assessment: Tolerated treatment well. Patient would benefit from continued PT. Pt tolerated increased resistance with modified KB DL and STS well which demonstrates improving functional LE strength. She continues to be most challenged with lunges especially with R knee posterior as she continues to have increased pain with terminal knee flexion. She shows improving tolerance to single leg step ups and lateral eccentric step ups but continues to respond well to Vcs for eccentric control and quad activation throughout descent. Continue to progress as tolerated, 1:1 with Nehal Verma DPT entirety of tx. Plan: Continue per plan of care. Precautions: Hx of shoulder pain    POC expires Unit limit Auth Expiration date PT/OT + Visit Limit?    N/a BOMN 23 25 PCY         Visit/Unit Tracking  AUTH Status:  Date 11/3 11/8 11/15 11/17 11/21     Approved Used 1 2 3 4 5      Remaining  24 23 22 21 20        Pertinent Findings:      POC End Date: Pending                                                                                          Test / Measure  11/3/2023   FOTO (Predicted 70) 42   R Knee ROM 0-100 AROM   R Knee Strength 3+ to 4/5     Appointment Number:  1 2 3 4 5        Manuals 11/3 11/8 11/15 11/17 11/21        R Knee PROM  MC           Sitting R Mobilization                                                    Neuro Re-Ed             TA Recruitment + Bridge  20x 10x, 2x8 Single 10x, 2x8 Single 10x, 2x8 Single        SLR  10x 2x10, 2.5# 2x10, 3# 2x10, 3# 2x10, 4#        SL Abduction 10x 2x10, 2.5# 3x10, 3# 3x10, 3# 3x10, 4#        Supine Heel Slides 10x    Tball hip flex, 10x        Tball Hip Ext Alternating     2x5        Sitting Heel Flex Stretch 5x            SLS  On ariex, 3x10s hold                        Ther Ex             HEP Review + Pt Edu 10 min 5 min           Recumbent Bike  5 min, lvl 2 5 min, lvl 2 5 min, lvl 2 5 min, lvl 2                     STS 10x 2x10, @ bar 3x7, 20# 3x7, 20# 3x7, 25#        Modified DL    3x8, 35# 3x8, 45#        Leg Press  Staggered, 2x8 55# Staggered, 2x8 55# Staggered, 2x8 55# Staggered, 2x8 55#        Lunges  Rle Front, 2x8 Rle Front, 2x8 RLE Front, 2x8 2x8 ea to 4" step + airex        Standing Hip Abd/ext             Side Steps w/ TB  BTB at midfoot, 3 laps BTB at midfoot, 3 laps BTB at midfoot, 3 laps Standing Fire Hydrant 2x8        [place] Fwd/Bwd Ambulation             Suitcase Carry                                       Ther Activity             Fwd Step Ups  2x10 3x6, Unilateral 3x6, Unilateral, 6"" step 3x6, Unilateral, 6"" step        Lateral Step Up  2x5 RLE 2x5 RLE 2x5 RLE 6" step 2x5 RLE 6" step        Gait Training                                       Modalities

## 2023-11-24 ENCOUNTER — OFFICE VISIT (OUTPATIENT)
Dept: PHYSICAL THERAPY | Facility: REHABILITATION | Age: 28
End: 2023-11-24
Payer: COMMERCIAL

## 2023-11-24 DIAGNOSIS — M54.12 CERVICAL RADICULOPATHY: ICD-10-CM

## 2023-11-24 DIAGNOSIS — M25.561 ACUTE PAIN OF RIGHT KNEE: Primary | ICD-10-CM

## 2023-11-24 DIAGNOSIS — M25.511 ACUTE PAIN OF BOTH SHOULDERS: ICD-10-CM

## 2023-11-24 DIAGNOSIS — M25.512 ACUTE PAIN OF BOTH SHOULDERS: ICD-10-CM

## 2023-11-24 PROCEDURE — 97110 THERAPEUTIC EXERCISES: CPT

## 2023-11-24 PROCEDURE — 97530 THERAPEUTIC ACTIVITIES: CPT

## 2023-11-24 PROCEDURE — 97112 NEUROMUSCULAR REEDUCATION: CPT

## 2023-11-24 NOTE — PROGRESS NOTES
Daily Note     Today's date: 2023  Patient name: Rica Ross  : 1995  MRN: [id number]  Referring provider: Betty Beatty MD  Dx:   Encounter Diagnosis     ICD-10-CM    1. Acute pain of right knee  M25.561       2. Acute pain of both shoulders  M25.511     M25.512       3. Cervical radiculopathy  M54.12           Start Time:   Stop Time:   Total time in clinic (min): 38 minutes    Subjective: Pt notes that she had a tough time sleeping last night due to her knee pain but mentioned that she did not have much trouble walking the other day. Objective: See treatment diary below      Assessment: Tolerated treatment well. Patient would benefit from continued PT. Pt shows improving strength and knee stability at the RLE during eccentric movements but continues to note pain especially at end range knee flexion movements. She was able to tolerate increased resistance during leg press and has been performing eccentric step ups with improved movement quality. Continue to progress as tolerated, 1:1 with Brett Rodriguez DPT entirety of tx. Plan: Continue per plan of care. Precautions: Hx of shoulder pain    POC expires Unit limit Auth Expiration date PT/OT + Visit Limit?    N/a BOMN 23 25 PCY         Visit/Unit Tracking  AUTH Status:  Date 11/3 11/8 11/15 11/17 11/21 11/24    Approved Used 1 2 3 4 5 6     Remaining  24 23 22 21 20 19       Pertinent Findings:      POC End Date: Pending                                                                                          Test / Measure  11/3/2023   FOTO (Predicted 70) 42   R Knee ROM 0-100 AROM   R Knee Strength 3+ to 4/5     Appointment Number:  1 2 3 4 5 6       Manuals 11/3 11/8 11/15 11/17 11/21 11/24       R Knee PROM  MC           Sitting R Mobilization                                                    Neuro Re-Ed             TA Recruitment + Bridge  20x 10x, 2x8 Single 10x, 2x8 Single 10x, 2x8 Single 10x, 2x8 Single SLR  10x 2x10, 2.5# 2x10, 3# 2x10, 3# 2x10, 5# 2x10, 5#       SL Abduction 10x 2x10, 2.5# 3x10, 3# 3x10, 3# 3x10, 5# 3x10, 5#       Supine Heel Slides 10x    Tball hip flex, 10x Tball hip flex, 10x       Tball Hip Ext Alternating     2x5 2x5       Sitting Heel Flex Stretch 5x            SLS  On ariex, 3x10s hold                        Ther Ex             HEP Review + Pt Edu 10 min 5 min           Recumbent Bike  5 min, lvl 2 5 min, lvl 2 5 min, lvl 2 5 min, lvl 2 5 min, lvl 2                    STS 10x 2x10, @ bar 3x7, 20# 3x7, 20# 3x7, 25# 3x8, 25#       Modified DL    3x8, 35# 3x8, 45# 3x8, 45#       Leg Press  Staggered, 2x8 55# Staggered, 2x8 55# Staggered, 2x8 55# Staggered, 2x8 55# Staggered, 3x8, 85#       Lunges  Rle Front, 2x8 Rle Front, 2x8 RLE Front, 2x8 2x8 ea to 4" step + airex 2x8 ea to 4" step + airex       Standing Hip Abd/ext             Side Steps w/ TB  BTB at midfoot, 3 laps BTB at midfoot, 3 laps BTB at midfoot, 3 laps Standing Fire Hydrant 2x8 Standing Fire Hydrant 2x8       [place] Fwd/Bwd Ambulation             Suitcase Carry                                       Ther Activity             Fwd Step Ups  2x10 3x6, Unilateral 3x6, Unilateral, 6"" step 3x6, Unilateral, 6"" step 2x8, Unilateral, 6"" step       Lateral Step Up  2x5 RLE 2x5 RLE 2x5 RLE 6" step 2x5 RLE 6" step 2x5 RLE 6" step       Gait Training                                       Modalities

## 2023-11-28 ENCOUNTER — OFFICE VISIT (OUTPATIENT)
Dept: PHYSICAL THERAPY | Facility: REHABILITATION | Age: 28
End: 2023-11-28
Payer: COMMERCIAL

## 2023-11-28 DIAGNOSIS — M25.512 ACUTE PAIN OF BOTH SHOULDERS: ICD-10-CM

## 2023-11-28 DIAGNOSIS — M25.561 ACUTE PAIN OF RIGHT KNEE: Primary | ICD-10-CM

## 2023-11-28 DIAGNOSIS — M54.12 CERVICAL RADICULOPATHY: ICD-10-CM

## 2023-11-28 DIAGNOSIS — M25.511 ACUTE PAIN OF BOTH SHOULDERS: ICD-10-CM

## 2023-11-28 PROCEDURE — 97110 THERAPEUTIC EXERCISES: CPT

## 2023-11-28 PROCEDURE — 97530 THERAPEUTIC ACTIVITIES: CPT

## 2023-11-28 PROCEDURE — 97112 NEUROMUSCULAR REEDUCATION: CPT

## 2023-11-28 NOTE — PROGRESS NOTES
Daily Note     Today's date: 2023  Patient name: Tracy Reid  : 1995  MRN: [id number]  Referring provider: Precious Koyanagi, MD  Dx:   Encounter Diagnosis     ICD-10-CM    1. Acute pain of right knee  M25.561       2. Acute pain of both shoulders  M25.511     M25.512       3. Cervical radiculopathy  M54.12           Start Time: 1400  Stop Time: 1445  Total time in clinic (min): 45 minutes    Subjective: Pt mentioned that her knee continues to feel slightly sore especially with steps but her knee has been feeling better in terms of pain. She notes that her shoulder continues to bother her and would like to be evaluated for her neck and shoulders next visit. Objective: See treatment diary below      Assessment: Tolerated treatment well. Patient would benefit from continued PT. Pt was able to perform increased volume with STS which shows improving functional LE endurance. She continues to be challenged with single leg eccentric step ups as quad strength and neuromuscular control continues to be limited. She responded well to Vcs for knee positioning and quad activation during single leg movements and shows improved technique with cueing. Next visit, evaluate pt for her neck/shoulder pain. Continue to progress as tolerated, 1:1 with Shannan Escoto DPT entirety of tx. Plan: Continue per plan of care. Precautions: Hx of shoulder pain    POC expires Unit limit Auth Expiration date PT/OT + Visit Limit?    N/a BOMN 23 25 PCY         Visit/Unit Tracking  AUTH Status:  Date 11/3 11/8 11/15 11/17 11/21 11/24 11/28   Approved Used 1 2 3 4 5 6 7    Remaining  24 23 22 21 20 19 18      Pertinent Findings:      POC End Date: Pending                                                                                          Test / Measure  11/3/2023   FOTO (Predicted 70) 42   R Knee ROM 0-100 AROM   R Knee Strength 3+ to 4/5     Appointment Number:  1 2 3 4 5 6 7      Manuals 11/3 11/8 11/15 11/17 11/21 11/24 11/28      R Knee PROM  MC           Sitting R Mobilization                                                    Neuro Re-Ed             TA Recruitment + Bridge  20x 10x, 2x8 Single 10x, 2x8 Single 10x, 2x8 Single 10x, 2x8 Single 10x, 2x8 Single      SLR  10x 2x10, 2.5# 2x10, 3# 2x10, 3# 2x10, 5# 2x10, 5# 2x10, 5#      SL Abduction 10x 2x10, 2.5# 3x10, 3# 3x10, 3# 3x10, 5# 3x10, 5# 3x10, 5#      Supine Heel Slides 10x    Tball hip flex, 10x Tball hip flex, 10x Tball hip flex, 10x      Tball Hip Ext Alternating     2x5 2x5 2x5      Sitting Heel Flex Stretch 5x            SLS  On ariex, 3x10s hold                        Ther Ex             HEP Review + Pt Edu 10 min 5 min           Recumbent Bike  5 min, lvl 2 5 min, lvl 2 5 min, lvl 2 5 min, lvl 2 5 min, lvl 2 5 min, lvl 2                   STS 10x 2x10, @ bar 3x7, 20# 3x7, 20# 3x7, 25# 3x8, 25# 3x8, 25#      Modified DL    3x8, 35# 3x8, 45# 3x8, 45# 3x8, 45#      Leg Press  Staggered, 2x8 55# Staggered, 2x8 55# Staggered, 2x8 55# Staggered, 2x8 55# Staggered, 3x8, 85# Staggered, 3x8, 85#      Lunges  Rle Front, 2x8 Rle Front, 2x8 RLE Front, 2x8 2x8 ea to 4" step + airex 2x8 ea to 4" step + airex [place]      Standing Hip Abd/ext             Side Steps w/ TB  BTB at midfoot, 3 laps BTB at midfoot, 3 laps BTB at midfoot, 3 laps Standing Fire Hydrant 2x8 Standing Fire Hydrant 2x8 Standing Fire Hydrant 2x8      [place] Fwd/Bwd Ambulation             Suitcase Carry                                       Ther Activity             Fwd Step Ups  2x10 3x6, Unilateral 3x6, Unilateral, 6"" step 3x6, Unilateral, 6"" step 2x8, Unilateral, 6"" step 2x8, Unilateral, 6"" step      Lateral Step Up  2x5 RLE 2x5 RLE 2x5 RLE 6" step 2x5 RLE 6" step 2x5 RLE 6" step 2x8 RLE 6" step      Gait Training                                       Modalities

## 2023-12-06 ENCOUNTER — OFFICE VISIT (OUTPATIENT)
Dept: PHYSICAL THERAPY | Facility: REHABILITATION | Age: 28
End: 2023-12-06
Payer: COMMERCIAL

## 2023-12-06 DIAGNOSIS — M25.561 ACUTE PAIN OF RIGHT KNEE: Primary | ICD-10-CM

## 2023-12-06 DIAGNOSIS — M25.512 ACUTE PAIN OF BOTH SHOULDERS: ICD-10-CM

## 2023-12-06 DIAGNOSIS — M54.12 CERVICAL RADICULOPATHY: ICD-10-CM

## 2023-12-06 DIAGNOSIS — M25.511 ACUTE PAIN OF BOTH SHOULDERS: ICD-10-CM

## 2023-12-06 PROCEDURE — 97530 THERAPEUTIC ACTIVITIES: CPT

## 2023-12-06 PROCEDURE — 97110 THERAPEUTIC EXERCISES: CPT

## 2023-12-06 PROCEDURE — 97112 NEUROMUSCULAR REEDUCATION: CPT

## 2023-12-06 NOTE — PROGRESS NOTES
Daily Note     Today's date: 2023  Patient name: Rica Ross  : 1995  MRN: [id number]  Referring provider: Betty Beatty MD  Dx:   Encounter Diagnosis     ICD-10-CM    1. Acute pain of right knee  M25.561       2. Cervical radiculopathy  M54.12       3. Acute pain of both shoulders  M25.511     M25.512           Start Time: 1451  Stop Time: 1535  Total time in clinic (min): 44 minutes    Subjective: Pt notes that her knee pain has been improving as she has been having less difficulty with navigating steps. She mentioned that she continues to have pain with lunges at the R knee especially with putting her R knee on the floor. Objective: See treatment diary below      Assessment: Tolerated treatment well. Patient would benefit from continued PT. Pt was introduced to increased resistance with steps ups and responded well without excessive increase in pain or soreness. She was also able to tolerate decreased step height during lunges which is an improvement, but she continues to note increased pain at end range knee flexion at the R knee. Verbal cues for knee positioning and neuromuscular control of the quads and glutes continue to be helpful during single leg eccentric exercises as she displays improved technique following. 1:1 with Brett Rodriguez DPT entirety of tx. Plan: Continue per plan of care. Precautions: Hx of shoulder pain    POC expires Unit limit Auth Expiration date PT/OT + Visit Limit?    N/a BOMN 23 25 PCY         Visit/Unit Tracking  AUTH Status:  Date 11/3 11/8 11/15 11/17 11/21 11/24 11/28 12/6   Approved Used 1 2 3 4 5 6 7 8    Remaining  24 23 22 21 20 19 18 17      Pertinent Findings:      POC End Date: Pending                                                                                          Test / Measure  11/3/2023   FOTO (Predicted 70) 42   R Knee ROM 0-100 AROM   R Knee Strength 3+ to 4/5     Appointment Number:  1 2 3 4 5 6 7 8     Manuals 11/3 11/8 11/15 11/17 11/21 11/24 11/28 12/6     R Knee PROM  MC           Sitting R Mobilization                                                    Neuro Re-Ed             TA Recruitment + Bridge  20x 10x, 2x8 Single 10x, 2x8 Single 10x, 2x8 Single 10x, 2x8 Single 10x, 2x8 Single 10x, 2x8 Single     SLR  10x 2x10, 2.5# 2x10, 3# 2x10, 3# 2x10, 5# 2x10, 5# 2x10, 5#      SL Abduction 10x 2x10, 2.5# 3x10, 3# 3x10, 3# 3x10, 5# 3x10, 5# 3x10, 5#      Supine Heel Slides 10x    Tball hip flex, 10x Tball hip flex, 10x Tball hip flex, 10x Tball hip flex, 10x     Tball Hip Ext Alternating     2x5 2x5 2x5 2x5     Sitting Heel Flex Stretch 5x            SLS  On ariex, 3x10s hold                        Ther Ex             HEP Review + Pt Edu 10 min 5 min           Recumbent Bike  5 min, lvl 2 5 min, lvl 2 5 min, lvl 2 5 min, lvl 2 5 min, lvl 2 5 min, lvl 2 5 min, lvl 2                  STS 10x 2x10, @ bar 3x7, 20# 3x7, 20# 3x7, 25# 3x8, 25# 3x8, 25# 3x8, 25#     Modified DL    3x8, 35# 3x8, 45# 3x8, 45# 3x8, 45# 3x8, 45#     Leg Press  Staggered, 2x8 55# Staggered, 2x8 55# Staggered, 2x8 55# Staggered, 2x8 55# Staggered, 3x8, 85# Staggered, 3x8, 85# Staggered, 3x8, 85#     Lunges  Rle Front, 2x8 Rle Front, 2x8 RLE Front, 2x8 2x8 ea to 4" step + airex 2x8 ea to 4" step + airex [place] 2x8 to foam, 15# with RLE in front     Standing Hip Abd/ext             Side Steps w/ TB  BTB at midfoot, 3 laps BTB at midfoot, 3 laps BTB at midfoot, 3 laps Standing Fire Hydrant 2x8 Standing Fire Hydrant 2x8 Standing Fire Hydrant 2x8 Standing Fire Hydrant 2x8 BTB     [place] Fwd/Bwd Ambulation             Suitcase Carry                                       Ther Activity             Fwd Step Ups  2x10 3x6, Unilateral 3x6, Unilateral, 6"" step 3x6, Unilateral, 6"" step 2x8, Unilateral, 6"" step 2x8, Unilateral, 6"" step 2x8, Unilateral, 6" step w/ 15#     Lateral Step Up  2x5 RLE 2x5 RLE 2x5 RLE 6" step 2x5 RLE 6" step 2x5 RLE 6" step 2x8 RLE 6" step 2x8 RLE 6" step     Gait Training                                       Modalities

## 2023-12-08 ENCOUNTER — EVALUATION (OUTPATIENT)
Dept: PHYSICAL THERAPY | Facility: REHABILITATION | Age: 28
End: 2023-12-08
Payer: COMMERCIAL

## 2023-12-08 DIAGNOSIS — M54.12 CERVICAL RADICULOPATHY: ICD-10-CM

## 2023-12-08 DIAGNOSIS — M25.511 ACUTE PAIN OF BOTH SHOULDERS: ICD-10-CM

## 2023-12-08 DIAGNOSIS — M25.561 ACUTE PAIN OF RIGHT KNEE: Primary | ICD-10-CM

## 2023-12-08 DIAGNOSIS — M25.512 ACUTE PAIN OF BOTH SHOULDERS: ICD-10-CM

## 2023-12-08 PROCEDURE — 97112 NEUROMUSCULAR REEDUCATION: CPT

## 2023-12-08 PROCEDURE — 97110 THERAPEUTIC EXERCISES: CPT

## 2023-12-08 PROCEDURE — 97164 PT RE-EVAL EST PLAN CARE: CPT

## 2023-12-08 NOTE — PROGRESS NOTES
PT Re-Evaluation    Today's date: 2023  Patient name: Ludwin Simpson  : 1995  MRN: [id number]  Referring provider: Mary Anne Thayer MD  Dx:   Encounter Diagnosis     ICD-10-CM    1. Acute pain of right knee  M25.561       2. Cervical radiculopathy  M54.12       3. Acute pain of both shoulders  M25.511     M25.512           Start Time: 1130  Stop Time: 1215  Total time in clinic (min): 45 minutes  Assessment  Assessment details: Ludwin Simpson is a 34 y.o. female attending physical therapy for acute pain of R knee. Pt was also referred to physical therapy for cervical radiculopathy and pain of BL shoulders which was assessed this visit as her knee pain has been improving. Primary impairments include increased pain with functional activities, decreased BL UE strength, BL shoulder motor control dysfunction, and decreased postural strength/endurance which is limiting her ability to perform ADLs and recreational activities without pain or functional restrictions. Pt presented with positive thompson and painful arc at the R shoulder but was not positive for any other special tests. She displayed hypermobility during ROM assessment and decreased GHJ stability which may be contributing to ongoing symptoms. Pt was provided with a basic HEP which will be reviewed in the upcoming session. Pt was educated on anatomy and physiology of diagnosis and demonstrated verbal understanding. Pt would benefit from skilled PT interventions to increase functional upper extremity strength, increase pain free ROM, improve dynamic shoulder stability, and facilitate return to recreational activities and ADL management/independence with less limitations and pain. 1:1 with Papito Jones DPT entirety of tx.   Impairments: abnormal gait, abnormal or restricted ROM, activity intolerance, impaired physical strength, lacks appropriate home exercise program, pain with function, poor posture  and poor body mechanics    Symptom irritability: moderateBarriers to therapy: None  Understanding of Dx/Px/POC: excellent   Prognosis: good    Goals  Short term goals:   STG's to be met in 3 weeks:   1. Decrease pain by 50% or more during functional movements to increase QoL. 2.  Knee AROM to 0-130 degrees to improve standing and transfers abilities. 3.  Improve R knee and hip strength by half grade or more to improve functional LE strength. 4.  Basic HEP, independent. Shoulder Goals: 12/8/2023  Decreased pain by 25% or more during AROM to improve QoL. Improve UE/postural strength by half grade or more to improve functional UE strength. Independent with basic shoulder HEP. Long term goals:  LTG's to be met by DC:  1. Ambulate community distances with little to no difficulty. 2.  All transfers with little to no difficulty. 3.  Knee ROM to [facility] to improve functional ROM and QoL. 4.  Increase strength to 5/5 in RLE to decrease risk for re-injury. 5.  Independent with advanced HEP. 6.  Increase FOTO to that of predicted value or better. Plan  Patient would benefit from: skilled physical therapy  Referral necessary: No  Planned therapy interventions: abdominal trunk stabilization, balance/weight bearing training, body mechanics training, functional ROM exercises, home exercise program, gait training, joint mobilization, manual therapy, massage, neuromuscular re-education, patient education, postural training, strengthening, therapeutic activities, therapeutic exercise, graded activity, graded exercise and stretching  Frequency: 2x week  Duration in weeks: 8  Plan of Care beginning date: 11/3/2023  Plan of Care expiration date: 1/12/2024  Treatment plan discussed with: patient      Subjective  HPI: Pt noted that her shoulders have been bothering her for a few weeks with insidious onset but her pain has been on and off.  She has most difficulty with lifting motions overhead and the pain is located at BL shoulders but is bothering her more at the R anterior shoulder. She notes current pain is a 1/10 and worst it has been would be a 3/10. Standing         Head Position X Protracted  Neutral  Retracted   Scapular Position X Protracted  Neutral  Retracted   Thoracic Spine  Inc Kyphosis X Neutral     Lumbar Spine  Inc Lordosis X Neutral  Dec Lordosis   Pelvis  Anterior Tilt X Neutral  Posterior Tilt   Iliac Crest  L elevated X Neutral  R elevated   Scoliotic Curvature  "C" Curve  "S" Curve     Lateral Shift  Right  Left X None     Strength and ROM evaluated B from a regional biomechanical perspective and values relevant to this episode recorded in table below    ROM: Goniometric measurement revealed the following findings. Shoulder ROM Right: 12/8/2023 Left: 12/8/2023   Flexion 180 180   Abduction 180 180   ER in Supine @90     IR T6 T6          Strength: MMT revealed the following findings. Joint Motion Right: 12/8/2023 Left: 12/8/2023   Sh. Flexion 4-/5 4-/5   Sh. Abduction 4-/5 4-/5   Sh. ER 4-/5 4-/5   Sh. IR 4-/5 4-/5   Shoulder extension 3+/5 3+/5   Middle Trapezius 3+/5 3+/5   Lower Trapezius 3+/5 3+/5     Additional Assessments:  Palpation: Pain with palpation at R anterior shoulder  Joint mobility: Hypermobile with PROM at BL shoudlers  Cervical Spine Functional ROM: WFL  ULTT: N BL shoulders                                                                                                                                                Special Test / Measure  Right: 12/8/2023 Left: 12/8/2023   [place] Pos N   Painful Arc Pos N   Infraspinatus Strength Test N N   Drop Arm N N   Supraspinatus (empty can) N N   Neers N N   ER Lag N N     Access Code: [email or web address]  URL: [email or web address]  Date: 12/08/2023  Prepared by: Agustina Stein    Exercises  - Prone Scapular Slide with Shoulder Extension  - 1 x daily - 5 x weekly - 2 sets - 10 reps - 5 seconds hold  - Prone Scapular Retraction Arms at Side - 1 x daily - 5 x weekly - 2 sets - 10 reps - 5 seconds hold  - Standing Shoulder Horizontal Abduction with Resistance  - 1 x daily - 5 x weekly - 2 sets - 10 reps - 3 seconds hold  - Shoulder Flexion Serratus Activation with Resistance  - 1 x daily - 5 x weekly - 2 sets - 10 reps     Precautions: Hx of shoulder pain    POC expires Unit limit Auth Expiration date PT/OT + Visit Limit?    N/a BOMN 12/31/23 25 PCY         Visit/Unit Tracking  AUTH Status:  Date 11/3 11/8 11/15 11/17 11/21 11/24 11/28 12/6 12/8   Approved Used 1 2 3 4 5 6 7 8 9    Remaining  24 23 22 21 20 19 18 17 16      Pertinent Findings:      POC End Date: Pending                                                                                          Test / Measure  11/3/2023   FOTO (Predicted 70) 42   R Knee ROM 0-100 AROM   R Knee Strength 3+ to 4/5     Appointment Number:  1 2 3 4 5 6 7 8 9    Manuals 11/3 11/8 11/15 11/17 11/21 11/24 11/28 12/6 12/8    R Knee PROM  MC           Sitting R Mobilization                                                    Neuro Re-Ed             TA Recruitment + Bridge  20x 10x, 2x8 Single 10x, 2x8 Single 10x, 2x8 Single 10x, 2x8 Single 10x, 2x8 Single 10x, 2x8 Single     SLR  10x 2x10, 2.5# 2x10, 3# 2x10, 3# 2x10, 5# 2x10, 5# 2x10, 5#      SL Abduction 10x 2x10, 2.5# 3x10, 3# 3x10, 3# 3x10, 5# 3x10, 5# 3x10, 5#      Supine Heel Slides 10x    Tball hip flex, 10x Tball hip flex, 10x Tball hip flex, 10x Tball hip flex, 10x     Tball Hip Ext Alternating     2x5 2x5 2x5 2x5     Sitting Heel Flex Stretch 5x            SLS  On ariex, 3x10s hold           Prone I/T         10x ea                 Ther Ex             HEP Review + Pt Edu 10 min 5 min       Re-Eval 15 min    Recumbent Bike  5 min, lvl 2 5 min, lvl 2 5 min, lvl 2 5 min, lvl 2 5 min, lvl 2 5 min, lvl 2 5 min, lvl 2 5 min, lvl 2                 STS 10x 2x10, @ bar 3x7, 20# 3x7, 20# 3x7, 25# 3x8, 25# 3x8, 25# 3x8, 25#     Modified DL    3x8, 35# 3x8, 45# 3x8, 45# 3x8, 45# 3x8, 45#     Leg Press  Staggered, 2x8 55# Staggered, 2x8 55# Staggered, 2x8 55# Staggered, 2x8 55# Staggered, 3x8, 85# Staggered, 3x8, 85# Staggered, 3x8, 85#     Lunges  Rle Front, 2x8 Rle Front, 2x8 RLE Front, 2x8 2x8 ea to 4" step + airex 2x8 ea to 4" step + airex NV 2x8 to foam, 15# with RLE in front     Standing Hip Abd/ext             Side Steps w/ TB  BTB at midfoot, 3 laps BTB at midfoot, 3 laps BTB at midfoot, 3 laps Standing Fire Hydrant 2x8 Standing Fire Hydrant 2x8 Standing Fire Hydrant 2x8 Standing Fire Hydrant 2x8 BTB     Nicolle Fwd/Bwd Ambulation             Suitcase Carry                          Shoulder Flex         10x    Shoulder Horizontal Abd         10x    Ther Activity             Fwd Step Ups  2x10 3x6, Unilateral 3x6, Unilateral, 6"" step 3x6, Unilateral, 6"" step 2x8, Unilateral, 6"" step 2x8, Unilateral, 6"" step 2x8, Unilateral, 6" step w/ 15#     Lateral Step Up  2x5 RLE 2x5 RLE 2x5 RLE 6" step 2x5 RLE 6" step 2x5 RLE 6" step 2x8 RLE 6" step 2x8 RLE 6" step     Gait Training                                       Modalities

## 2023-12-12 ENCOUNTER — OFFICE VISIT (OUTPATIENT)
Dept: PHYSICAL THERAPY | Facility: REHABILITATION | Age: 28
End: 2023-12-12
Payer: COMMERCIAL

## 2023-12-12 DIAGNOSIS — M54.12 CERVICAL RADICULOPATHY: ICD-10-CM

## 2023-12-12 DIAGNOSIS — M25.512 ACUTE PAIN OF BOTH SHOULDERS: ICD-10-CM

## 2023-12-12 DIAGNOSIS — M25.561 ACUTE PAIN OF RIGHT KNEE: Primary | ICD-10-CM

## 2023-12-12 DIAGNOSIS — M25.511 ACUTE PAIN OF BOTH SHOULDERS: ICD-10-CM

## 2023-12-12 PROCEDURE — 97110 THERAPEUTIC EXERCISES: CPT

## 2023-12-12 PROCEDURE — 97140 MANUAL THERAPY 1/> REGIONS: CPT

## 2023-12-12 PROCEDURE — 97112 NEUROMUSCULAR REEDUCATION: CPT

## 2023-12-12 NOTE — PROGRESS NOTES
Daily Note     Today's date: 2023  Patient name: Cristy Teague  : 1995  MRN: [id number]  Referring provider: Concetta Arriola MD  Dx:   Encounter Diagnosis     ICD-10-CM    1. Acute pain of right knee  M25.561       2. Cervical radiculopathy  M54.12       3. Acute pain of both shoulders  M25.511     M25.512           Start Time: 1405  Stop Time: 1445  Total time in clinic (min): 40 minutes    Subjective: Pt notes that her neck and shoulder are bothering her more today but her knees have been feeling okay. She would like to focus on her neck and shoulders today. Objective: See treatment diary below      Assessment: Tolerated treatment well. Patient would benefit from continued PT. Pt was introduced to further functional UE strength and postural endurance exercises and responded well without excessive increase in pain or soreness. She responded well to MT focused on GHJ inferior and posterior mobilization as she noted decreased pain at the L shoulder following MT. Continue to progress as tolerated, 1:1 with Yudy Shipman DPT entirety of tx. Plan: Continue per plan of care. Precautions: Hx of shoulder pain    POC expires Unit limit Auth Expiration date PT/OT + Visit Limit?    N/a BOMN 23 25 PCY         Visit/Unit Tracking  AUTH Status:  Date 11/3 11/8 11/15 11/17 11/21 11/24 11/28 12/6 12/8 12/12   Approved Used 1 2 3 4 5 6 7 8 9 10    Remaining  24 23 22 21 20 19 18 17 16 15      Pertinent Findings:      POC End Date: Pending                                                                                          Test / Measure  11/3/2023   FOTO (Predicted 70) 42   R Knee ROM 0-100 AROM   R Knee Strength 3+ to 4/5     Appointment Number:  1 2 3 4 5 6 7 8 9 10   Manuals 11/3 11/8 11/15 11/17 11/21 11/24 11/28 12/6 12/8 12/12   R Knee PROM  MC           Sitting R Mobilization             Shoulder GHJ Mob          Inf/post, MC grade 3-4 L side   C-Spine Distraction Neuro Re-Ed             TA Recruitment + Bridge  20x 10x, 2x8 Single 10x, 2x8 Single 10x, 2x8 Single 10x, 2x8 Single 10x, 2x8 Single 10x, 2x8 Single     SLR  10x 2x10, 2.5# 2x10, 3# 2x10, 3# 2x10, 5# 2x10, 5# 2x10, 5#      SL Abduction 10x 2x10, 2.5# 3x10, 3# 3x10, 3# 3x10, 5# 3x10, 5# 3x10, 5#      Supine Heel Slides 10x    Tball hip flex, 10x Tball hip flex, 10x Tball hip flex, 10x Tball hip flex, 10x     Tball Hip Ext Alternating     2x5 2x5 2x5 2x5     Sitting Heel Flex Stretch 5x            SLS  On ariex, 3x10s hold           Prone I/T         10x ea 10x ea   Wall Slides          10x   PNF D1/2 Flex          NV                             Ther Ex             HEP Review + Pt Edu 10 min 5 min       Re-Eval 15 min    Recumbent Bike  5 min, lvl 2 5 min, lvl 2 5 min, lvl 2 5 min, lvl 2 5 min, lvl 2 5 min, lvl 2 5 min, lvl 2 5 min, lvl 2 UBE, 3'/3'                STS 10x 2x10, @ bar 3x7, 20# 3x7, 20# 3x7, 25# 3x8, 25# 3x8, 25# 3x8, 25#     Modified DL    3x8, 35# 3x8, 45# 3x8, 45# 3x8, 45# 3x8, 45#     Leg Press  Staggered, 2x8 55# Staggered, 2x8 55# Staggered, 2x8 55# Staggered, 2x8 55# Staggered, 3x8, 85# Staggered, 3x8, 85# Staggered, 3x8, 85#     Lunges  Rle Front, 2x8 Rle Front, 2x8 RLE Front, 2x8 2x8 ea to 4" step + airex 2x8 ea to 4" step + airex NV 2x8 to foam, 15# with RLE in front     Standing Hip Abd/ext             Side Steps w/ TB  BTB at midfoot, 3 laps BTB at midfoot, 3 laps BTB at midfoot, 3 laps Standing Fire Hydrant 2x8 Standing Fire Hydrant 2x8 Standing Fire Hydrant 2x8 Standing Fire Hydrant 2x8 BTB     Nicolle Fwd/Bwd Ambulation             Suitcase Carry                          Shoulder Flex         10x 2x8, GTB   Shoulder Horizontal Abd         10x 2x8, BTB   BL Rows          3x8, 35#   Shoulder Ext BL          2x8, 12#                Ther Activity             Fwd Step Ups  2x10 3x6, Unilateral 3x6, Unilateral, 6"" step 3x6, Unilateral, 6"" step 2x8, Unilateral, 6"" step 2x8, Unilateral, 6"" step 2x8, Unilateral, 6" step w/ 15#     Lateral Step Up  2x5 RLE 2x5 RLE 2x5 RLE 6" step 2x5 RLE 6" step 2x5 RLE 6" step 2x8 RLE 6" step 2x8 RLE 6" step     Gait Training                                       Modalities

## 2023-12-14 ENCOUNTER — OFFICE VISIT (OUTPATIENT)
Dept: PHYSICAL THERAPY | Facility: REHABILITATION | Age: 28
End: 2023-12-14
Payer: COMMERCIAL

## 2023-12-14 DIAGNOSIS — M25.561 ACUTE PAIN OF RIGHT KNEE: Primary | ICD-10-CM

## 2023-12-14 DIAGNOSIS — M25.511 ACUTE PAIN OF BOTH SHOULDERS: ICD-10-CM

## 2023-12-14 DIAGNOSIS — M54.12 CERVICAL RADICULOPATHY: ICD-10-CM

## 2023-12-14 DIAGNOSIS — M25.512 ACUTE PAIN OF BOTH SHOULDERS: ICD-10-CM

## 2023-12-14 PROCEDURE — 97110 THERAPEUTIC EXERCISES: CPT

## 2023-12-14 PROCEDURE — 97112 NEUROMUSCULAR REEDUCATION: CPT

## 2023-12-14 PROCEDURE — 97140 MANUAL THERAPY 1/> REGIONS: CPT

## 2023-12-14 NOTE — PROGRESS NOTES
Daily Note     Today's date: 2023  Patient name: Carlie Moss  : 1995  MRN: [id number]  Referring provider: Radha Greer MD  Dx:   Encounter Diagnosis     ICD-10-CM    1. Acute pain of right knee  M25.561       2. Cervical radiculopathy  M54.12       3. Acute pain of both shoulders  M25.511     M25.512           Start Time: 1407  Stop Time: 1450  Total time in clinic (min): 43 minutes    Subjective: Pt mentioned that her knees have been feeling good but her L shoulder is sore today. Objective: See treatment diary below      Assessment: Tolerated treatment well. Patient would benefit from continued PT. Pt continues to respond well to inferior/posterior glides at the L shoulder as she notes slight decreased pain following. Pt was introduced to further GHJ stability exercises this visit and responded well without excessive increase in pain or soreness. She responds well to Vcs for GHJ stability during overhead movements and shows improved movement quality with cueing. Continue to progress as tolerated, 1:1 with Tereza Anders DPT entirety of tx. Plan: Continue per plan of care.       Precautions: Hx of shoulder pain    POC expires Unit limit Auth Expiration date PT/OT + Visit Limit?   24 BOMN 23 25 PCY         Visit/Unit Tracking  AUTH Status:  Date    Approved Used 8 9 10 11    Remaining  17 16 15 14      Pertinent Findings:      POC End Date: Pending                                                                                          Test / Measure  11/3/2023   FOTO (Predicted 70) 42   R Knee ROM 0-100 AROM   R Knee Strength 3+ to 4/5     Appointment Number:  8 9 10 11   Manuals    R Knee PROM       Sitting R Mobilization       Shoulder GHJ Mob   Inf/post, MC grade 3-4 L side Inf/post, MC grade 3-4 L side   C-Spine Distraction              Neuro Re-Ed       TA Recruitment + Bridge 10x, 2x8 Single      SLR        SL Abduction Supine Heel Slides Tball hip flex, 10x      Tball Hip Ext Alternating 2x5      Sitting Heel Flex Stretch       SLS       Prone I/T  10x ea 10x ea 10x ea   Wall Slides   10x 10x   PNF D1/2 Flex   NV 2x8 ea                 Ther Ex       HEP Review + Pt Edu  Re-Eval 15 min     Recumbent Bike 5 min, lvl 2 5 min, lvl 2 UBE, 3'/3' UBE, 3'/3'          STS 3x8, 25#      Modified DL 3x8, 45#      Leg Press Staggered, 3x8, 85#      Lunges 2x8 to foam, 15# with RLE in front      Standing Hip Abd/ext       Side Steps w/ TB Standing Fire Hydrant 2x8 BTB      Nicolle Fwd/Bwd Ambulation       Suitcase Carry              Shoulder Flex  10x 2x8, GTB 2x8, GTB   Shoulder Horizontal Abd  10x 2x8, BTB    BL Rows   3x8, 35# 3x8, 35#   Shoulder Ext BL   2x8, 12# 2x8, 12#   90/90 Press    2x8 RTB   DB Shoulder Press       [place] Press              Ther Activity       Fwd Step Ups 2x8, Unilateral, 6" step w/ 15#      Lateral Step Up 2x8 RLE 6" step      Gait Training                     Modalities

## 2023-12-19 ENCOUNTER — OFFICE VISIT (OUTPATIENT)
Dept: PHYSICAL THERAPY | Facility: REHABILITATION | Age: 28
End: 2023-12-19
Payer: COMMERCIAL

## 2023-12-19 DIAGNOSIS — M25.561 ACUTE PAIN OF RIGHT KNEE: Primary | ICD-10-CM

## 2023-12-19 DIAGNOSIS — M25.511 ACUTE PAIN OF BOTH SHOULDERS: ICD-10-CM

## 2023-12-19 DIAGNOSIS — M54.12 CERVICAL RADICULOPATHY: ICD-10-CM

## 2023-12-19 DIAGNOSIS — M25.512 ACUTE PAIN OF BOTH SHOULDERS: ICD-10-CM

## 2023-12-19 PROCEDURE — 97110 THERAPEUTIC EXERCISES: CPT

## 2023-12-19 PROCEDURE — 97140 MANUAL THERAPY 1/> REGIONS: CPT

## 2023-12-19 PROCEDURE — 97112 NEUROMUSCULAR REEDUCATION: CPT

## 2023-12-19 NOTE — PROGRESS NOTES
"Daily Note     Today's date: 2023  Patient name: Uzair Jean  : 1995  MRN: [id number]  Referring provider: Hai Moeller MD  Dx:   Encounter Diagnosis     ICD-10-CM    1. Acute pain of right knee  M25.561       2. Cervical radiculopathy  M54.12       3. Acute pain of both shoulders  M25.511     M25.512           Start Time: 1415  Stop Time: 1510  Total time in clinic (min): 55 minutes    Subjective: Pt notes that her knee and shoulder has been feeling \"okay\" this weekend and that she has been having less soreness and pain.       Objective: See treatment diary below      Assessment: Tolerated treatment well. Patient would benefit from continued PT. Pt was introduced to STS with shoulder press to improve LE/UE functional strength and responded well without excessive increase in pain or soreness. She was most challenged by lunge position shoulder press as LE endurance and shoulder stability continues to be decreased. She continues to respond well to Vcs for GHJ stability and knee positioning during dynamic movements and shows improved technique with cueing. 1:1 with Nabil Acuña DPT entirety of tx.      Plan: Continue per plan of care.      Precautions: Hx of shoulder pain    POC expires Unit limit Auth Expiration date PT/OT + Visit Limit?   24 BOMN 23 25 PCY         Visit/Unit Tracking  AUTH Status:  Date    Approved Used 8 9 10 11 12    Remaining  17 16 15 14 13      Pertinent Findings:      POC End Date: Pending                                                                                          Test / Measure  11/3/2023   FOTO (Predicted 70) 42   R Knee ROM 0-100 AROM   R Knee Strength 3+ to 4/5     Appointment Number:  8 9 10 11 12   Manuals    R Knee PROM        Sitting R Mobilization        Shoulder GHJ Mob   Inf/post, MC grade 3-4 L side Inf/post, MC grade 3-4 L side Inf/post, MC grade 3-4 L side   C-Spine Distraction " "               Neuro Re-Ed        TA Recruitment + Bridge 10x, 2x8 Single       SLR         SL Abduction        Supine Heel Slides Tball hip flex, 10x       Tball Hip Ext Alternating 2x5       Sitting Heel Flex Stretch        SLS        Prone I/T  10x ea 10x ea 10x ea 10x ea   Wall Slides   10x 10x    PNF D1/2 Flex   NV 2x8 ea 2x8 ea   ER+Press     2x5, RTB           Ther Ex        HEP Review + Pt Edu  Re-Eval 15 min      Recumbent Bike 5 min, lvl 2 5 min, lvl 2 UBE, 3'/3' UBE, 3'/3' UBE, 3'/3'           STS 3x8, 25#    +Press, 3x7 8# ea   Modified DL 3x8, 45#       Leg Press Staggered, 3x8, 85#       Lunges 2x8 to foam, 15# with RLE in front    :Lunge Hold 2x8 Overhead press RTB   Standing Hip Abd/ext        Side Steps w/ TB Standing Fire Hydrant 2x8 BTB       Nicolle Fwd/Bwd Ambulation        Suitcase Carry                Shoulder Flex  10x 2x8, GTB 2x8, GTB 2x8, GTB   Shoulder Horizontal Abd  10x 2x8, BTB     BL Rows   3x8, 35# 3x8, 35# 3x8, 35#   Shoulder Ext BL   2x8, 12# 2x8, 12# 2x8, 15#   90/90 Press    2x8 RTB 2x8 RTB   DB Shoulder Press     Arnold 3x6, 8# ea   [place] Press                Ther Activity        Fwd Step Ups 2x8, Unilateral, 6\" step w/ 15#       Lateral Step Up 2x8 RLE 6\" step       Gait Training                        Modalities                                                    "

## 2023-12-21 ENCOUNTER — OFFICE VISIT (OUTPATIENT)
Dept: PHYSICAL THERAPY | Facility: REHABILITATION | Age: 28
End: 2023-12-21
Payer: COMMERCIAL

## 2023-12-21 DIAGNOSIS — M25.511 ACUTE PAIN OF BOTH SHOULDERS: ICD-10-CM

## 2023-12-21 DIAGNOSIS — M54.12 CERVICAL RADICULOPATHY: ICD-10-CM

## 2023-12-21 DIAGNOSIS — M25.561 ACUTE PAIN OF RIGHT KNEE: Primary | ICD-10-CM

## 2023-12-21 DIAGNOSIS — M25.512 ACUTE PAIN OF BOTH SHOULDERS: ICD-10-CM

## 2023-12-21 PROCEDURE — 97112 NEUROMUSCULAR REEDUCATION: CPT

## 2023-12-21 PROCEDURE — 97110 THERAPEUTIC EXERCISES: CPT

## 2023-12-21 NOTE — PROGRESS NOTES
"Daily Note     Today's date: 2023  Patient name: Uzair Jean  : 1995  MRN: [id number]  Referring provider: Hai Moeller MD  Dx:   Encounter Diagnosis     ICD-10-CM    1. Acute pain of right knee  M25.561       2. Cervical radiculopathy  M54.12       3. Acute pain of both shoulders  M25.511     M25.512           Start Time: 1410  Stop Time: 1448  Total time in clinic (min): 38 minutes    Subjective: Pt notes that her L shoulder is bothering her more today as she feels that \"something is not right\". Pt mentioned that she continues to feel better leaving PT and has been doing her exercises at home.       Objective: See treatment diary below      Assessment: Tolerated treatment well. Patient would benefit from continued PT. Pt continues to respond well to MT focused on GHJ mobilization as she notes decreased pain following. She was introduced to wall clocks to improve GHJ stability and shoulder strength and responded well without excessive increase in pain or soreness. She continues to be challenged with lunges with shoulder press as she notes decreased UE and LE strength. She continues to respond well to Vcs for technique during overhead and dynamic LE movements. 1:1 with Nabil Acuña DPT entirety of tx.      Plan: Continue per plan of care.      Precautions: Hx of shoulder pain    POC expires Unit limit Auth Expiration date PT/OT + Visit Limit?   24 BOMN 23 25 PCY         Visit/Unit Tracking  AUTH Status:  Date    Approved Used 8 9 10 11 12 13    Remaining  17 16 15 14 13 12      Pertinent Findings:      POC End Date: Pending                                                                                          Test / Measure  11/3/2023   FOTO (Predicted 70) 42   R Knee ROM 0-100 AROM   R Knee Strength 3+ to 4/5     Appointment Number:  8 9 10 11 12 13   Manuals    R Knee PROM         Sitting R Mobilization       " "  Shoulder GHJ Mob   Inf/post, MC grade 3-4 L side Inf/post, MC grade 3-4 L side Inf/post, MC grade 3-4 L side Inf/post, MC grade 3-4 L side   C-Spine Distraction                  Neuro Re-Ed         TA Recruitment + Bridge 10x, 2x8 Single        SLR          SL Abduction         Supine Heel Slides Tball hip flex, 10x        Tball Hip Ext Alternating 2x5        Sitting Heel Flex Stretch         SLS         Prone I/T  10x ea 10x ea 10x ea 10x ea    Wall Slides   10x 10x     PNF D1/2 Flex   NV 2x8 ea 2x8 ea 2x8 ea   ER+Press     2x5, RTB 2x5, RTB   Wall Clocks      2x5, GTB   Ther Ex         HEP Review + Pt Edu  Re-Eval 15 min       Recumbent Bike 5 min, lvl 2 5 min, lvl 2 UBE, 3'/3' UBE, 3'/3' UBE, 3'/3' UBE, 3'/3'            STS 3x8, 25#    +Press, 3x7 8# ea +Press, 3x7 8# ea   Modified DL 3x8, 45#        Leg Press Staggered, 3x8, 85#        Lunges 2x8 to foam, 15# with RLE in front    :Lunge Hold 2x8 Overhead press RTB Lunge Hold 2x8 Overhead press RTB   Standing Hip Abd/ext         Side Steps w/ TB Standing Fire Hydrant 2x8 BTB        [place] Fwd/Bwd Ambulation         Suitcase Carry                  Shoulder Flex  10x 2x8, GTB 2x8, GTB 2x8, GTB 2x8, GTB   Shoulder Horizontal Abd  10x 2x8, BTB      BL Rows   3x8, 35# 3x8, 35# 3x8, 35# 3x8, 35#   Shoulder Ext BL   2x8, 12# 2x8, 12# 2x8, 15# 2x8, 15#   90/90 Press    2x8 RTB 2x8 RTB 2x8 RTB   DB Shoulder Press     Arnold 3x6, 8# ea Arnold 3x8, 8# ea   [place] Press                  Ther Activity         Fwd Step Ups 2x8, Unilateral, 6\" step w/ 15#        Lateral Step Up 2x8 RLE 6\" step        Gait Training                           Modalities                                                         "

## 2024-01-03 ENCOUNTER — OFFICE VISIT (OUTPATIENT)
Dept: PHYSICAL THERAPY | Facility: REHABILITATION | Age: 29
End: 2024-01-03
Payer: COMMERCIAL

## 2024-01-03 DIAGNOSIS — M25.511 ACUTE PAIN OF BOTH SHOULDERS: ICD-10-CM

## 2024-01-03 DIAGNOSIS — M54.12 CERVICAL RADICULOPATHY: ICD-10-CM

## 2024-01-03 DIAGNOSIS — M25.561 ACUTE PAIN OF RIGHT KNEE: Primary | ICD-10-CM

## 2024-01-03 DIAGNOSIS — M25.512 ACUTE PAIN OF BOTH SHOULDERS: ICD-10-CM

## 2024-01-03 PROCEDURE — 97110 THERAPEUTIC EXERCISES: CPT | Performed by: PHYSICAL THERAPIST

## 2024-01-03 PROCEDURE — 97112 NEUROMUSCULAR REEDUCATION: CPT | Performed by: PHYSICAL THERAPIST

## 2024-01-03 PROCEDURE — 97140 MANUAL THERAPY 1/> REGIONS: CPT | Performed by: PHYSICAL THERAPIST

## 2024-01-03 NOTE — PROGRESS NOTES
Daily Note     Today's date: 1/3/2024  Patient name: Uzair Jean  : 1995  MRN: [id number]  Referring provider: Hai Moeller MD  Dx:   Encounter Diagnosis     ICD-10-CM    1. Acute pain of right knee  M25.561       2. Cervical radiculopathy  M54.12       3. Acute pain of both shoulders  M25.511     M25.512                      Subjective: Patient stated minimal soreness in her right shoulder prior to treatment session.       Objective: See treatment diary below      Assessment: Patient requested to use 5# weights instead of 8# weights with Arnold presses this visit; positive response to shoulder mobilization; no c/o at completion of treatment session.       Plan: Continue per plan of care.      Precautions: Hx of shoulder pain    POC expires Unit limit Auth Expiration date PT/OT + Visit Limit?   24 BOMN 23 25 PCY         Visit/Unit Tracking  AUTH Status:  Date 1/3 12/8 12/12 12/14 12/19 12/21   Approved Used 1 9 10 11 12 13    Remaining  24 16 15 14 13 12      Pertinent Findings:      POC End Date: Pending                                                                                          Test / Measure  11/3/2023   FOTO (Predicted 70) 42   R Knee ROM 0-100 AROM   R Knee Strength 3+ to 4/5     Appointment Number:  14 9 10 11 12 13   Manuals 1/3 12/8 12/12 12/14 12/19 12/21   R Knee PROM         Sitting R Mobilization         Shoulder GHJ Mob Inf/post, MC grade 3-4 R side  Inf/post, MC grade 3-4 L side Inf/post, MC grade 3-4 L side Inf/post, MC grade 3-4 L side Inf/post, MC grade 3-4 L side   C-Spine Distraction                  Neuro Re-Ed         TA Recruitment + Bridge         SLR          SL Abduction         Supine Heel Slides         Tball Hip Ext Alternating         Sitting Heel Flex Stretch         SLS         Prone I/T 10x ea 10x ea 10x ea 10x ea 10x ea    Wall Slides   10x 10x     PNF D1/2 Flex OTB 2x10 ea.  NV 2x8 ea 2x8 ea 2x8 ea   ER+Press 2x5, RTB    2x5, RTB 2x5, RTB    Wall Clocks 2x5, GTB     2x5, GTB   Ther Ex         HEP Review + Pt Edu  Re-Eval 15 min       Recumbent Bike UBE, 3'/3' 5 min, lvl 2 UBE, 3'/3' UBE, 3'/3' UBE, 3'/3' UBE, 3'/3'            STS     +Press, 3x7 8# ea +Press, 3x7 8# ea   Modified DL         Leg Press         Lunges     :Lunge Hold 2x8 Overhead press RTB Lunge Hold 2x8 Overhead press RTB   Standing Hip Abd/ext         Side Steps w/ TB         Nicolle Fwd/Bwd Ambulation         Suitcase Carry                  Shoulder Flex  10x 2x8, GTB 2x8, GTB 2x8, GTB 2x8, GTB   Shoulder Horizontal Abd  10x 2x8, BTB      BL Rows 3x8, 35#  3x8, 35# 3x8, 35# 3x8, 35# 3x8, 35#   Shoulder Ext BL 2x8, 15#  2x8, 12# 2x8, 12# 2x8, 15# 2x8, 15#   90/90 Press 2x8 RTB   2x8 RTB 2x8 RTB 2x8 RTB   DB Shoulder Press Arnold 3x8, 5# ea    Arnold 3x6, 8# ea Arnold 3x8, 8# ea   [place] Press                  Ther Activity         Fwd Step Ups         Lateral Step Up         Gait Training                           Modalities

## 2024-01-05 ENCOUNTER — APPOINTMENT (OUTPATIENT)
Dept: PHYSICAL THERAPY | Facility: REHABILITATION | Age: 29
End: 2024-01-05
Payer: COMMERCIAL

## 2024-01-10 ENCOUNTER — APPOINTMENT (OUTPATIENT)
Dept: PHYSICAL THERAPY | Facility: REHABILITATION | Age: 29
End: 2024-01-10
Payer: COMMERCIAL

## 2024-01-11 ENCOUNTER — APPOINTMENT (OUTPATIENT)
Dept: PHYSICAL THERAPY | Facility: REHABILITATION | Age: 29
End: 2024-01-11
Payer: COMMERCIAL

## 2024-01-12 ENCOUNTER — OFFICE VISIT (OUTPATIENT)
Dept: PHYSICAL THERAPY | Facility: REHABILITATION | Age: 29
End: 2024-01-12
Payer: COMMERCIAL

## 2024-01-12 DIAGNOSIS — M25.512 ACUTE PAIN OF BOTH SHOULDERS: ICD-10-CM

## 2024-01-12 DIAGNOSIS — M25.511 ACUTE PAIN OF BOTH SHOULDERS: ICD-10-CM

## 2024-01-12 DIAGNOSIS — M54.12 CERVICAL RADICULOPATHY: ICD-10-CM

## 2024-01-12 DIAGNOSIS — M25.561 ACUTE PAIN OF RIGHT KNEE: Primary | ICD-10-CM

## 2024-01-12 PROCEDURE — 97110 THERAPEUTIC EXERCISES: CPT

## 2024-01-12 PROCEDURE — 97112 NEUROMUSCULAR REEDUCATION: CPT

## 2024-01-12 NOTE — PROGRESS NOTES
Daily Note     Today's date: 2024  Patient name: Uzair Jean  : 1995  MRN: [id number]  Referring provider: Hai Moeller MD  Dx:   Encounter Diagnosis     ICD-10-CM    1. Acute pain of right knee  M25.561       2. Cervical radiculopathy  M54.12       3. Acute pain of both shoulders  M25.511     M25.512           Start Time: 1348  Stop Time: 1426  Total time in clinic (min): 38 minutes    Subjective: Pt mentioned that she was unable to attend PT last week due to being busy but feels her shoulders and knees have been feeling better. She will be signing up for a gym next this weekend as she wants to be discharged next visit.       Objective: See treatment diary below      Assessment: Tolerated treatment well. Patient would benefit from continued PT. Pt was able to progress with increased resistance with PNF exercises which demonstrates improving functional UE strength. She was also able to tolerate increased resistance with leg press and lunges as LE strength has improved. Pt continues to be challenged with endurance with UE/LE exercises as demonstrated by fatigue with exercises. Pt will be given an updated HEP next visit with potential discharge. 1:1 with Nabil Acuña DPT entirety of tx.      Plan: Potential discharge next visit.     Precautions: Hx of shoulder pain    POC expires Unit limit Auth Expiration date PT/OT + Visit Limit?   24 BOMN 23 25 PCY         Visit/Unit Tracking  AUTH Status:  Date 1/3 12/8 12/12 12/14 12/19 12/21 1/12   Approved Used 1 9 10 11 12 13 14    Remaining  24 16 15 14 13 12 23      Pertinent Findings:      POC End Date: Pending                                                                                          Test / Measure  11/3/2023   FOTO (Predicted 70) 42   R Knee ROM 0-100 AROM   R Knee Strength 3+ to 4/5     Appointment Number:  14 9 10 11 12 13 14   Manuals 1/3 12/8 12/12 12/14 12/19 12/21 1/12   R Knee PROM          Sitting R Mobilization           Shoulder GHJ Mob Inf/post, MC grade 3-4 R side  Inf/post, MC grade 3-4 L side Inf/post, MC grade 3-4 L side Inf/post, MC grade 3-4 L side Inf/post, MC grade 3-4 L side    C-Spine Distraction                    Neuro Re-Ed          TA Recruitment + Bridge          SLR           SL Abduction          Supine Heel Slides          Tball Hip Ext Alternating          Sitting Heel Flex Stretch          SLS          Prone I/T 10x ea 10x ea 10x ea 10x ea 10x ea     Wall Slides   10x 10x      PNF D1/2 Flex OTB 2x10 ea.  NV 2x8 ea 2x8 ea 2x8 ea 2x10, 5#   ER+Press 2x5, RTB    2x5, RTB 2x5, RTB 2x5, RTB   Wall Clocks 2x5, GTB     2x5, GTB 2x5, GTB   Ther Ex          HEP Review + Pt Edu  Re-Eval 15 min        Recumbent Bike UBE, 3'/3' 5 min, lvl 2 UBE, 3'/3' UBE, 3'/3' UBE, 3'/3' UBE, 3'/3' UBE, 3'/3'             STS     +Press, 3x7 8# ea +Press, 3x7 8# ea +Press, 3x7 8# ea   Modified DL          Leg Press       3x8 100#   Lunges     :Lunge Hold 2x8 Overhead press RTB Lunge Hold 2x8 Overhead press RTB Lunge Hold 2x8, 5# ea hand   Standing Hip Abd/ext          Side Steps w/ TB          Nicolle Fwd/Bwd Ambulation          Suitcase Carry                    Shoulder Flex  10x 2x8, GTB 2x8, GTB 2x8, GTB 2x8, GTB    Shoulder Horizontal Abd  10x 2x8, BTB       BL Rows 3x8, 35#  3x8, 35# 3x8, 35# 3x8, 35# 3x8, 35# 3x8, 35#   Shoulder Ext BL 2x8, 15#  2x8, 12# 2x8, 12# 2x8, 15# 2x8, 15# 2x10, 15#   90/90 Press 2x8 RTB   2x8 RTB 2x8 RTB 2x8 RTB 2x8 RTB   DB Shoulder Press Arnold 3x8, 5# ea    Arnold 3x6, 8# ea Arnold 3x8, 8# ea Arnold 3x8, 8# ea   [place] Press                    Ther Activity          Fwd Step Ups          Lateral Step Up          Gait Training                              Modalities

## 2024-01-19 ENCOUNTER — APPOINTMENT (OUTPATIENT)
Dept: PHYSICAL THERAPY | Facility: REHABILITATION | Age: 29
End: 2024-01-19
Payer: COMMERCIAL

## 2024-01-31 ENCOUNTER — OFFICE VISIT (OUTPATIENT)
Dept: PHYSICAL THERAPY | Facility: REHABILITATION | Age: 29
End: 2024-01-31
Payer: COMMERCIAL

## 2024-01-31 DIAGNOSIS — M25.511 ACUTE PAIN OF BOTH SHOULDERS: ICD-10-CM

## 2024-01-31 DIAGNOSIS — M25.512 ACUTE PAIN OF BOTH SHOULDERS: ICD-10-CM

## 2024-01-31 DIAGNOSIS — M25.561 ACUTE PAIN OF RIGHT KNEE: Primary | ICD-10-CM

## 2024-01-31 DIAGNOSIS — M54.12 CERVICAL RADICULOPATHY: ICD-10-CM

## 2024-01-31 PROCEDURE — 97164 PT RE-EVAL EST PLAN CARE: CPT

## 2024-01-31 PROCEDURE — 97110 THERAPEUTIC EXERCISES: CPT

## 2024-01-31 PROCEDURE — 97112 NEUROMUSCULAR REEDUCATION: CPT

## 2024-01-31 NOTE — PROGRESS NOTES
PT Discharge Summary     Today's date: 2024  Patient name: Uzair Jean  : 1995  MRN: [id number]  Referring provider: Hai Moeller MD  Dx:   Encounter Diagnosis     ICD-10-CM    1. Acute pain of right knee  M25.561       2. Cervical radiculopathy  M54.12       3. Acute pain of both shoulders  M25.511     M25.512           Start Time: 1450  Stop Time: 1535  Total time in clinic (min): 45 minutes  Assessment  Assessment details: Uzair Jean is a 28 y.o. female attending physical therapy for acute pain of R knee as well as cervical radiculopathy and pain of BL shoulders. Pt has been responding well to physical therapy as she displays improving functional LE/UE strength, pain free ROM, improved knee and shoulder stability, and improved LE/overhead endurance which has allowed her to return to ADLs and recreational activities without pain or functional limitations. She would like to be discharged this visit as she feels she has met her personal and functional goals. She was given an updated HEP and showed good technique with verbal understanding. Pt was also provided with education on continuing with strengthening program at the gym and noted verbal understanding. Discharge from physical therapy as of 2024.     Goals  Short term goals:   STG's to be met in 3 weeks:   1.  Decrease pain by 50% or more during functional movements to increase QoL. (MET)  2.  Knee AROM to 0-130 degrees to improve standing and transfers abilities. (MET)  3.  Improve R knee and hip strength by half grade or more to improve functional LE strength. (MET)  4.  Basic HEP, independent. (MET)    Shoulder Goals: 2023  Decreased pain by 25% or more during AROM to improve QoL. (MET)  Improve UE/postural strength by half grade or more to improve functional UE strength. (MET)  Independent with basic shoulder HEP. (MET)    Long term goals:  LTG's to be met by DC:  1.  Ambulate community distances with little to no  "difficulty. (MET)  2.  All transfers with little to no difficulty. (MET)  3.  Knee ROM to WFL to improve functional ROM and QoL. (MET)  4.  Increase strength to 5/5 in RLE to decrease risk for re-injury.  (MET)  5.  Independent with advanced HEP. (MET)  6.  Increase FOTO to that of predicted value or better. (MET)    Plan  Discharge from physical therapy as of 1/31/2024.       Subjective  HPI: Pt notes that she feels % improved as she no longer has pain at her knees or shoulders and neck. She notes no pain currently and none in the past week which has helped her return to ADLs and to the gym without many limitations. She was at the gym earlier this week and feels sore but notes no increase in pain at the knees after a leg day.     Standing         Head Position X Protracted  Neutral  Retracted   Scapular Position X Protracted  Neutral  Retracted   Thoracic Spine  Inc Kyphosis X Neutral     Lumbar Spine  Inc Lordosis X Neutral  Dec Lordosis   Pelvis  Anterior Tilt X Neutral  Posterior Tilt   Iliac Crest  L elevated X Neutral  R elevated   Scoliotic Curvature  \"C\" Curve  \"S\" Curve     Lateral Shift  Right  Left X None     Strength and ROM evaluated B from a regional biomechanical perspective and values relevant to this episode recorded in table below    ROM: Goniometric measurement revealed the following findings.  Shoulder ROM Right: 1/31/2024 Left: 1/31/2024   Flexion 180 180   Abduction 180 180   ER in Supine @90     IR T6 T6          Strength: MMT revealed the following findings.  Joint Motion Right: 12/8/2023 Left: 12/8/2023 Right: 1/31/2024 Left: 1/31/2024   Sh. Flexion 4-/5 4-/5 5/5 5/5   Sh. Abduction 4-/5 4-/5 5/5 5/5   Sh. ER 4-/5 4-/5 5/5 5/5   Sh. IR 4-/5 4-/5 5/5 5/5   Shoulder extension 3+/5 3+/5 5/5 5/5   Middle Trapezius 3+/5 3+/5 4/5 4/5   Lower Trapezius 3+/5 3+/5 4/5 4/5     Additional Assessments:  Palpation: No pain with palpation  Joint mobility: WFL  Cervical Spine Functional " ROM: WFL  ULTT: N BL shoulders                                                                                                                                                Special Test / Measure  Right: 1/31/2024 Left: 1/31/2024   [place] N N   Painful Arc N N   Infraspinatus Strength Test N N   Drop Arm N N   Supraspinatus (empty can) N N   Neers N N   ER Lag N N       Access Code: [email or web address]  URL: [email or web address]  Date: 01/31/2024  Prepared by: Nabil Acuña    Exercises  - Modified Side Plank with Hip Abduction  - 1 x daily - 7 x weekly - 3 sets - 8 reps - 2 seconds hold  - Single Leg Lunge with Foot on Bench  - 1 x daily - 7 x weekly - 3 sets - 8 reps  - Lateral Lunge  - 1 x daily - 7 x weekly - 3 sets - 8 reps  - [place]im[place] Slow  - 1 x daily - 7 x weekly - 3 sets - 10 reps  - Forward T  - 1 x daily - 7 x weekly - 2 sets - 10 reps  - Prone W Scapular Retraction  - 1 x daily - 7 x weekly - 3 sets - 10 reps  - Shoulder Flexion Serratus Activation with Resistance  - 1 x daily - 7 x weekly - 3 sets - 10 reps       Precautions: Hx of shoulder pain    POC expires Unit limit Auth Expiration date PT/OT + Visit Limit?   2/2/24 BOMN 12/31/23 25 PCY         Visit/Unit Tracking  AUTH Status:  Date 1/3 12/8 12/12 12/14 12/19 12/21 1/12 1/31   Approved Used 1 9 10 11 12 13 14 15    Remaining  24 16 15 14 13 12 23 22      Pertinent Findings:      POC End Date: Pending                                                                                          Test / Measure  11/3/2023   FOTO (Predicted 70) 42   R Knee ROM 0-100 AROM   R Knee Strength 3+ to 4/5     Appointment Number:  14 9 10 11 12 13 14 15   Manuals 1/3 12/8 12/12 12/14 12/19 12/21 1/12 1/31   R Knee PROM           Sitting R Mobilization           Shoulder GHJ Mob Inf/post, MC grade 3-4 R side  Inf/post, MC grade 3-4 L side Inf/post, MC grade 3-4 L side Inf/post, MC grade 3-4 L side Inf/post, MC grade 3-4 L side     C-Spine  Distraction                      Neuro Re-Ed           TA Recruitment + Bridge           SLR            SL Abduction           Supine Heel Slides           Tball Hip Ext Alternating           Sitting Heel Flex Stretch           SLS           Prone I/T 10x ea 10x ea 10x ea 10x ea 10x ea   10x ea   Wall Slides   10x 10x       PNF D1/2 Flex OTB 2x10 ea.  NV 2x8 ea 2x8 ea 2x8 ea 2x10, 5#    ER+Press 2x5, RTB    2x5, RTB 2x5, RTB 2x5, RTB 2x5, RTB   Wall Clocks 2x5, GTB     2x5, GTB 2x5, GTB    Ther Ex           HEP Review + Pt Edu  Re-Eval 15 min      Re-Eval + Pt Edu   Recumbent Bike UBE, 3'/3' 5 min, lvl 2 UBE, 3'/3' UBE, 3'/3' UBE, 3'/3' UBE, 3'/3' UBE, 3'/3'               STS     +Press, 3x7 8# ea +Press, 3x7 8# ea +Press, 3x7 8# ea    Modified DL           Leg Press       3x8 100#    Lunges     :Lunge Hold 2x8 Overhead press RTB Lunge Hold 2x8 Overhead press RTB Lunge Hold 2x8, 5# ea hand Lunge Hold 2x8, 5# ea hand   Standing Hip Abd/ext           Side Steps w/ TB           [place] Fwd/Bwd Ambulation           Suitcase Carry                      Shoulder Flex  10x 2x8, GTB 2x8, GTB 2x8, GTB 2x8, GTB  2x8, GTB   Shoulder Horizontal Abd  10x 2x8, BTB        BL Rows 3x8, 35#  3x8, 35# 3x8, 35# 3x8, 35# 3x8, 35# 3x8, 35# 3x8, 35#   Shoulder Ext BL 2x8, 15#  2x8, 12# 2x8, 12# 2x8, 15# 2x8, 15# 2x10, 15#    90/90 Press 2x8 RTB   2x8 RTB 2x8 RTB 2x8 RTB 2x8 RTB    DB Shoulder Press Arnold 3x8, 5# ea    Arnold 3x6, 8# ea Arnold 3x8, 8# ea Arnold 3x8, 8# ea    [place] Press                      Ther Activity           Fwd Step Ups           Lateral Step Up           Gait Training                                 Modalities

## 2024-04-19 ENCOUNTER — TELEPHONE (OUTPATIENT)
Dept: FAMILY MEDICINE CLINIC | Facility: CLINIC | Age: 29
End: 2024-04-19

## 2024-05-27 ENCOUNTER — HOSPITAL ENCOUNTER (EMERGENCY)
Facility: HOSPITAL | Age: 29
Discharge: HOME | End: 2024-05-27
Attending: EMERGENCY MEDICINE | Admitting: EMERGENCY MEDICINE

## 2024-05-27 VITALS
DIASTOLIC BLOOD PRESSURE: 72 MMHG | TEMPERATURE: 98.1 F | RESPIRATION RATE: 20 BRPM | HEART RATE: 73 BPM | OXYGEN SATURATION: 100 % | SYSTOLIC BLOOD PRESSURE: 143 MMHG

## 2024-05-27 DIAGNOSIS — L73.2 HIDRADENITIS SUPPURATIVA: Primary | ICD-10-CM

## 2024-05-27 DIAGNOSIS — R10.30 GROIN PAIN: ICD-10-CM

## 2024-05-27 LAB
BACTERIA UR QL AUTO: ABNORMAL /HPF
BILIRUB UR QL STRIP: NEGATIVE
CLARITY UR: ABNORMAL
COLOR UR: ABNORMAL
EXT PREGNANCY TEST URINE: NEGATIVE
EXT. CONTROL: NORMAL
GLUCOSE UR STRIP-MCNC: NEGATIVE MG/DL
HGB UR QL STRIP.AUTO: ABNORMAL
KETONES UR STRIP-MCNC: NEGATIVE MG/DL
LEUKOCYTE ESTERASE UR QL STRIP: ABNORMAL
NITRITE UR QL STRIP: NEGATIVE
NON-SQ EPI CELLS URNS QL MICRO: ABNORMAL /HPF
PH UR STRIP.AUTO: 5.5 [PH]
PROT UR STRIP-MCNC: NEGATIVE MG/DL
RBC #/AREA URNS AUTO: ABNORMAL /HPF
SP GR UR STRIP.AUTO: 1.02 (ref 1–1.03)
UROBILINOGEN UR STRIP-ACNC: <2 MG/DL
WBC #/AREA URNS AUTO: ABNORMAL /HPF

## 2024-05-27 PROCEDURE — 81001 URINALYSIS AUTO W/SCOPE: CPT

## 2024-05-27 PROCEDURE — 96372 THER/PROPH/DIAG INJ SC/IM: CPT

## 2024-05-27 PROCEDURE — 81025 URINE PREGNANCY TEST: CPT

## 2024-05-27 PROCEDURE — 99284 EMERGENCY DEPT VISIT MOD MDM: CPT

## 2024-05-27 PROCEDURE — 87491 CHLMYD TRACH DNA AMP PROBE: CPT

## 2024-05-27 PROCEDURE — 87591 N.GONORRHOEAE DNA AMP PROB: CPT

## 2024-05-27 PROCEDURE — 99283 EMERGENCY DEPT VISIT LOW MDM: CPT

## 2024-05-27 PROCEDURE — 87086 URINE CULTURE/COLONY COUNT: CPT

## 2024-05-27 RX ORDER — KETOROLAC TROMETHAMINE 30 MG/ML
15 INJECTION, SOLUTION INTRAMUSCULAR; INTRAVENOUS ONCE
Status: COMPLETED | OUTPATIENT
Start: 2024-05-27 | End: 2024-05-27

## 2024-05-27 RX ORDER — NAPROXEN 500 MG/1
500 TABLET ORAL 2 TIMES DAILY WITH MEALS
Qty: 30 TABLET | Refills: 0 | Status: SHIPPED | OUTPATIENT
Start: 2024-05-27

## 2024-05-27 RX ORDER — DOXYCYCLINE HYCLATE 100 MG/1
100 CAPSULE ORAL ONCE
Status: COMPLETED | OUTPATIENT
Start: 2024-05-27 | End: 2024-05-27

## 2024-05-27 RX ORDER — DOXYCYCLINE HYCLATE 100 MG/1
100 CAPSULE ORAL 2 TIMES DAILY
Qty: 14 CAPSULE | Refills: 0 | Status: SHIPPED | OUTPATIENT
Start: 2024-05-27 | End: 2024-06-03

## 2024-05-27 RX ADMIN — DOXYCYCLINE HYCLATE 100 MG: 100 CAPSULE ORAL at 22:52

## 2024-05-27 RX ADMIN — KETOROLAC TROMETHAMINE 15 MG: 30 INJECTION, SOLUTION INTRAMUSCULAR; INTRAVENOUS at 22:02

## 2024-05-28 LAB
C TRACH DNA SPEC QL NAA+PROBE: NEGATIVE
N GONORRHOEA DNA SPEC QL NAA+PROBE: NEGATIVE

## 2024-05-28 NOTE — ED PROVIDER NOTES
History  Chief Complaint   Patient presents with    Groin Pain     Has Hydrodenitis Superitiva and c/o swelling in groin area, some pain with urination     The patient is a 28 y.o. female with a history of abnormal Pap smear, hidradenitis suppurativa, HPV, axillary excision of draining hidradenitis suppurativa who presents to [facility] with a chief complaint of groin pain. Symptoms have been ongoing for months and have been constant since onset. Her pain is currently rated as a 6/10 in severity and described as sharp/burning without radiation. Associated symptoms include drainage from . Symptoms are aggravated with none noted and alleviating factors include none noted. The patient denies fever, chills, night sweats, chest pain, shortness of breath, cough, sputum, bleeding, lightheadedness, dizziness, syncope, falls, trauma. No other reported symptoms at this time.  Patient affirms allergies to percocet and oxycodone            History provided by:  Patient   used: No    Groin Pain  Associated symptoms: no abdominal pain, no chest pain, no cough, no ear pain, no fever, no rash, no shortness of breath, no sore throat and no vomiting        Prior to Admission Medications   Prescriptions Last Dose Informant Patient Reported? Taking?   methylPREDNISolone 4 MG tablet therapy pack   No No   Sig: Use as directed on package   naproxen (Naprosyn) 500 mg tablet   No No   Sig: Take 1 tablet (500 mg total) by mouth 2 (two) times a day with meals for 7 days      Facility-Administered Medications: None       Past Medical History:   Diagnosis Date    Abnormal Pap smear of cervix     Hidradenitis     gali buttocks and axilla - open areas - draining intermittently    History of COVID-19     12/21/21- no hospitalization    HPV in female     Wears glasses        Past Surgical History:   Procedure Laterality Date    AXILLARY HIDRADENITIS EXCISION Left 3/15/2022    Procedure: EXCISION HIDRADENITIS  AXILLARY;  Surgeon: Rhonda Shen MD;  Location: [facility];  Service: General    AXILLARY HIDRADENITIS EXCISION Right 9/2/2022    Procedure: EXCISION HIDRADENITIS AXILLARY;  Surgeon: Rhonda Shen MD;  [facility];  Service: General    SKIN BIOPSY      WOUND DEBRIDEMENT Bilateral 2/11/2022    Procedure: EXCISIONAL DEBRIDEMENT of bilateral buttocks;  Surgeon: Rhonda Shen MD;  Location[facility];  Service: General       Family History   Problem Relation Age of Onset    Endocrine tumor Mother         pituitary    Alcohol abuse Father     ADD / ADHD Father     Asthma Brother     Diabetes Maternal Grandmother     Hypertension Maternal Grandmother     Diabetes Paternal Grandmother      I have reviewed and agree with the history as documented.    E-Cigarette/Vaping    E-Cigarette Use Never User      E-Cigarette/Vaping Substances    Nicotine No     THC No     CBD No     Flavoring No     Other No     Unknown No      Social History     Tobacco Use    Smoking status: Some Days     Types: Cigars    Smokeless tobacco: Never   Vaping Use    Vaping status: Never Used   Substance Use Topics    Alcohol use: Yes     Comment: socially    Drug use: Yes     Frequency: 7.0 times per week     Types: Marijuana       Review of Systems   Constitutional:  Negative for chills and fever.   HENT:  Negative for ear pain and sore throat.    Eyes:  Negative for pain and visual disturbance.   Respiratory:  Negative for cough and shortness of breath.    Cardiovascular:  Negative for chest pain and palpitations.   Gastrointestinal:  Negative for abdominal pain and vomiting.   Genitourinary:  Negative for dysuria and hematuria.   Musculoskeletal:  Negative for arthralgias and back pain.   Skin:  Negative for color change and rash.        Sores   Neurological:  Negative for seizures and syncope.   All other systems reviewed and are negative.      Physical Exam  Physical Exam  Vitals reviewed.   Constitutional:       General:  She is not in acute distress.     Appearance: Normal appearance. She is not ill-appearing.   HENT:      Head: Normocephalic and atraumatic.      Mouth/Throat:      Mouth: Mucous membranes are moist.      Pharynx: Oropharynx is clear.   Eyes:      Conjunctiva/sclera: Conjunctivae normal.   Cardiovascular:      Rate and Rhythm: Normal rate.      Pulses: Normal pulses.   Pulmonary:      Effort: Pulmonary effort is normal. No respiratory distress.      Breath sounds: No stridor. No wheezing or rhonchi.   Abdominal:      General: Abdomen is flat.      Tenderness: There is no abdominal tenderness.   Musculoskeletal:         General: Normal range of motion.      Cervical back: Normal range of motion and neck supple. No tenderness.   Skin:     General: Skin is warm and dry.      Capillary Refill: Capillary refill takes less than 2 seconds.      Coloration: Skin is not jaundiced.      Findings: Abscess and lesion present.      Comments: Lesions some draining throughout the groin area.   Neurological:      Mental Status: She is alert and oriented to person, place, and time. Mental status is at baseline.         Vital Signs  ED Triage Vitals [05/27/24 1955]   Temperature Pulse Respirations Blood Pressure SpO2   98.1 °F (36.7 °C) 73 20 143/72 100 %      Temp Source Heart Rate Source Patient Position - Orthostatic VS BP Location FiO2 (%)   Oral Monitor Sitting Left arm --      Pain Score       --           Vitals:    05/27/24 1955   BP: 143/72   Pulse: 73   Patient Position - Orthostatic VS: Sitting         Visual Acuity      ED Medications  Medications   ketorolac (TORADOL) injection 15 mg (15 mg Intramuscular Given 5/27/24 2202)   doxycycline hyclate (VIBRAMYCIN) capsule 100 mg (100 mg Oral Given 5/27/24 2252)       Diagnostic Studies  Results Reviewed       Procedure Component Value Units Date/Time    Chlamydia/GC amplified DNA by PCR [[id number]] Collected: 05/27/24 2246    Lab Status: In process Updated: 05/27/24 2247     Urine Microscopic [[id number]]  (Abnormal) Collected: 05/27/24 2150    Lab Status: Final result Specimen: Urine, Clean Catch Updated: 05/27/24 2220     RBC, UA 10-20 /hpf      WBC, UA 20-30 /hpf      Epithelial Cells Occasional /hpf      Bacteria, UA None Seen /hpf     Urine culture [[id number]] Collected: 05/27/24 2150    Lab Status: In process Specimen: Urine, Clean Catch Updated: 05/27/24 2220    UA w Reflex to Microscopic w Reflex to Culture [[id number]]  (Abnormal) Collected: 05/27/24 2150    Lab Status: Final result Specimen: Urine, Clean Catch Updated: 05/27/24 2216     Color, UA Light Yellow     Clarity, UA Turbid     Specific Gravity, UA 1.017     pH, UA 5.5     Leukocytes, UA Large     Nitrite, UA Negative     Protein, UA Negative mg/dl      Glucose, UA Negative mg/dl      Ketones, UA Negative mg/dl      Urobilinogen, UA <2.0 mg/dl      Bilirubin, UA Negative     Occult Blood, UA Moderate    POCT pregnancy, urine [[id number]]  (Normal) Resulted: 05/27/24 2154    Lab Status: Final result Updated: 05/27/24 2154     EXT Preg Test, Ur Negative     Control Valid                   No orders to display              Procedures  Procedures         ED Course  ED Course as of 05/28/24 0302   Mon May 27, 2024   2155 PREGNANCY TEST URINE: Negative                               SBIRT 20yo+      Flowsheet Row Most Recent Value   Initial Alcohol Screen: US AUDIT-C     1. How often do you have a drink containing alcohol? 0 Filed at: 05/27/2024 1956   2. How many drinks containing alcohol do you have on a typical day you are drinking?  0 Filed at: 05/27/2024 1956   3b. FEMALE Any Age, or MALE 65+: How often do you have 4 or more drinks on one occassion? 0 Filed at: 05/27/2024 1956   Audit-C Score 0 Filed at: 05/27/2024 1956   PENNY: How many times in the past year have you...    Used an illegal drug or used a prescription medication for non-medical reasons? Never Filed at: 05/27/2024 1956                      Medical Decision  Making  The patient is a 28 y.o. female with a history of abnormal Pap smear, hidradenitis suppurativa, HPV, axillary excision of draining hidradenitis suppurativa who presents to [facility] with a chief complaint of groin pain.  She reports a history of hidradenitis suppurativa with lesions in the armpits as well as groin area.  Patient states that they normally drained on their own however groin is getting worse.  Patient states that she saw surgery at one point for different lesions which improved her symptoms. Patient reports some urinary symptoms as well. Urine was sent for culture.  Patient will be given doxycycline to cover both skin and urine.  Patient given general surgery referral as well as information for Dr. Shen who she has seen in the past.  Patient also given dermatology information.  Given strict return parameters.  Patient understands agrees with treatment plan and follow-up. Prior to discharge, discharge instructions were discussed with patient at bedside. Patient was provided both verbal and written instructions. Patient is understanding of the discharge instructions and is agreeable to plan of care. Return precautions were discussed with patient bedside, patient verbalized understanding of signs and symptoms that would necessitate return to the ED. All questions were answered. Patient was comfortable with the plan of care and discharged to home.       Problems Addressed:  Groin pain: acute illness or injury  Hidradenitis suppurativa: acute illness or injury    Amount and/or Complexity of Data Reviewed  Labs: ordered. Decision-making details documented in ED Course.    Risk  Prescription drug management.             Disposition  Final diagnoses:   Hidradenitis suppurativa   Groin pain     Time reflects when diagnosis was documented in both MDM as applicable and the Disposition within this note       Time User Action Codes Description Comment    5/27/2024 10:25 PM Chaim  Lex Add [L73.2] Hidradenitis suppurativa     5/27/2024 10:25 PM Lex Rucker Add [R10.30] Groin pain           ED Disposition       ED Disposition   Discharge    Condition   Stable    Date/Time   Mon May 27, 2024 2250    Comment   Uzair Jean discharge to home/self care.                   Follow-up Information       Follow up With Specialties Details Why Contact Info Additional Information    Hai Moeller MD Family Medicine Schedule an appointment as soon as possible for a visit   [address]  [address]  [facility] [address]  [phone]       [facility] Emergency Medicine  If symptoms worsen [address] [id number] [phone] [facility], [address], [place], [address]    Herman Hawley MD Dermatology Schedule an appointment as soon as possible for a visit  As needed [address] [address]  [phone]       Rhonda Shen MD General Surgery Schedule an appointment as soon as possible for a visit  As needed [address]  [facility] [address]  [phone]               Discharge Medication List as of 5/27/2024 10:50 PM        START taking these medications    Details   doxycycline hyclate (VIBRAMYCIN) 100 mg capsule Take 1 capsule (100 mg total) by mouth 2 (two) times a day for 7 days, Starting Mon 5/27/2024, Until Mon 6/3/2024, Normal           CONTINUE these medications which have CHANGED    Details   naproxen (Naprosyn) 500 mg tablet Take 1 tablet (500 mg total) by mouth 2 (two) times a day with meals, Starting Mon 5/27/2024, Normal           CONTINUE these medications which have NOT CHANGED    Details   methylPREDNISolone 4 MG tablet therapy pack Use as directed on package, Normal                 PDMP Review         Value Time User    PDMP Reviewed  Yes 2/8/2023  3:18 PM Jamey Bateman PA-C            ED Provider  Electronically  Signed by             Lex Rucker PA-C  05/28/24 0309

## 2024-05-28 NOTE — DISCHARGE INSTRUCTIONS
Follow-up with general surgery and PCP.  Warm compresses as discussed.  Take medicine as directed.  If any symptoms worsening symptoms return the ER.

## 2024-05-29 LAB — BACTERIA UR CULT: NORMAL

## 2024-07-22 ENCOUNTER — CONSULT (OUTPATIENT)
Dept: SURGERY | Facility: CLINIC | Age: 29
End: 2024-07-22
Payer: COMMERCIAL

## 2024-07-22 ENCOUNTER — TELEPHONE (OUTPATIENT)
Dept: SURGERY | Facility: CLINIC | Age: 29
End: 2024-07-22

## 2024-07-22 VITALS
RESPIRATION RATE: 16 BRPM | DIASTOLIC BLOOD PRESSURE: 78 MMHG | BODY MASS INDEX: 33.22 KG/M2 | OXYGEN SATURATION: 98 % | SYSTOLIC BLOOD PRESSURE: 126 MMHG | HEIGHT: 65 IN | HEART RATE: 71 BPM | TEMPERATURE: 97.5 F | WEIGHT: 199.4 LBS

## 2024-07-22 DIAGNOSIS — R10.30 GROIN PAIN: ICD-10-CM

## 2024-07-22 DIAGNOSIS — L73.2 HIDRADENITIS SUPPURATIVA: ICD-10-CM

## 2024-07-22 PROCEDURE — 99243 OFF/OP CNSLTJ NEW/EST LOW 30: CPT | Performed by: SURGERY

## 2024-07-22 NOTE — TELEPHONE ENCOUNTER
Pt needs an appt. Referral in chart. Tried to call to set up visit for pt but there is an issue with the number to the pod and goes to an individual on TEAMS    Please call pt to set up visit, thank you.

## 2024-07-22 NOTE — PROGRESS NOTES
Consult- General Surgery   Uzair Jean 29 y.o. female MRN: [id number]  Unit/Bed#:  Encounter: [id number]    Assessment & Plan     Assessment:  Extensive bilateral groin hidradenitis  Plan:  The patient has extensive hidradenitis on both groins with sinuses, I will refer the patient to dermatology for further management without surgical intervention.  Patient is in complete agreement.    History of Present Illness     HPI:  Uzair Jean is a 29 y.o. female who presents to my office for evaluation of hidradenitis from both groins.  The patient have noted hidradenitis since she was 21, she initially was treated by dermatologist with Cosentyx with good improvement and resolution of the symptoms.  The patient lost his insurance and she was not able to continue with the treatment.  The patient is back in the workforce with insurance and she would like to take care of this.  She stated that the wounds are present all the time, small amount of drainage.  She denies having any fever, chills or any other constitutional symptoms.  Patient stated that she had prior surgeries on both axillas and also buttocks without any improvement.    Review of Systems  The rest of the review of system total of 10 were negative except for the HPI.    Historical Information   Past Medical History:   Diagnosis Date    Abnormal Pap smear of cervix     Hidradenitis     gali buttocks and axilla - open areas - draining intermittently    History of COVID-19     12/21/21- no hospitalization    HPV in female     Wears glasses      Past Surgical History:   Procedure Laterality Date    AXILLARY HIDRADENITIS EXCISION Left 3/15/2022    Procedure: EXCISION HIDRADENITIS AXILLARY;  Surgeon: Rhonda Shen MD;  [facility];  Service: General    AXILLARY HIDRADENITIS EXCISION Right 9/2/2022    Procedure: EXCISION HIDRADENITIS AXILLARY;  Surgeon: Rhonda Shen MD;  [facility];  Service: General    SKIN BIOPSY      WOUND  "DEBRIDEMENT Bilateral 2/11/2022    Procedure: EXCISIONAL DEBRIDEMENT of bilateral buttocks;  Surgeon: Rhonda Shen MD;  [facility];  Service: General     Social History   Social History     Substance and Sexual Activity   Alcohol Use Yes    Comment: socially     Social History     Substance and Sexual Activity   Drug Use Yes    Frequency: 7.0 times per week    Types: Marijuana     Social History     Tobacco Use   Smoking Status Some Days    Types: Cigars   Smokeless Tobacco Never     Family History: non-contributory    Meds/Allergies   all medications and allergies reviewed   No current outpatient medications on file.  Allergies   Allergen Reactions    Oxycodone-Acetaminophen Drowsiness    Percolone [Oxycodone] GI Intolerance       Objective     Current Vitals:   Blood Pressure: 126/78 (07/22/24 1436)  Pulse: 71 (07/22/24 1436)  Temperature: 97.5 °F (36.4 °C) (07/22/24 1436)  Respirations: 16 (07/22/24 1436)  Height: 5' 5\" (165.1 cm) (07/22/24 1436)  Weight - Scale: 90.4 kg (199 lb 6.4 oz) (07/22/24 1436)  SpO2: 98 % (07/22/24 1436)    Physical Exam  Vitals and nursing note reviewed.   Constitutional:       General: She is not in acute distress.  Cardiovascular:      Rate and Rhythm: Normal rate and regular rhythm.   Pulmonary:      Effort: No respiratory distress.      Breath sounds: Normal breath sounds.   Abdominal:      Palpations: Abdomen is soft.      Tenderness: There is no abdominal tenderness.   Skin:     General: Skin is warm.      Coloration: Skin is not jaundiced.      Findings: No erythema or rash.      Comments: Extensive hidradenitis both groins, involves the suprapubic area, both groins, around the perineum and buttocks.  No evidence of drainage or abscesses at this time.   Neurological:      Mental Status: She is alert and oriented to person, place, and time.      Cranial Nerves: No cranial nerve deficit.   Psychiatric:         Mood and Affect: Mood normal.         Behavior: Behavior " normal.

## 2024-08-05 ENCOUNTER — HOSPITAL ENCOUNTER (EMERGENCY)
Facility: HOSPITAL | Age: 29
Discharge: HOME | End: 2024-08-05
Attending: EMERGENCY MEDICINE
Payer: COMMERCIAL

## 2024-08-05 VITALS
RESPIRATION RATE: 16 BRPM | TEMPERATURE: 98.5 F | DIASTOLIC BLOOD PRESSURE: 71 MMHG | OXYGEN SATURATION: 99 % | HEART RATE: 70 BPM | SYSTOLIC BLOOD PRESSURE: 130 MMHG

## 2024-08-05 DIAGNOSIS — N94.9 GENITAL LESION, FEMALE: Primary | ICD-10-CM

## 2024-08-05 DIAGNOSIS — L73.2 HIDRADENITIS: ICD-10-CM

## 2024-08-05 PROCEDURE — 99283 EMERGENCY DEPT VISIT LOW MDM: CPT

## 2024-08-05 PROCEDURE — 87529 HSV DNA AMP PROBE: CPT | Performed by: EMERGENCY MEDICINE

## 2024-08-05 PROCEDURE — 99284 EMERGENCY DEPT VISIT MOD MDM: CPT | Performed by: EMERGENCY MEDICINE

## 2024-08-05 RX ADMIN — MUPIROCIN 1 APPLICATION: 20 OINTMENT TOPICAL at 20:18

## 2024-08-05 NOTE — Clinical Note
Uzair Jean was seen and treated in our emergency department on 8/5/2024.                Diagnosis:     Uzair  is off the rest of the shift today, may return to work on return date.    She may return on this date: 08/12/2024         If you have any questions or concerns, please don't hesitate to call.      Ana Maria Ji, DO    ______________________________           _______________          _______________  Hospital Representative                              Date                                Time

## 2024-08-06 ENCOUNTER — TELEPHONE (OUTPATIENT)
Dept: DERMATOLOGY | Facility: CLINIC | Age: 29
End: 2024-08-06

## 2024-08-06 LAB
HSV1 DNA SPEC QL NAA+PROBE: NOT DETECTED
HSV1 DNA SPEC QL NAA+PROBE: NOT DETECTED

## 2024-08-06 NOTE — TELEPHONE ENCOUNTER
Attempted to call patient in regard to HFU appt that was scheduled but vm was not set up. Sent portal message with appt details and advised to contact the office if needs to be rescheduled.

## 2024-08-26 ENCOUNTER — OFFICE VISIT (OUTPATIENT)
Dept: OBGYN CLINIC | Facility: CLINIC | Age: 29
End: 2024-08-26
Payer: COMMERCIAL

## 2024-08-26 VITALS — DIASTOLIC BLOOD PRESSURE: 80 MMHG | BODY MASS INDEX: 33.12 KG/M2 | WEIGHT: 199 LBS | SYSTOLIC BLOOD PRESSURE: 110 MMHG

## 2024-08-26 DIAGNOSIS — N89.8 VAGINAL ODOR: ICD-10-CM

## 2024-08-26 DIAGNOSIS — L73.2 HIDRADENITIS: Primary | ICD-10-CM

## 2024-08-26 DIAGNOSIS — N94.9 GENITAL LESION, FEMALE: ICD-10-CM

## 2024-08-26 PROCEDURE — 87510 GARDNER VAG DNA DIR PROBE: CPT | Performed by: PHYSICIAN ASSISTANT

## 2024-08-26 PROCEDURE — 87480 CANDIDA DNA DIR PROBE: CPT | Performed by: PHYSICIAN ASSISTANT

## 2024-08-26 PROCEDURE — 99213 OFFICE O/P EST LOW 20 MIN: CPT | Performed by: PHYSICIAN ASSISTANT

## 2024-08-26 PROCEDURE — 87660 TRICHOMONAS VAGIN DIR PROBE: CPT | Performed by: PHYSICIAN ASSISTANT

## 2024-08-26 NOTE — PROGRESS NOTES
Assessment/Plan:      Diagnoses and all orders for this visit:    Hidradenitis    Genital lesion, female    Vaginal odor  -     VAGINOSIS DNA PROBE     Pleasant 29-year-old female with known severe hidradenitis in groin and axillae presenting today for ED follow-up 8/6 for follow-up of a lesion in the vulvar area over the clitoris.  Negative HSV culture.  Lesion/area of discomfort present for at least 3-4 weeks but has since resolved and has not reoccurred.  Denies any known history of genital herpes or known exposure.    Patient also unfortunately continues to suffer from uncontrolled hidradenitis primarily in the bilateral groin areas.  No response to oral antibiotics.  Previously followed with dermatology and prescribed Cosentyx which did show some improvement at the start but was not on therapy long enough as she had to discontinue because she lost her insurance.    On examination today there is extensive scarring with multiple areas of cysts, some open and weeping minimal yellow fluid consistent with the hidradenitis in bilateral groin areas.  Patient also has extensive scarring in bilateral axillae from the same.    Inspection of the vulvar area reveals no cyst, lump, area of swelling, redness or rash.  No evidence of solitary lesion or ulcer which is reassuring.  Vaginal exam does reveal minimal odor with some thicker yellow/white discharge for which vaginosis probe obtained to rule out infection.  Patient instructed we will call with results and treat if necessary.  STD screening back in May was negative.    Patient is aware she has an appointment scheduled with dermatology to reestablish and discuss further management of the hidradenitis on 9/12.  I did direct her to her trinket message that was sent earlier this month with the information.  Patient agrees to follow-up with them for continued management of the hidradenitis.    I did advise she consider scheduling annual exam in a few months, last Pap  smear March 2022.  Patient expresses understanding and agreeable to plan.      Chief Complaint   Patient presents with    Follow-up        Subjective:     Patient ID: Uzair Jean is a 29 y.o. female.    28y/o F here today for follow-up to ED 8/6 for flareup of hidradenitis but also painful cut/lesion over clitoral area.  HSV culture was negative in ED.    States they gave her a cream for the area (mupirocin )and didn't use it b/c said not to use on open skin or near vagina.  The area was burning, but has since subsided.    She does note a vaginal odor since then, unsure if it is from the hidradenitis or from vagina.  She denies vagianl discharge or vaginal itching.   STD screening in May was negative.  She denies any new sexual partners since that time.    She reports since ER visit, the painful lesion over the clitoris has resolved.  She states she continues with the hidradenitis issues with painful cysts that opened and drained.  States she was on cosyntex previously which did help but stopped using it because lost insurance.   She does have an appointment with dermatology to reestablish on 9/12 and is aware of the appointment.        Review of Systems   Constitutional: Negative.    Genitourinary:  Positive for genital sores. Negative for dysuria, frequency, hematuria, pelvic pain, vaginal bleeding, vaginal discharge and vaginal pain.        Positive for vaginal odor.   Skin:         Hidradenitis with frequent painful cysts that opened and drained in the groin area and bilateral axillae.         The following portions of the patient's history were reviewed and updated as appropriate: allergies, current medications, past family history, past medical history, past social history, past surgical history, and problem list.      Objective:     Physical Exam  Vitals reviewed.   Constitutional:       Appearance: Normal appearance. She is not ill-appearing.   Genitourinary:     Labia:         Right: No rash,  tenderness or lesion.         Left: No rash, tenderness or lesion.       Vagina: Vaginal discharge (thick, white/yellow with odor) present. No erythema, tenderness, bleeding or lesions.      Cervix: Normal. No cervical motion tenderness, discharge, friability, lesion, erythema or cervical bleeding.      Comments: Extensive skin distortion and scarring as well as several raised lumps with areas that are open and draining minimal yellow fluid in bilateral groin with evident sinus tracts in the skin consistent with hidradenitis.   Lymphadenopathy:      Lower Body: No right inguinal adenopathy. No left inguinal adenopathy.   Neurological:      Mental Status: She is alert and oriented to person, place, and time.   Psychiatric:         Mood and Affect: Mood normal.         Behavior: Behavior normal. Behavior is cooperative.

## 2024-08-27 ENCOUNTER — TELEPHONE (OUTPATIENT)
Age: 29
End: 2024-08-27

## 2024-08-27 DIAGNOSIS — B96.89 BV (BACTERIAL VAGINOSIS): Primary | ICD-10-CM

## 2024-08-27 DIAGNOSIS — N76.0 BV (BACTERIAL VAGINOSIS): Primary | ICD-10-CM

## 2024-08-27 LAB
CANDIDA RRNA VAG QL PROBE: NOT DETECTED
G VAGINALIS RRNA GENITAL QL PROBE: DETECTED
T VAGINALIS RRNA GENITAL QL PROBE: NOT DETECTED

## 2024-08-27 RX ORDER — METRONIDAZOLE 500 MG/1
500 TABLET ORAL EVERY 12 HOURS SCHEDULED
Qty: 14 TABLET | Refills: 0 | Status: SHIPPED | OUTPATIENT
Start: 2024-08-27 | End: 2024-09-03

## 2024-08-27 NOTE — TELEPHONE ENCOUNTER
----- Message from Silvia Garcia PA-C sent at 8/27/2024  2:35 PM EDT -----  Staff, please call patient and let her know that her vaginal culture was positive for bacterial vaginosis which is caused by an imbalance and good to bad bacteria in the vagina.  This could be contributing to the odor that she reported.  We will treat this with an oral antibiotic called Flagyl.  She will take 1 pill twice a day for 7 days and avoid alcohol with use.  This was sent to her pharmacy on file today.

## 2024-08-29 ENCOUNTER — TELEPHONE (OUTPATIENT)
Age: 29
End: 2024-08-29

## 2024-08-29 NOTE — TELEPHONE ENCOUNTER
Pt returning a call. RN provided recommendations and medication administration instructions. Pt verbalized an understanding. No further questions.

## 2024-08-30 ENCOUNTER — OFFICE VISIT (OUTPATIENT)
Dept: URGENT CARE | Facility: CLINIC | Age: 29
End: 2024-08-30
Payer: COMMERCIAL

## 2024-08-30 VITALS
RESPIRATION RATE: 18 BRPM | SYSTOLIC BLOOD PRESSURE: 135 MMHG | DIASTOLIC BLOOD PRESSURE: 77 MMHG | HEART RATE: 60 BPM | TEMPERATURE: 97.8 F | OXYGEN SATURATION: 100 %

## 2024-08-30 DIAGNOSIS — L73.2 HIDRADENITIS: Primary | ICD-10-CM

## 2024-08-30 PROCEDURE — 99214 OFFICE O/P EST MOD 30 MIN: CPT | Performed by: PHYSICIAN ASSISTANT

## 2024-08-30 PROCEDURE — S9083 URGENT CARE CENTER GLOBAL: HCPCS | Performed by: PHYSICIAN ASSISTANT

## 2024-08-30 RX ORDER — SULFAMETHOXAZOLE/TRIMETHOPRIM 800-160 MG
1 TABLET ORAL EVERY 12 HOURS SCHEDULED
Qty: 14 TABLET | Refills: 0 | Status: SHIPPED | OUTPATIENT
Start: 2024-08-30 | End: 2024-09-06

## 2024-08-30 NOTE — LETTER
August 30, 2024     Patient: Uzair Jean   YOB: 1995   Date of Visit: 8/30/2024       To Whom it May Concern:    Uzair Jean was seen in my clinic on 8/30/2024. She excuse her from work from 8/28/24 to 9/2/24     If you have any questions or concerns, please don't hesitate to call.         Sincerely,          Emilia Montgomery PA-C        CC: No Recipients

## 2024-08-30 NOTE — PROGRESS NOTES
[facility]        NAME: Uzair Jean is a 29 y.o. female  : 1995    MRN: [id number]  DATE: 2024  TIME: 6:08 PM    Assessment and Plan   Hidradenitis [L73.2]  1. Hidradenitis  sulfamethoxazole-trimethoprim (BACTRIM DS) 800-160 mg per tablet            Patient Instructions   Take Bactrim as prescribed.  Continue to use warm compresses.  Follow-up with Derm next month.  To the ER with any worsening.  If tests have been performed at [facility], our office will contact you with results if changes need to be made to the care plan discussed with you at the visit.  You can review your full results on [facility]  Follow up with PCP in 3-5 days.  Proceed to  ER if symptoms worsen.    Chief Complaint     Chief Complaint   Patient presents with    HS Flare     Patient here with HS flare which she states has kept her out of work for the last 3 days due to pain.          History of Present Illness       HPI  This is a 29-year-old female here with a Hydro nidus flare.  She notes it has been occurring for the last 3 days and she has missed work.  She has tried heating compresses.  She notes no over-the-counter medications have helped.  She notes it is along her buttocks crack.  She does have an appointment to reestablish with Derm next month.  She denies fever, vomiting, diarrhea, shortness of breath or chest pain.  Review of Systems   Review of Systems   Constitutional:  Negative for fever.   Respiratory:  Negative for shortness of breath.    Cardiovascular:  Negative for chest pain.   Skin:  Positive for rash.         Current Medications       Current Outpatient Medications:     metroNIDAZOLE (FLAGYL) 500 mg tablet, Take 1 tablet (500 mg total) by mouth every 12 (twelve) hours for 7 days Please avoid alcohol while taking medication., Disp: 14 tablet, Rfl: 0    sulfamethoxazole-trimethoprim (BACTRIM DS) 800-160 mg per tablet, Take 1 tablet by mouth every 12 (twelve) hours for 7 days, Disp: 14  tablet, Rfl: 0    Current Allergies     Allergies as of 08/30/2024 - Reviewed 08/30/2024   Allergen Reaction Noted    Oxycodone-acetaminophen Drowsiness 07/03/2018    Percolone [oxycodone] GI Intolerance 06/01/2017            The following portions of the patient's history were reviewed and updated as appropriate: allergies, current medications, past family history, past medical history, past social history, past surgical history and problem list.     Past Medical History:   Diagnosis Date    Abnormal Pap smear of cervix     Hidradenitis     gali buttocks and axilla - open areas - draining intermittently    History of COVID-19     12/21/21- no hospitalization    HPV in female     Wears glasses        Past Surgical History:   Procedure Laterality Date    AXILLARY HIDRADENITIS EXCISION Left 3/15/2022    Procedure: EXCISION HIDRADENITIS AXILLARY;  Surgeon: Rhonda Shen MD;  Location: [facility];  Service: General    AXILLARY HIDRADENITIS EXCISION Right 9/2/2022    Procedure: EXCISION HIDRADENITIS AXILLARY;  Surgeon: Rhonda Shen MD;  Location: [facility];  Service: General    SKIN BIOPSY      WOUND DEBRIDEMENT Bilateral 2/11/2022    Procedure: EXCISIONAL DEBRIDEMENT of bilateral buttocks;  Surgeon: Rhonda Shen MD;  Location: [facility];  Service: General       Family History   Problem Relation Age of Onset    Endocrine tumor Mother         pituitary    Alcohol abuse Father     ADD / ADHD Father     Asthma Brother     Diabetes Maternal Grandmother     Hypertension Maternal Grandmother     Diabetes Paternal Grandmother          Medications have been verified.        Objective   /77   Pulse 60   Temp 97.8 °F (36.6 °C)   Resp 18   LMP 08/18/2024 (Approximate)   SpO2 100%        Physical Exam     Physical Exam  Vitals and nursing note reviewed.   Cardiovascular:      Rate and Rhythm: Normal rate and regular rhythm.   Pulmonary:      Effort: Pulmonary effort is normal.      Breath sounds: Normal  breath sounds.   Skin:     Comments: There is multiple inflamed areas of hidradenitis tracking along the gluteal fold bilaterally.

## 2024-09-11 NOTE — ED PROVIDER NOTES
1. Genital lesion, female    2. Hidradenitis      ED Disposition       ED Disposition   Discharge    Condition   Stable    Date/Time   Mon Aug 5, 2024  8:39 PM    Comment   Uzair Jean discharge to home/self care.                   Assessment & Plan       Medical Decision Making  29-year-old female presents for evaluation with a painful lesion on her clitoris.  On exam, patient does have an ulcerative lesion noted on the clitoral espinoza.  Do not believe this is related to her hidradenitis suppurativa as this is an unlikely location for a flareup.  Did speak with on-call dermatology resident who suggested using mupirocin on the area.  HSV swab was obtained to rule out possible herpes infection.  Patient was instructed to follow-up with her gynecologist for further evaluation.  Patient given symptomatic care instructions, and discharged home in stable condition with strict ED return precautions.    Amount and/or Complexity of Data Reviewed  Labs: ordered.    Risk  Prescription drug management.                   Medications   mupirocin (BACTROBAN) 2 % ointment (1 Application Topical Given 8/5/24 2018)       History of Present Illness       29-year-old female with a past medical history of hidradenitis suppurativa presents for evaluation with a painful lesion on her clitoris.  Patient states that the lesion has been worsening over the past few days.  She believes that the lesion is secondary to her hidradenitis suppurativa.  She denies any other associated dental lesions.  Denies any vaginal discharge.  Denies any associated fevers or chills.        Review of Systems   Constitutional:  Negative for chills and fever.   Respiratory:  Negative for shortness of breath.    Cardiovascular:  Negative for chest pain.   Gastrointestinal:  Negative for abdominal pain, nausea and vomiting.   Genitourinary:  Positive for genital sores. Negative for vaginal discharge.   Skin:  Negative for rash.   Neurological:  Negative for  dizziness and headaches.   All other systems reviewed and are negative.          Objective     ED Triage Vitals   Temperature Pulse Blood Pressure Respirations SpO2 Patient Position - Orthostatic VS   08/05/24 1833 08/05/24 1833 08/05/24 1833 08/05/24 1833 08/05/24 1833 08/05/24 2043   98.5 °F (36.9 °C) 77 138/86 18 100 % Lying      Temp Source Heart Rate Source BP Location FiO2 (%) Pain Score    08/05/24 1833 08/05/24 1833 08/05/24 2043 -- 08/05/24 1833    Temporal Monitor Right arm  8        Physical Exam  Vitals and nursing note reviewed. Exam conducted with a chaperone present.   Constitutional:       General: She is awake. She is not in acute distress.     Appearance: She is not toxic-appearing.   HENT:      Head: Normocephalic and atraumatic.   Eyes:      General: Vision grossly intact. Gaze aligned appropriately.   Cardiovascular:      Rate and Rhythm: Normal rate and regular rhythm.   Pulmonary:      Effort: Pulmonary effort is normal. No respiratory distress.   Genitourinary:     Comments: Superficial ulcerative lesion noted on the clitoral espinoza.  Area is reportedly tender to palpation.  Musculoskeletal:      Cervical back: Full passive range of motion without pain and neck supple.   Skin:     General: Skin is warm and dry.   Neurological:      General: No focal deficit present.      Mental Status: She is alert and oriented to person, place, and time.         Labs Reviewed   HSV TYPE 1,2 DNA PCR [facility] ONLY - Normal    Narrative:     This test has been performed using RT-PCR on the [employer] Molecular Simplexa. This test has been FDA cleared, validated by the , and verified by the performing laboratory.  The [employer] Molecular Simplexa HSV 1 & 2 Direct is intended for use on the [employer] instrument for the qualitative detection and differentiation of HSV-1 and HSV-2 DNA in mucocutaneous and cutaneous lesion swabs and cerebrospinal fluid (CSF) of patients with suspected infection. Conserved  regions of HSV-1 and HSV-2 DNA polymerase genes are targeted to identify HSV-1 and HSV-2 DNA, respectively. Results are for the presumptive identification of HSV-1 and/or HSV-2. This test is intended for use in conjunction with clinical presentation and other laboratory markers for the clinical management of HSV-1/HSV-2 infected patients.     Positive results are indicative of infection, but do not rule out bacterial infection or co-infection with other viruses.     Negative results do not preclude viral infection and should not be used as the sole basis for treatment or other patient management decisions. Negative results must be combined with clinical observations, patient history, and epidemiological information.   Invalid or inconclusive results do not preclude infection. Recollection recommended.    Procedural Limitations:  Viral nucleic acid detection is dependent on sample collection, transport, handling, storage, and preparation.  Detection of target analytes(s) does not imply that the corresponding viruses are infectious or are the causative agent for clinical symptoms.     False-negative results may occur if the viruses are present at a level below the analytical sensitivity of the assay, if the virus has genomic mutations, or if the test is performed very early in the course of illness.  When very high levels of HSV-1 are present with very low levels of HSV-2, the signal from the HSV-2 reaction may not be adequate to be detected.  This test has not been established for screening of blood or blood products for the presence of HSV.       No orders to display       Procedures       Ana Maria Ji DO  09/11/24 1044

## 2024-09-12 ENCOUNTER — APPOINTMENT (OUTPATIENT)
Dept: LAB | Facility: CLINIC | Age: 29
End: 2024-09-12
Payer: COMMERCIAL

## 2024-09-12 ENCOUNTER — TELEPHONE (OUTPATIENT)
Dept: DERMATOLOGY | Facility: CLINIC | Age: 29
End: 2024-09-12

## 2024-09-12 ENCOUNTER — OFFICE VISIT (OUTPATIENT)
Dept: DERMATOLOGY | Facility: CLINIC | Age: 29
End: 2024-09-12
Payer: COMMERCIAL

## 2024-09-12 VITALS — HEIGHT: 65 IN | TEMPERATURE: 97.9 F | WEIGHT: 198.8 LBS | BODY MASS INDEX: 33.12 KG/M2

## 2024-09-12 DIAGNOSIS — Z79.899 HIGH RISK MEDICATION USE: ICD-10-CM

## 2024-09-12 DIAGNOSIS — L73.2 HIDRADENITIS SUPPURATIVA: ICD-10-CM

## 2024-09-12 DIAGNOSIS — L73.2 HIDRADENITIS SUPPURATIVA: Primary | ICD-10-CM

## 2024-09-12 DIAGNOSIS — Z11.3 SCREEN FOR STD (SEXUALLY TRANSMITTED DISEASE): ICD-10-CM

## 2024-09-12 LAB
HBV CORE AB SER QL: NORMAL
HBV SURFACE AB SER-ACNC: 11.4 MIU/ML
HBV SURFACE AG SER QL: NORMAL
HIV 1+2 AB+HIV1 P24 AG SERPL QL IA: NORMAL
HIV 2 AB SERPL QL IA: NORMAL
HIV1 AB SERPL QL IA: NORMAL
HIV1 P24 AG SERPL QL IA: NORMAL
TREPONEMA PALLIDUM IGG+IGM AB [PRESENCE] IN SERUM OR PLASMA BY IMMUNOASSAY: NORMAL

## 2024-09-12 PROCEDURE — 87340 HEPATITIS B SURFACE AG IA: CPT

## 2024-09-12 PROCEDURE — 86780 TREPONEMA PALLIDUM: CPT

## 2024-09-12 PROCEDURE — 87389 HIV-1 AG W/HIV-1&-2 AB AG IA: CPT

## 2024-09-12 PROCEDURE — 87591 N.GONORRHOEAE DNA AMP PROB: CPT

## 2024-09-12 PROCEDURE — 86480 TB TEST CELL IMMUN MEASURE: CPT

## 2024-09-12 PROCEDURE — 36415 COLL VENOUS BLD VENIPUNCTURE: CPT

## 2024-09-12 PROCEDURE — 87491 CHLMYD TRACH DNA AMP PROBE: CPT

## 2024-09-12 PROCEDURE — 86706 HEP B SURFACE ANTIBODY: CPT

## 2024-09-12 PROCEDURE — 99213 OFFICE O/P EST LOW 20 MIN: CPT | Performed by: DERMATOLOGY

## 2024-09-12 PROCEDURE — 86704 HEP B CORE ANTIBODY TOTAL: CPT

## 2024-09-12 NOTE — PROGRESS NOTES
"[facility] Dermatology Clinic Note     Patient Name: Uzair Jean  Encounter Date: 9/12/24      Have you been cared for by a [facility] Dermatologist in the last 3 years and, if so, which description applies to you?     Yes.  I have been here within the last 3 years, and my medical history has NOT changed since that time.  I am FEMALE/of child-bearing potential.    REVIEW OF SYSTEMS:  Have you recently had or currently have any of the following? No changes in my recent health.   PAST MEDICAL HISTORY:  Have you personally ever had or currently have any of the following?  If \"YES,\" then please provide more detail. No changes in my medical history.   HISTORY OF IMMUNOSUPPRESSION: Do you have a history of any of the following:  Systemic Immunosuppression such as Diabetes, Biologic or Immunotherapy, Chemotherapy, Organ Transplantation, Bone Marrow Transplantation or Prednisone?  No      Answering \"YES\" requires the addition of the dotphrase \"IMMUNOSUPPRESSED\" as the first diagnosis of the patient's visit.   FAMILY HISTORY:  Any \"first degree relatives\" (parent, brother, sister, or child) with the following?    No changes in my family's known health.   PATIENT EXPERIENCE:    Do you want the Dermatologist to perform a COMPLETE skin exam today including a clinical examination under the \"bra and underwear\" areas?  NO  If necessary, do we have your permission to call and leave a detailed message on your Preferred Phone number that includes your specific medical information?  Yes      Allergies        Allergies   Allergen Reactions    Oxycodone-Acetaminophen Drowsiness    Percolone [Oxycodone] GI Intolerance       Current Medications   No current outpatient medications on file.               Whom besides the patient is providing clinical information about today's encounter?   NO ADDITIONAL HISTORIAN (patient alone provided history)     Physical Exam and Assessment/Plan by Diagnosis:     HIDRADENITIS SUPPURATIVA FOLLOW UP   " "  Physical Exam:  Anatomic Location Affected:  buttocks, groin, bilateral axilla  Morphological Description:  multiple draining nodules with sinus tracts, crandall stage 3  Pertinent Positives:  Pertinent Negatives:     Additional History of Present Condition:  patient is here for a follow up of HS. Patient was last evaluated by [facility] 12/06/22. Dr. Fish had recommend Humira due to HS stage Hurly 3. Patient was to get blood work in order to start Humira. Patient previously tried Clindamycin lotion for 3 months, Hibiclens, spironolactone 100 mg for 6 months, and doxy 100 mg tried for about 2 months failed due to ineffectiveness and side effect of headaches. Patient had excision done in the axilla in 2022. Patient is currently on Metronidazole 500 mg BID for BV. Patient reported she was on Consentyx prescribed by a different Dermatologist in [place] \"[facility]\". She's off the Consentyx due to insurance lapse. Patient reported that she only did Consentyx for a month, she would like to go back on Consentyx. Denies smoking tobacco. Currently on Mupirocin no medical History or family history of MS.        Assessment and Plan:  Based on a thorough discussion of this condition and the management approach to it (including a comprehensive discussion of the known risks, side effects and potential benefits of treatment), the patient (family) agrees to implement the following specific plan:     Discussed restarting the Consentyx.  Advised that both Consentyx and Humira can lower your immune system.  Difference of Medications is how often injection is administered. Humira is every 2 weeks. Consentyx is administered every month.  Will need to Obtain Prior Authorization. Once we receive the result from the blood work we will submit the prior authorization  Labs ordered. TB and Hepatitis surface and core antibodies and Antigen.  Follow up on Friday 12/6/24. @ 1:40 pm.     What is hidradenitis suppurativa?  Hidradenitis " suppurativa is an inflammatory skin disease that affects apocrine gland-bearing skin in the axillae, in the groin, and under the breasts. It is characterised by recurrent boil-like nodules and abscesses that culminate in pus-like discharge, difficult-to-heal open wounds (sinuses) and scarring. Hidradenitis suppurativa also has significant psychological impact and many patients suffer from impairment of body image, depression and anxiety.      The term hidradenitis implies it starts as an inflammatory disorder of sweat glands, which is now known to be incorrect. Hidradenitis suppurativa is also known as acne inversa.     Who gets hidradenitis suppurativa?  Hidradenitis often starts at puberty, and is most active between the ages of 20 and 40 years, and in women, can resolve at menopause. It is three times more common in females than in males. Risk factors include:  Other family members with hidradenitis suppurativa  Obesity and insulin resistance/metabolic syndrome  Cigarette smoking  Follicular occlusion disorders: acne conglobata, dissecting cellulitis, pilonidal sinus  Inflammatory bowel disease (Crohn disease)  Rare autoinflammatory syndromes associated with abnormalities of PSTPIP1 gene.*     * PAPA syndrome (pyogenic arthritis, pyoderma gangrenosum and acne), PASH syndrome (pyoderma gangrenosum, acne, suppurative hidradenitis) and PAPASH syndrome (pyogenic arthritis, pyoderma gangrenosum, acne, suppurative hidradenitis).     Abram Cisneros MD  PGY-II Dermatology Resident         Scribe Attestation       I,:  Crissy Maradiaga MA am acting as a scribe while in the presence of the attending physician.:       I,:  Greta Wolfe MD personally performed the services described in this documentation    as scribed in my presence.:

## 2024-09-12 NOTE — PROGRESS NOTES
"[facility] Dermatology Clinic Note     Patient Name: Uzair Jean  Encounter Date: 9/12/24     Have you been cared for by a [facility] Dermatologist in the last 3 years and, if so, which description applies to you?    Yes.  I have been here within the last 3 years, and my medical history has NOT changed since that time.  I am FEMALE/of child-bearing potential.    REVIEW OF SYSTEMS:  Have you recently had or currently have any of the following? No changes in my recent health.   PAST MEDICAL HISTORY:  Have you personally ever had or currently have any of the following?  If \"YES,\" then please provide more detail. No changes in my medical history.   HISTORY OF IMMUNOSUPPRESSION: Do you have a history of any of the following:  Systemic Immunosuppression such as Diabetes, Biologic or Immunotherapy, Chemotherapy, Organ Transplantation, Bone Marrow Transplantation or Prednisone?  No     Answering \"YES\" requires the addition of the dotphrase \"IMMUNOSUPPRESSED\" as the first diagnosis of the patient's visit.   FAMILY HISTORY:  Any \"first degree relatives\" (parent, brother, sister, or child) with the following?    No changes in my family's known health.   PATIENT EXPERIENCE:    Do you want the Dermatologist to perform a COMPLETE skin exam today including a clinical examination under the \"bra and underwear\" areas?  NO  If necessary, do we have your permission to call and leave a detailed message on your Preferred Phone number that includes your specific medical information?  Yes      Allergies   Allergen Reactions    Oxycodone-Acetaminophen Drowsiness    Percolone [Oxycodone] GI Intolerance    No current outpatient medications on file.          Whom besides the patient is providing clinical information about today's encounter?   NO ADDITIONAL HISTORIAN (patient alone provided history)    Physical Exam and Assessment/Plan by Diagnosis:    HIDRADENITIS SUPPURATIVA FOLLOW UP    Physical Exam:  Anatomic Location Affected:  " "buttocks, groin, bilateral axilla  Morphological Description:  multiple draining nodules with sinus tracts  Pertinent Positives:  Pertinent Negatives:    Additional History of Present Condition:  patient is here for a follow up of HS. Patient was last evaluated by [facility] 12/06/22. Dr. Fish had recommend Humira due to HS stage Hurly 3. Patient was to get blood work in order to start Humira. Patient previously tried Clindamycin lotion for 3 months, Hibiclens, spironolactone 100 mg for 6 months, and doxy 100 mg tried for about 2 months failed due to ineffectiveness and side effect of headaches. Patient had excision done in the axilla in 2022. Patient is currently on Metronidazole 500 mg BID for BV. Patient reported she was on Consentyx prescribed by a different Dermatologist in [place] \"[facility]\". She's off the Consentyx due to insurance lapse. Patient reported that she only did Consentyx for a month, she would like to go back on Consentyx. Denies smoking tobacco. Currently on Mupirocin no medical History or family history of MS.      Assessment and Plan:  Based on a thorough discussion of this condition and the management approach to it (including a comprehensive discussion of the known risks, side effects and potential benefits of treatment), the patient (family) agrees to implement the following specific plan:    Discussed restarting the Consentyx.  Advised that both Consentyx and Humira can lower your immune system.  Difference of Medications is how often injection is administered. Humira is every 2 weeks. Consentyx is administered every month.  Will need to Obtain Prior Authorization. Once we receive the result from the blood work we will submit the prior authorization  Labs ordered. TB and Hepatitis surface and core antibodies and Antigen.  Follow up on Friday 12/6/24. @ 1:40 pm.    What is hidradenitis suppurativa?  Hidradenitis suppurativa is an inflammatory skin disease that affects apocrine " gland-bearing skin in the axillae, in the groin, and under the breasts. It is characterised by recurrent boil-like nodules and abscesses that culminate in pus-like discharge, difficult-to-heal open wounds (sinuses) and scarring. Hidradenitis suppurativa also has significant psychological impact and many patients suffer from impairment of body image, depression and anxiety.     The term hidradenitis implies it starts as an inflammatory disorder of sweat glands, which is now known to be incorrect. Hidradenitis suppurativa is also known as acne inversa.    Who gets hidradenitis suppurativa?  Hidradenitis often starts at puberty, and is most active between the ages of 20 and 40 years, and in women, can resolve at menopause. It is three times more common in females than in males. Risk factors include:  Other family members with hidradenitis suppurativa  Obesity and insulin resistance/metabolic syndrome  Cigarette smoking  Follicular occlusion disorders: acne conglobata, dissecting cellulitis, pilonidal sinus  Inflammatory bowel disease (Crohn disease)  Rare autoinflammatory syndromes associated with abnormalities of PSTPIP1 gene.*    * PAPA syndrome (pyogenic arthritis, pyoderma gangrenosum and acne), PASH syndrome (pyoderma gangrenosum, acne, suppurative hidradenitis) and PAPASH syndrome (pyogenic arthritis, pyoderma gangrenosum, acne, suppurative hidradenitis).    Abram Cisneros MD  PGY-II Dermatology Resident       Scribe Attestation      I,:  Crissy Maradiaga MA am acting as a scribe while in the presence of the attending physician.:       I,:  Greta Wolfe MD personally performed the services described in this documentation    as scribed in my presence.:

## 2024-09-12 NOTE — PATIENT INSTRUCTIONS
HIDRADENITIS SUPPURATIVA FOLLOW UP    Assessment and Plan:  Based on a thorough discussion of this condition and the management approach to it (including a comprehensive discussion of the known risks, side effects and potential benefits of treatment), the patient (family) agrees to implement the following specific plan:    Discussed restarting the Consentyx.  Advised that both Consentyx and Humira can lower your immune system.  Difference of Medications is how often injection is administered. Humira is every 2 weeks. Consentyx is administered every month.  Will need to Obtain Prior Authorization. Once we receive the result from the blood work we will submit the prior authorization  Labs ordered. TB and Hepatitis surface and core antibodies and Antigen.  Follow up on Friday 12/6/24. @ 1:40 pm.    What is hidradenitis suppurativa?  Hidradenitis suppurativa is an inflammatory skin disease that affects apocrine gland-bearing skin in the axillae, in the groin, and under the breasts. It is characterised by recurrent boil-like nodules and abscesses that culminate in pus-like discharge, difficult-to-heal open wounds (sinuses) and scarring. Hidradenitis suppurativa also has significant psychological impact and many patients suffer from impairment of body image, depression and anxiety.     The term hidradenitis implies it starts as an inflammatory disorder of sweat glands, which is now known to be incorrect. Hidradenitis suppurativa is also known as acne inversa.    Who gets hidradenitis suppurativa?  Hidradenitis often starts at puberty, and is most active between the ages of 20 and 40 years, and in women, can resolve at menopause. It is three times more common in females than in males. Risk factors include:  Other family members with hidradenitis suppurativa  Obesity and insulin resistance/metabolic syndrome  Cigarette smoking  Follicular occlusion disorders: acne conglobata, dissecting cellulitis, pilonidal  sinus  Inflammatory bowel disease (Crohn disease)  Rare autoinflammatory syndromes associated with abnormalities of PSTPIP1 gene.*    * PAPA syndrome (pyogenic arthritis, pyoderma gangrenosum and acne), PASH syndrome (pyoderma gangrenosum, acne, suppurative hidradenitis) and PAPASH syndrome (pyogenic arthritis, pyoderma gangrenosum, acne, suppurative hidradenitis).

## 2024-09-13 ENCOUNTER — TELEPHONE (OUTPATIENT)
Dept: DERMATOLOGY | Facility: CLINIC | Age: 29
End: 2024-09-13

## 2024-09-13 LAB
C TRACH DNA SPEC QL NAA+PROBE: NEGATIVE
GAMMA INTERFERON BACKGROUND BLD IA-ACNC: 0.03 IU/ML
M TB IFN-G BLD-IMP: NEGATIVE
M TB IFN-G CD4+ BCKGRND COR BLD-ACNC: -0.02 IU/ML
M TB IFN-G CD4+ BCKGRND COR BLD-ACNC: -0.02 IU/ML
MITOGEN IGNF BCKGRD COR BLD-ACNC: 6.04 IU/ML
N GONORRHOEA DNA SPEC QL NAA+PROBE: NEGATIVE

## 2024-09-16 ENCOUNTER — TELEPHONE (OUTPATIENT)
Age: 29
End: 2024-09-16

## 2024-09-16 DIAGNOSIS — L73.2 HIDRADENITIS SUPPURATIVA: Primary | ICD-10-CM

## 2024-09-16 NOTE — TELEPHONE ENCOUNTER
----- Message from Abram Cisneros MD sent at 9/13/2024  1:02 PM EDT -----  Hi can we please submit a PA for Cosentyx for this patient? She has tried and failed hibiclens, clindamycin, doxycycline, spironolactone, and surgery. Thanks!

## 2024-09-16 NOTE — TELEPHONE ENCOUNTER
PA for Cosentyx 300mg pen SUBMITTED     via    []CMM-KEY:   []Surescripts-Case ID #   []Faxed to plan   [x]Other website [facility] Portal- Prior Auth (EOC) ID:  [id number]  []Phone call Case ID #     Office notes sent, clinical questions answered. Awaiting determination    Turnaround time for your insurance to make a decision on your Prior Authorization can take 7-21 business days.

## 2024-09-20 ENCOUNTER — APPOINTMENT (OUTPATIENT)
Dept: URGENT CARE | Facility: CLINIC | Age: 29
End: 2024-09-20

## 2024-09-20 NOTE — TELEPHONE ENCOUNTER
PA for Cosentyx 300mg auto injector  DENIED    Reason:(Screenshot if applicable)        Message sent to office clinical pool Yes    Denial letter scanned into Media Yes    Appeal started No (Provider will need to decide if appeal is warranted and send clinical documentation to Prior Authorization Team for initiation.)    **Please follow up with your patient regarding denial and next steps**

## 2024-09-20 NOTE — TELEPHONE ENCOUNTER
Please obtain pa for humira for HS    Hidradenitis suppurativa.. Inject 160 mg subcutaneously on day 1. Inject 80 mg every 2 weeks starting on day 15.

## 2024-09-23 NOTE — TELEPHONE ENCOUNTER
PA for Humira 80mg auto injector SUBMITTED     via    []CMM-KEY:   []Surescripts-Case ID #   []Faxed to plan   [x]Other website [facility] Portal- Prior Auth (EOC) ID:  [id number]  []Phone call Case ID #     Office notes sent, clinical questions answered. Awaiting determination    Turnaround time for your insurance to make a decision on your Prior Authorization can take 7-21 business days.

## 2024-09-27 RX ORDER — ADALIMUMAB 80MG/0.8ML
KIT SUBCUTANEOUS
Qty: 2 EACH | Refills: 10 | OUTPATIENT
Start: 2024-09-27

## 2024-09-27 RX ORDER — ADALIMUMAB 80MG/0.8ML
KIT SUBCUTANEOUS
Qty: 4 EACH | Refills: 0 | OUTPATIENT
Start: 2024-09-27

## 2024-09-27 NOTE — TELEPHONE ENCOUNTER
PA for Humira 80mg  auto injector APPROVED     Date(s) approved 09/23/2025    Case #[id number]    Patient advised by          [x][employer] Message  []Phone call   []LMOM  []L/M to call office as no active Communication consent on file  []Unable to leave detailed message as VM not approved on Communication consent       Pharmacy advised by    [x]Fax  []Phone call    Approval letter scanned into Media Yes

## 2024-10-08 ENCOUNTER — OFFICE VISIT (OUTPATIENT)
Dept: URGENT CARE | Facility: CLINIC | Age: 29
End: 2024-10-08
Payer: COMMERCIAL

## 2024-10-08 VITALS
TEMPERATURE: 98 F | BODY MASS INDEX: 32.99 KG/M2 | OXYGEN SATURATION: 99 % | HEART RATE: 89 BPM | DIASTOLIC BLOOD PRESSURE: 84 MMHG | WEIGHT: 198 LBS | HEIGHT: 65 IN | SYSTOLIC BLOOD PRESSURE: 129 MMHG | RESPIRATION RATE: 18 BRPM

## 2024-10-08 DIAGNOSIS — L73.2 HIDRADENITIS: Primary | ICD-10-CM

## 2024-10-08 PROCEDURE — S9083 URGENT CARE CENTER GLOBAL: HCPCS | Performed by: PHYSICIAN ASSISTANT

## 2024-10-08 PROCEDURE — 99212 OFFICE O/P EST SF 10 MIN: CPT | Performed by: PHYSICIAN ASSISTANT

## 2024-10-08 RX ORDER — CEPHALEXIN 500 MG/1
500 CAPSULE ORAL EVERY 8 HOURS SCHEDULED
Qty: 21 CAPSULE | Refills: 0 | Status: SHIPPED | OUTPATIENT
Start: 2024-10-08 | End: 2024-10-15

## 2024-10-08 NOTE — PROGRESS NOTES
[facility]        NAME: Uzair Jean is a 29 y.o. female  : 1995    MRN: [id number]  DATE: 2024  TIME: 2:09 PM    Assessment and Plan   Hidradenitis [L73.2]  1. Hidradenitis  cephalexin (KEFLEX) 500 mg capsule            Patient Instructions     Patient Instructions   Recommended oral antibiotics.  Will try Keflex as patient reports that Bactrim and Doxy have not helped her in the past.  Recommended warm compresses daily to the area.  Advised to follow-up with dermatology as scheduled.    Follow up with PCP in 3-5 days.  Proceed to  ER if symptoms worsen.    If tests are performed, our office will contact you with results only if changes need to made to the care plan discussed with you at the visit. [facility]        Chief Complaint     Chief Complaint   Patient presents with    Pain     2 days painful flare up of Hidradenitis.          History of Present Illness       Patient presents today for evaluation of a flareup of hidradenitis.  She states that she has a history of this condition and recently reestablished with dermatology.  She has been seen for flareups of this condition before.  It usually causes her a lot of pain.  This flareup has been going on for the past 2 days and it has become incredibly painful for her.  He states that in the past she has been giving doxycycline which does not help.  Her most recent antibiotic was Bactrim which did not help.  He has also been on spironolactone which did not help.  Her dermatologist recently got her a prescription for Humira and she is just waiting for the pharmacy to fill it.        Review of Systems   Review of Systems   Skin:  Positive for rash.   All other systems reviewed and are negative.        Current Medications       Current Outpatient Medications:     Adalimumab (Humira, 2 Pen,) 80 MG/0.8ML PNKT, Inject 80mg on week 6 then every other week there after for maintenance dose.,  Disp: 2 each, Rfl: 10    Adalimumab (Humira, 2 Pen,) 80 MG/0.8ML PNKT, Inject 160mg on week 0 for loading dose then 80mg on week 2 and week 4 for maintenance dose., Disp: 4 each, Rfl: 0    cephalexin (KEFLEX) 500 mg capsule, Take 1 capsule (500 mg total) by mouth every 8 (eight) hours for 7 days, Disp: 21 capsule, Rfl: 0    Current Allergies     Allergies as of 10/08/2024 - Reviewed 10/08/2024   Allergen Reaction Noted    Oxycodone-acetaminophen Drowsiness 07/03/2018    Percolone [oxycodone] GI Intolerance 06/01/2017            The following portions of the patient's history were reviewed and updated as appropriate: allergies, current medications, past family history, past medical history, past social history, past surgical history and problem list.     Past Medical History:   Diagnosis Date    Abnormal Pap smear of cervix     Hidradenitis     gali buttocks and axilla - open areas - draining intermittently    History of COVID-19     12/21/21- no hospitalization    HPV in female     Wears glasses        Past Surgical History:   Procedure Laterality Date    AXILLARY HIDRADENITIS EXCISION Left 3/15/2022    Procedure: EXCISION HIDRADENITIS AXILLARY;  Surgeon: Rhonda Shen MD;  Location: [facility];  Service: General    AXILLARY HIDRADENITIS EXCISION Right 9/2/2022    Procedure: EXCISION HIDRADENITIS AXILLARY;  Surgeon: Rhonda Shen MD;  Location: [facility];  Service: General    SKIN BIOPSY      WOUND DEBRIDEMENT Bilateral 2/11/2022    Procedure: EXCISIONAL DEBRIDEMENT of bilateral buttocks;  Surgeon: Rhonda Shen MD;  Location: [facility];  Service: General       Family History   Problem Relation Age of Onset    Endocrine tumor Mother         pituitary    Alcohol abuse Father     ADD / ADHD Father     Asthma Brother     Diabetes Maternal Grandmother     Hypertension Maternal Grandmother     Diabetes Paternal Grandmother          Medications have been verified.        Objective   /84   Pulse 89    "Temp 98 °F (36.7 °C)   Resp 18   Ht 5' 5\" (1.651 m)   Wt 89.8 kg (198 lb)   SpO2 99%   BMI 32.95 kg/m²        Physical Exam     Physical Exam  Vitals and nursing note reviewed.   Constitutional:       Appearance: Normal appearance.   Genitourinary:     Comments: Multiple swollen lesions with purulent discharge along the right groin.  Erythema noted.  No obvious bleeding.  Neurological:      General: No focal deficit present.      Mental Status: She is alert and oriented to person, place, and time.   Psychiatric:         Mood and Affect: Mood normal.         Behavior: Behavior normal.                   "

## 2024-10-08 NOTE — LETTER
October 8, 2024     Patient: Uzair Jean   YOB: 1995   Date of Visit: 10/8/2024       To Whom it May Concern:    Uzair Jean was seen in my clinic on 10/8/2024. She may return to work on 10/10/2024 .    If you have any questions or concerns, please don't hesitate to call.         Sincerely,          Zohra Kim PA-C        CC: No Recipients

## 2024-10-08 NOTE — PATIENT INSTRUCTIONS
Recommended oral antibiotics.  Will try Keflex as patient reports that Bactrim and Doxy have not helped her in the past.  Recommended warm compresses daily to the area.  Advised to follow-up with dermatology as scheduled.    Follow up with PCP in 3-5 days.  Proceed to  ER if symptoms worsen.    If tests are performed, our office will contact you with results only if changes need to made to the care plan discussed with you at the visit. You can review your full results on [facility].

## 2024-10-10 ENCOUNTER — OFFICE VISIT (OUTPATIENT)
Dept: FAMILY MEDICINE CLINIC | Facility: CLINIC | Age: 29
End: 2024-10-10
Payer: COMMERCIAL

## 2024-10-10 VITALS
SYSTOLIC BLOOD PRESSURE: 124 MMHG | OXYGEN SATURATION: 99 % | WEIGHT: 197.8 LBS | HEART RATE: 97 BPM | HEIGHT: 65 IN | TEMPERATURE: 97.9 F | RESPIRATION RATE: 16 BRPM | BODY MASS INDEX: 32.96 KG/M2 | DIASTOLIC BLOOD PRESSURE: 80 MMHG

## 2024-10-10 DIAGNOSIS — L73.2 HIDRADENITIS: Primary | ICD-10-CM

## 2024-10-10 PROCEDURE — 99213 OFFICE O/P EST LOW 20 MIN: CPT | Performed by: FAMILY MEDICINE

## 2024-10-10 RX ORDER — CLINDAMYCIN PHOSPHATE 11.9 MG/ML
SOLUTION TOPICAL 2 TIMES DAILY
Qty: 60 ML | Refills: 0 | Status: SHIPPED | OUTPATIENT
Start: 2024-10-10

## 2024-10-10 RX ORDER — LIDOCAINE 40 MG/G
CREAM TOPICAL AS NEEDED
Qty: 120 G | Refills: 0 | Status: SHIPPED | OUTPATIENT
Start: 2024-10-10

## 2024-10-10 NOTE — PROGRESS NOTES
Ambulatory Visit  Name: Uzair Jean      : 1995      MRN: [id number]  Encounter Provider: Hai Moeller MD  Encounter Date: 10/10/2024   Encounter department: [facility]    Assessment & Plan  Hidradenitis    Currently experiencing HS flare. Seen at the urgent care on Tuesday with pain and swelling of the perineal area. Prescribed keflex 500  mg X 7 days. Also saw dermatology and prescribed Humira which will be available next week. Previously on cosentyx. Order clindamycin and topical lidocaine for pain relief.     Orders:    clindamycin (CLEOCIN T) 1 % external solution; Apply topically 2 (two) times a day    lidocaine (LMX) 4 % cream; Apply topically as needed for mild pain or moderate pain       History of Present Illness     Seen today with HS flare. Seen at the a  on Tuesday with pain and swelling in the perineal area. She was prescribed keflex 500  mg X 7 days. She saw dermatology and was prescribed Humira which will be available next week. She had had several flares in the past along with surgeries for this. Using tea tree oil and applying warm compress.     History obtained from : patient  Review of Systems   Constitutional:  Negative for fever.   Gastrointestinal:  Positive for rectal pain. Negative for diarrhea, nausea and vomiting.   Genitourinary:  Positive for vaginal pain.   Skin:  Positive for wound.     Medical History Reviewed by provider this encounter:       Past Medical History   Past Medical History:   Diagnosis Date    Abnormal Pap smear of cervix     Hidradenitis     gali buttocks and axilla - open areas - draining intermittently    History of COVID-19     21- no hospitalization    HPV in female     Wears glasses      Past Surgical History:   Procedure Laterality Date    AXILLARY HIDRADENITIS EXCISION Left 3/15/2022    Procedure: EXCISION HIDRADENITIS AXILLARY;  Surgeon: Rhonda Shen MD;  Location: [facility];  Service: General    AXILLARY  HIDRADENITIS EXCISION Right 9/2/2022    Procedure: EXCISION HIDRADENITIS AXILLARY;  Surgeon: Rhonda Shen MD;  Location: [facility];  Service: General    SKIN BIOPSY      WOUND DEBRIDEMENT Bilateral 2/11/2022    Procedure: EXCISIONAL DEBRIDEMENT of bilateral buttocks;  Surgeon: Rhonda Shen MD;  Location: [facility];  Service: General     Family History   Problem Relation Age of Onset    Endocrine tumor Mother         pituitary    Alcohol abuse Father     ADD / ADHD Father     Asthma Brother     Diabetes Maternal Grandmother     Hypertension Maternal Grandmother     Diabetes Paternal Grandmother      Current Outpatient Medications on File Prior to Visit   Medication Sig Dispense Refill    cephalexin (KEFLEX) 500 mg capsule Take 1 capsule (500 mg total) by mouth every 8 (eight) hours for 7 days 21 capsule 0    Adalimumab (Humira, 2 Pen,) 80 MG/0.8ML PNKT Inject 80mg on week 6 then every other week there after for maintenance dose. (Patient not taking: Reported on 10/10/2024) 2 each 10    Adalimumab (Humira, 2 Pen,) 80 MG/0.8ML PNKT Inject 160mg on week 0 for loading dose then 80mg on week 2 and week 4 for maintenance dose. (Patient not taking: Reported on 10/10/2024) 4 each 0     No current facility-administered medications on file prior to visit.     Allergies   Allergen Reactions    Oxycodone-Acetaminophen Drowsiness    Percolone [Oxycodone] GI Intolerance      Current Outpatient Medications on File Prior to Visit   Medication Sig Dispense Refill    cephalexin (KEFLEX) 500 mg capsule Take 1 capsule (500 mg total) by mouth every 8 (eight) hours for 7 days 21 capsule 0    Adalimumab (Humira, 2 Pen,) 80 MG/0.8ML PNKT Inject 80mg on week 6 then every other week there after for maintenance dose. (Patient not taking: Reported on 10/10/2024) 2 each 10    Adalimumab (Humira, 2 Pen,) 80 MG/0.8ML PNKT Inject 160mg on week 0 for loading dose then 80mg on week 2 and week 4 for maintenance dose. (Patient not taking:  "Reported on 10/10/2024) 4 each 0     No current facility-administered medications on file prior to visit.      Social History     Tobacco Use    Smoking status: Some Days     Types: Cigars    Smokeless tobacco: Never   Vaping Use    Vaping status: Never Used   Substance and Sexual Activity    Alcohol use: Yes     Comment: socially    Drug use: Yes     Frequency: 7.0 times per week     Types: Marijuana    Sexual activity: Yes     Partners: Female         Objective     /80 (BP Location: Left arm, Patient Position: Sitting, Cuff Size: Large)   Pulse 97   Temp 97.9 °F (36.6 °C) (Tympanic)   Resp 16   Ht 5' 5\" (1.651 m)   Wt 89.7 kg (197 lb 12.8 oz)   SpO2 99%   BMI 32.92 kg/m²     Physical Exam  Constitutional:       General: She is in acute distress (due to pain).      Appearance: She is not ill-appearing, toxic-appearing or diaphoretic.   Eyes:      Extraocular Movements: Extraocular movements intact.   Pulmonary:      Effort: Pulmonary effort is normal.   Skin:     General: Skin is warm.      Findings: Erythema, rash and wound present.          Neurological:      Mental Status: She is oriented to person, place, and time.   Psychiatric:         Mood and Affect: Mood normal.         Behavior: Behavior normal.         Thought Content: Thought content normal.       "

## 2024-10-10 NOTE — ASSESSMENT & PLAN NOTE
Currently experiencing HS flare. Seen at the urgent care on Tuesday with pain and swelling of the perineal area. Prescribed keflex 500  mg X 7 days. Also saw dermatology and prescribed Humira which will be available next week. Previously on cosentyx. Order clindamycin and topical lidocaine for pain relief.     Orders:    clindamycin (CLEOCIN T) 1 % external solution; Apply topically 2 (two) times a day    lidocaine (LMX) 4 % cream; Apply topically as needed for mild pain or moderate pain

## 2024-10-10 NOTE — PROGRESS NOTES
"Ambulatory Visit  Name: Uzair Jean      : 1995      MRN: [id number]  Encounter Provider: Hai Moeller MD  Encounter Date: 10/10/2024   Encounter department: [facility]    Assessment & Plan  Hidradenitis    Orders:    clindamycin (CLEOCIN T) 1 % external solution; Apply topically 2 (two) times a day    lidocaine (LMX) 4 % cream; Apply topically as needed for mild pain or moderate pain       History of Present Illness   {?Quick Links Encounters * My Last Note * Last Note in Specialty * Snapshot * Since Last Visit * History :87405}  HPI    {History obtained from (Optional):52675}  Review of Systems  {Select to Display PMH (Optional):59866}      Objective   {?Quick Links Trend Vitals * Enter New Vitals * Results Review * Timeline (Adult) * Labs * Imaging * Cardiology * Procedures * Lung Cancer Screening * Surgical eConsent :77703}  /80 (BP Location: Left arm, Patient Position: Sitting, Cuff Size: Large)   Pulse 97   Temp 97.9 °F (36.6 °C) (Tympanic)   Resp 16   Ht 5' 5\" (1.651 m)   Wt 89.7 kg (197 lb 12.8 oz)   SpO2 99%   BMI 32.92 kg/m²     Physical Exam  {Administrative / Billing Section (Optional):74861}  "

## 2024-10-10 NOTE — ASSESSMENT & PLAN NOTE
Orders:    clindamycin (CLEOCIN T) 1 % external solution; Apply topically 2 (two) times a day    lidocaine (LMX) 4 % cream; Apply topically as needed for mild pain or moderate pain

## 2024-10-14 ENCOUNTER — TELEPHONE (OUTPATIENT)
Age: 29
End: 2024-10-14

## 2024-10-14 NOTE — TELEPHONE ENCOUNTER
Recieved call from patient to schedule for first Humira shot. Scheduled on 10/16/24 at 3:30 pm with Derm Nurse in [place]. Confirmed insurance, provided [place] location address.     Informed patient they need to take medication out of refrigerator 45 mins prior to appt, and that they will have to wait 30 mins after to monitor for any potential reaction.     Patient verbalized understanding.

## 2024-10-16 ENCOUNTER — CLINICAL SUPPORT (OUTPATIENT)
Dept: DERMATOLOGY | Facility: CLINIC | Age: 29
End: 2024-10-16
Payer: COMMERCIAL

## 2024-10-16 VITALS
SYSTOLIC BLOOD PRESSURE: 124 MMHG | DIASTOLIC BLOOD PRESSURE: 81 MMHG | OXYGEN SATURATION: 98 % | TEMPERATURE: 97.8 F | HEART RATE: 79 BPM

## 2024-10-16 DIAGNOSIS — Z76.89 ENCOUNTER FOR MEDICATION ADMINISTRATION: ICD-10-CM

## 2024-10-16 DIAGNOSIS — L73.2 HIDRADENITIS SUPPURATIVA: Primary | ICD-10-CM

## 2024-10-16 PROCEDURE — 96372 THER/PROPH/DIAG INJ SC/IM: CPT | Performed by: STUDENT IN AN ORGANIZED HEALTH CARE EDUCATION/TRAINING PROGRAM

## 2024-10-16 NOTE — LETTER
October 17, 2024     Patient: Uzair Jean  YOB: 1995  Date of Visit: 10/16/2024      To Whom it May Concern:    Uzair Jean is under my professional care. Uzair was seen in my office on 10/16/2024.    If you have any questions or concerns, please don't hesitate to call.          Sincerely,          Kristi Bautista RN        CC: No Recipients

## 2024-10-16 NOTE — PROGRESS NOTES
HUMIRA Biologic Injectable Administration     Additional History of Present Condition:  Patient is present for education and administration of Humira. Medication administration order placed. Provider order matches prescription order.     Assessment and Plan:  Based on a thorough discussion of this condition and the management approach to it (including a comprehensive discussion of the known risks, side effects and potential benefits of treatment), the patient (family) agrees to implement the following specific plan:  First Humira injection administered today in the right and left thigh   Verbal consent obtained to administer.   Next dose due 10/30/2024  Patient provided education and training to continue to administer this at home.   Side effects discussed and how to report  Schedule 6 month follow up with ordering provider. Scheduled 12/6/2024      Biologic Injectable Administration Note  Diagnosis: HS  This is injection number 1    Informed consent: Discussed risks (Risks of hypersensitivity reaction, injection site reaction, conjunctivitis/keratitis, HSV reactivation, increased susceptibility to parasitic infections, inefficacy were reviewed.) Verbal consent obtained.   Preparation: After discussion potential procedure related risks including pain, bleeding, new infection, reactivation of latent infection, inefficacy, increased risk of malignancy, hypersensitivity reaction, injection site reaction, verbal consent was obtained. The areas were cleansed with alcohol prep pads and allowed to fully air dry for 3 minutes.  Procedure Details:  160 mg was injected subcutaneously in the right and left thigh   Lot Number: [id number]  Expiration: 09/01/2025  Total Injected: 160 mg   NDC: 2989-7789-04     Patient tolerated procedure well, with minimal pinpoint bleeding that was controlled with pressure. Aftercare was reviewed.         IMPORTANT SAFETY INFORMATION   ABOUT HUMIRA® (adalimumab)1  What is the most important  information I should know about HUMIRA?  You should discuss the potential benefits and risks of HUMIRA with your doctor. HUMIRA is a TNF blocker medicine that can lower the ability of your immune system to fight infections. You should not start taking HUMIRA if you have any kind of infection unless your doctor says it is okay.  Serious infections have happened in people taking HUMIRA. These serious infections include tuberculosis (TB) and infections caused by viruses, fungi, or bacteria that have spread throughout the body. Some people have  from these infections. Your doctor should test you for TB before starting HUMIRA, and check you closely for signs and symptoms of TB during treatment with HUMIRA, even if your TB test was negative. If your doctor feels you are at risk, you may be treated with medicine for TB.  Cancer. For children and adults taking TNF blockers, including HUMIRA, the chance of getting lymphoma or other cancers may increase. There have been cases of unusual cancers in children, teenagers, and young adults using TNF blockers. Some people have developed a rare type of cancer called hepatosplenic T-cell lymphoma. This type of cancer often results in death. If using TNF blockers including HUMIRA, your chance of getting two types of skin cancer (basal cell and squamous cell) may increase. These types are generally not life-threatening if treated; tell your doctor if you have a bump or open sore that doesn’t heal.  What should I tell my doctor BEFORE starting HUMIRA?  Tell your doctor about all of your health conditions, including if you:  Have an infection, are being treated for infection, or have symptoms of an infection  Get a lot of infections or infections that keep coming back  Have diabetes  Have TB or have been in close contact with someone with TB, or were born in, lived in, or traveled where there is more risk for getting TB  Live or have lived in an area (such as the [place] and [place]  [facility]) where there is an increased risk for getting certain kinds of fungal infections, such as histoplasmosis, coccidioidomycosis, or blastomycosis. These infections may happen or become more severe if you use HUMIRA. Ask your doctor if you are unsure if you have lived in these areas  Have or have had hepatitis B  Are scheduled for major surgery  Have or have had cancer  Have numbness or tingling or a nervous system disease such as multiple sclerosis or Guillain-Barré syndrome  Have or had heart failure  Have recently received or are scheduled to receive a vaccine. HUMIRA patients may receive vaccines, except for live vaccines. Children should be brought up to date on all vaccines before starting HUMIRA  Are allergic to rubber, latex, or any HUMIRA ingredients  Are pregnant, planning to become pregnant, breastfeeding, or planning to breastfeed  Have a baby and you were using HUMIRA during your pregnancy. Tell your baby’s doctor before your baby receives any vaccines  Also tell your doctor about all the medicines you take. You should not take HUMIRA with ORENCIA® (abatacept), KINERET® (anakinra), REMICADE® (infliximab), ENBREL® (etanercept), CIMZIA® (certolizumab pegol), or SIMPONI® (golimumab). Tell your doctor if you have ever used RITUXAN® (rituximab), IMURAN® (azathioprine), or PURINETHOL® (mercaptopurine, 6-MP).  What should I watch for AFTER starting HUMIRA?  HUMIRA can cause serious side effects, including:  Serious infections. These include TB and infections caused by viruses, fungi, or bacteria. Symptoms related to TB include a cough, low-grade fever, weight loss, or loss of body fat and muscle.  Hepatitis B infection in carriers of the virus. Symptoms include muscle aches, feeling very tired, dark urine, skin or eyes that look yellow, little or no appetite, vomiting, asael-colored bowel movements, fever, chills, stomach discomfort, and skin rash.  Allergic reactions. Symptoms of a serious allergic  reaction include hives, trouble breathing, and swelling of your face, eyes, lips, or mouth.  Nervous system problems. Signs and symptoms include numbness or tingling, problems with your vision, weakness in your arms or legs, and dizziness.  Blood problems (decreased blood cells that help fight infections or stop bleeding). Symptoms include a fever that does not go away, bruising or bleeding very easily, or looking very pale.  Heart failure (new or worsening). Symptoms include shortness of breath, swelling of your ankles or feet, and sudden weight gain.  Immune reactions including a lupus-like syndrome. Symptoms include chest discomfort or pain that does not go away, shortness of breath, joint pain, or rash on your cheeks or arms that gets worse in the sun.  Liver problems. Symptoms include feeling very tired, skin or eyes that look yellow, poor appetite or vomiting, and pain on the right side of your stomach (abdomen). These problems can lead to liver failure and death.  Psoriasis (new or worsening). Symptoms include red scaly patches or raised bumps that are filled with pus.  Call your doctor or get medical care right away if you develop any of the above symptoms.  Common side effects of HUMIRA include injection site reactions (pain, redness, rash, swelling, itching, or bruising), upper respiratory infections (sinus infections), headaches, rash, and nausea. These are not all of the possible side effects with HUMIRA. Tell your doctor if you have any side effect that bothers you or that does not go away.  Remember, tell your doctor right away if you have an infection or symptoms of an infection, including:  Fever, sweats, or chills  Muscle aches  Cough  Shortness of breath  Blood in phlegm  Weight loss  Warm, red, or painful skin or sores on your body  Diarrhea or stomach pain  Burning when you urinate  Urinating more often than normal  Feeling very tired  HUMIRA is given by injection under the skin.  This is the  most important information to know about HUMIRA. For more information, talk to your health care provider.  Uses  HUMIRA is a prescription medicine used:  To reduce the signs and symptoms of:  Moderate to severe rheumatoid arthritis (RA) in adults. HUMIRA can be used alone, with methotrexate, or with certain other medicines. HUMIRA may prevent further damage to your bones and joints and may help your ability to perform daily activities.  Moderate to severe polyarticular juvenile idiopathic arthritis (TRISTIN) in children 2 years of age and older. HUMIRA can be used alone or with methotrexate.  Psoriatic arthritis (PsA) in adults. HUMIRA can be used alone or with certain other medicines. HUMIRA may prevent further damage to your bones and joints and may help your ability to perform daily activities.  Ankylosing spondylitis (AS) in adults.  Moderate to severe hidradenitis suppurativa (HS) in people 12 years and older.  To treat moderate to severe Crohn’s disease (CD) in adults and children 6 years of age and older.  To treat moderate to severe ulcerative colitis (UC) in adults and children 5 years of age and older. It is not known if HUMIRA is effective in people who stopped responding to or could not tolerate anti-TNF medicines.  To treat moderate to severe chronic plaque psoriasis (Ps) in adults who are ready for systemic therapy or phototherapy, and are under the care of a doctor who will decide if other systemic therapies are less appropriate.  To treat non-infectious intermediate (middle part of the eye), posterior (back of the eye), and panuveitis (all parts of the eye) in adults and children 2 years of age and older.  [id number]

## 2024-10-17 ENCOUNTER — TELEPHONE (OUTPATIENT)
Age: 29
End: 2024-10-17

## 2024-10-17 NOTE — TELEPHONE ENCOUNTER
Rec'd call from patient stating that she was in yesterday for her Humira shot and she was told to call today to request an excuse.    Patient states that she'll pull letter from her MyChart.    Please GENERATE EXCUSE for yesterdays visit for patient.    Any questions/concerns, please contact patient directly.

## 2024-10-18 ENCOUNTER — OFFICE VISIT (OUTPATIENT)
Dept: URGENT CARE | Facility: CLINIC | Age: 29
End: 2024-10-18
Payer: COMMERCIAL

## 2024-10-18 VITALS
SYSTOLIC BLOOD PRESSURE: 116 MMHG | OXYGEN SATURATION: 99 % | BODY MASS INDEX: 33.49 KG/M2 | HEART RATE: 62 BPM | RESPIRATION RATE: 16 BRPM | TEMPERATURE: 98.3 F | HEIGHT: 65 IN | DIASTOLIC BLOOD PRESSURE: 72 MMHG | WEIGHT: 201 LBS

## 2024-10-18 DIAGNOSIS — L73.2 HIDRADENITIS: Primary | ICD-10-CM

## 2024-10-18 PROCEDURE — S9083 URGENT CARE CENTER GLOBAL: HCPCS | Performed by: NURSE PRACTITIONER

## 2024-10-18 PROCEDURE — 99213 OFFICE O/P EST LOW 20 MIN: CPT | Performed by: NURSE PRACTITIONER

## 2024-10-18 NOTE — PROGRESS NOTES
[facility]        NAME: Uzair Jean is a 29 y.o. female  : 1995    MRN: [id number]  DATE: 2024  TIME: 11:03 AM    Assessment and Plan   Hidradenitis [L73.2]  1. Hidradenitis          Advised to restart the topical clindamycin bid for the next three days. Continue warm compresses. Continue follow up with derm as directed for Humira treatment.     Patient Instructions       Follow up with PCP in 3-5 days.  Proceed to  ER if symptoms worsen.    If tests are performed, our office will contact you with results only if changes need to made to the care plan discussed with you at the visit. You can review your full results on [facility].    Chief Complaint     Chief Complaint   Patient presents with    Groin Pain     Pt c/o right sided groin pain since this past Tuesday. Pt states having a HS flare-up and it popped this morning.           History of Present Illness       Patient with chronic hidradenitis and current flare up. States had a flare up that just popped near her buttocks. She did get scheduled with derm and had her first Humira injection about two days ago. Was recently on po keflex, topical clindamycin and topical lidocaine for pain control. Using heating pad for comfort as well. Needs work excuse.         Review of Systems   Review of Systems   Constitutional:  Negative for chills, fatigue and fever.   Genitourinary:  Negative for dysuria and pelvic pain.   Skin:  Positive for color change and wound.   Psychiatric/Behavioral:  Negative for sleep disturbance.          Current Medications       Current Outpatient Medications:     Adalimumab (Humira, 2 Pen,) 80 MG/0.8ML PNKT, Inject 80mg on week 6 then every other week there after for maintenance dose., Disp: 2 each, Rfl: 10    Adalimumab (Humira, 2 Pen,) 80 MG/0.8ML PNKT, Inject 160mg on week 0 for loading dose then 80mg on week 2 and week 4 for maintenance dose. (Patient not taking: Reported on 10/10/2024), Disp: 4  each, Rfl: 0    clindamycin (CLEOCIN T) 1 % external solution, Apply topically 2 (two) times a day (Patient not taking: Reported on 10/18/2024), Disp: 60 mL, Rfl: 0    lidocaine (LMX) 4 % cream, Apply topically as needed for mild pain or moderate pain (Patient not taking: Reported on 10/18/2024), Disp: 120 g, Rfl: 0    Current Allergies     Allergies as of 10/18/2024 - Reviewed 10/18/2024   Allergen Reaction Noted    Oxycodone-acetaminophen Drowsiness 07/03/2018    Percolone [oxycodone] GI Intolerance 06/01/2017            The following portions of the patient's history were reviewed and updated as appropriate: allergies, current medications, past family history, past medical history, past social history, past surgical history and problem list.     Past Medical History:   Diagnosis Date    Abnormal Pap smear of cervix     Hidradenitis     gali buttocks and axilla - open areas - draining intermittently    History of COVID-19     12/21/21- no hospitalization    HPV in female     Wears glasses        Past Surgical History:   Procedure Laterality Date    AXILLARY HIDRADENITIS EXCISION Left 3/15/2022    Procedure: EXCISION HIDRADENITIS AXILLARY;  Surgeon: Rhonda Shen MD;  Location: [facility];  Service: General    AXILLARY HIDRADENITIS EXCISION Right 9/2/2022    Procedure: EXCISION HIDRADENITIS AXILLARY;  Surgeon: Rhonda Shen MD;  Location: [facility];  Service: General    SKIN BIOPSY      WOUND DEBRIDEMENT Bilateral 2/11/2022    Procedure: EXCISIONAL DEBRIDEMENT of bilateral buttocks;  Surgeon: Rhonda Shen MD;  Location: [facility];  Service: General       Family History   Problem Relation Age of Onset    Endocrine tumor Mother         pituitary    Alcohol abuse Father     ADD / ADHD Father     Asthma Brother     Diabetes Maternal Grandmother     Hypertension Maternal Grandmother     Diabetes Paternal Grandmother          Medications have been verified.        Objective   /72   Pulse 62   Temp  "98.3 °F (36.8 °C)   Resp 16   Ht 5' 5\" (1.651 m)   Wt 91.2 kg (201 lb)   SpO2 99%   BMI 33.45 kg/m²        Physical Exam     Physical Exam  Vitals and nursing note reviewed.   Constitutional:       General: She is not in acute distress.     Appearance: Normal appearance. She is not ill-appearing.   HENT:      Head: Normocephalic and atraumatic.   Cardiovascular:      Rate and Rhythm: Normal rate and regular rhythm.      Heart sounds: Normal heart sounds, S1 normal and S2 normal.   Pulmonary:      Effort: Pulmonary effort is normal. No accessory muscle usage.      Breath sounds: Normal breath sounds. No wheezing.   Genitourinary:         Comments: Multiple hidradenitis lesions, mild TTP  Skin:     General: Skin is warm and dry.      Capillary Refill: Capillary refill takes less than 2 seconds.      Findings: No rash.   Neurological:      General: No focal deficit present.      Mental Status: She is alert and oriented to person, place, and time.      Motor: Motor function is intact.   Psychiatric:         Attention and Perception: Attention and perception normal.         Mood and Affect: Mood and affect normal.         Speech: Speech normal.         Behavior: Behavior normal. Behavior is cooperative.         Thought Content: Thought content normal.                   "

## 2024-10-18 NOTE — LETTER
October 18, 2024     Patient: Uzair Jean   YOB: 1995   Date of Visit: 10/18/2024       To Whom it May Concern:    Uzair Jean was seen in my clinic on 10/18/2024. She may return to work on 10/21/2024 . Please excuse for recent absences.     If you have any questions or concerns, please don't hesitate to call.         Sincerely,          DANIELLE Fried        CC: No Recipients

## 2024-11-12 ENCOUNTER — OFFICE VISIT (OUTPATIENT)
Dept: OBGYN CLINIC | Facility: CLINIC | Age: 29
End: 2024-11-12
Payer: COMMERCIAL

## 2024-11-12 VITALS
DIASTOLIC BLOOD PRESSURE: 70 MMHG | SYSTOLIC BLOOD PRESSURE: 118 MMHG | HEIGHT: 65 IN | WEIGHT: 199 LBS | BODY MASS INDEX: 33.15 KG/M2

## 2024-11-12 DIAGNOSIS — N76.1 CHRONIC VAGINITIS: Primary | ICD-10-CM

## 2024-11-12 DIAGNOSIS — Z11.3 SCREENING FOR STD (SEXUALLY TRANSMITTED DISEASE): ICD-10-CM

## 2024-11-12 PROBLEM — Z00.00 ENCOUNTER FOR ANNUAL PHYSICAL EXAM: Status: RESOLVED | Noted: 2023-01-01 | Resolved: 2024-11-12

## 2024-11-12 PROCEDURE — 99212 OFFICE O/P EST SF 10 MIN: CPT | Performed by: PHYSICIAN ASSISTANT

## 2024-11-12 NOTE — PROGRESS NOTES
Assessment/Plan:   - Swab collected  - Will reach out to patient with results.    Diagnoses and all orders for this visit:    Chronic vaginitis  -     SURESWAB(R) ADVANCED VAGINITIS PLUS, TMA    Screening for STD (sexually transmitted disease)  -     SURESWAB(R) ADVANCED VAGINITIS PLUS, TMA          Subjective:     Patient ID: Uzair Jean is a 29 y.o. female.    Uzair is a 28YO G0 AFAM female presenting to the office with complaints of a history of bacterial vaginitis. She states she was treated a few months ago for BV and she would like to be tested for vaginitis and STIs today. She denies vaginal discharge or itching today. She reports having an odor in the past but no longer has it.         Review of Systems   Genitourinary:  Positive for genital sores (known HS). Negative for vaginal bleeding, vaginal discharge and vaginal pain.         Objective:     Physical Exam  Vitals reviewed.   Constitutional:       Appearance: Normal appearance.   HENT:      Head: Normocephalic and atraumatic.   Pulmonary:      Effort: Pulmonary effort is normal.   Genitourinary:     Exam position: Lithotomy position.      Pubic Area: No rash.       Labia:         Right: No rash or lesion.         Left: No rash or lesion.       Vagina: Normal. No vaginal discharge, tenderness or lesions.      Cervix: No discharge or lesion.      Uterus: Normal. Not tender.       Adnexa: Right adnexa normal and left adnexa normal.        Right: No mass or tenderness.          Left: No mass or tenderness.        Comments: Multiple genital and inguinal lesions consistent with HS.   Skin:     General: Skin is warm and dry.      Findings: No lesion or rash.   Neurological:      General: No focal deficit present.      Mental Status: She is alert.

## 2024-11-14 ENCOUNTER — RESULTS FOLLOW-UP (OUTPATIENT)
Dept: OBGYN CLINIC | Facility: CLINIC | Age: 29
End: 2024-11-14

## 2024-11-14 DIAGNOSIS — N76.0 BACTERIAL VAGINOSIS: Primary | ICD-10-CM

## 2024-11-14 DIAGNOSIS — B96.89 BACTERIAL VAGINOSIS: Primary | ICD-10-CM

## 2024-11-14 LAB
BV BACTERIA RRNA VAG QL NAA+PROBE: POSITIVE
C GLABRATA RNA VAG QL NAA+PROBE: NOT DETECTED
C TRACH RRNA SPEC QL NAA+PROBE: NOT DETECTED
CANDIDA RRNA VAG QL PROBE: NOT DETECTED
N GONORRHOEA RRNA SPEC QL NAA+PROBE: NOT DETECTED
T VAGINALIS RRNA SPEC QL NAA+PROBE: NOT DETECTED

## 2024-11-14 RX ORDER — METRONIDAZOLE 7.5 MG/G
1 GEL VAGINAL
Qty: 5 G | Refills: 0 | Status: SHIPPED | OUTPATIENT
Start: 2024-11-14 | End: 2024-11-19

## 2024-11-15 ENCOUNTER — OFFICE VISIT (OUTPATIENT)
Dept: FAMILY MEDICINE CLINIC | Facility: CLINIC | Age: 29
End: 2024-11-15

## 2024-11-15 VITALS
SYSTOLIC BLOOD PRESSURE: 126 MMHG | DIASTOLIC BLOOD PRESSURE: 66 MMHG | HEIGHT: 65 IN | BODY MASS INDEX: 33.49 KG/M2 | HEART RATE: 89 BPM | WEIGHT: 201 LBS | RESPIRATION RATE: 16 BRPM | OXYGEN SATURATION: 98 % | TEMPERATURE: 98.3 F

## 2024-11-15 DIAGNOSIS — L73.2 HIDRADENITIS: ICD-10-CM

## 2024-11-15 DIAGNOSIS — Z23 ENCOUNTER FOR IMMUNIZATION: ICD-10-CM

## 2024-11-15 DIAGNOSIS — Z00.00 ENCOUNTER FOR ANNUAL PHYSICAL EXAM: Primary | ICD-10-CM

## 2024-11-15 RX ORDER — ADALIMUMAB 80MG/0.8ML
KIT SUBCUTANEOUS
COMMUNITY
Start: 2024-11-07

## 2024-11-15 NOTE — PROGRESS NOTES
Adult Annual Physical  Name: Uzair Jean      : 1995      MRN: [id number]  Encounter Provider: Hai Moeller MD  Encounter Date: 11/15/2024   Encounter department: [facility]    Assessment & Plan  Encounter for annual physical exam  No acute concerns. Received tetanus vaccine in Tuesday after box fell on her arm causing a superficial laceration. Flu administered today        Encounter for immunization    Orders:    influenza vaccine, recombinant, PF, 0.5 mL IM (Flublok)    Hidradenitis  Currently on Humira        Immunizations and preventive care screenings were discussed with patient today. Appropriate education was printed on patient's after visit summary.    Counseling:  Dental Health: discussed importance of regular tooth brushing, flossing, and dental visits.  Injury prevention: discussed safety/seat belts, safety helmets, smoke detectors, carbon monoxide detectors, and smoking near bedding or upholstery.  Exercise: the importance of regular exercise/physical activity was discussed. Recommend exercise 3-5 times per week for at least 30 minutes.          History of Present Illness     Adult Annual Physical:  Patient presents for annual physical. Feels blah  Attributes it to the weather  Started humira received 2 doses total and will get the next one in a week ]  Would like flu shot   Received her tetanus on Tuesday after he box fell on her right arm while at work   .     Diet and Physical Activity:  - Diet/Nutrition: well balanced diet. 1 meal a day and around her periods is when she has her period  - Exercise: 5-7 times a week on average.    General Health:  - Sleep:. doesnt sleep due to her work scheedule but will catch up on her sleep on weekends  - Hearing: normal hearing bilateral ears.  - Vision: no vision problems.  - Dental: regular dental visits.    /GYN Health:  - Follows with GYN: yes.   - Menopause: premenopausal.   - Last menstrual cycle: 2024.   -  History of STDs: no  - Contraception:. none      Review of Systems  Medical History Reviewed by provider this encounter:  Allergies  Meds  Problems  Med Hx  Surg Hx  Fam Hx     .  Past Medical History   Past Medical History:   Diagnosis Date    Abnormal Pap smear of cervix     Hidradenitis     gali buttocks and axilla - open areas - draining intermittently    History of COVID-19     12/21/21- no hospitalization    HPV in female     Wears glasses      Past Surgical History:   Procedure Laterality Date    AXILLARY HIDRADENITIS EXCISION Left 3/15/2022    Procedure: EXCISION HIDRADENITIS AXILLARY;  Surgeon: Rhonda Shen MD;  [facility];  Service: General    AXILLARY HIDRADENITIS EXCISION Right 9/2/2022    Procedure: EXCISION HIDRADENITIS AXILLARY;  Surgeon: Rhonda Shen MD;  [facility];  Service: General    SKIN BIOPSY      WOUND DEBRIDEMENT Bilateral 2/11/2022    Procedure: EXCISIONAL DEBRIDEMENT of bilateral buttocks;  Surgeon: Rhonda Shen MD;  [facility];  Service: General     Family History   Problem Relation Age of Onset    Endocrine tumor Mother         pituitary    Alcohol abuse Father     ADD / ADHD Father     Asthma Brother     Diabetes Maternal Grandmother     Hypertension Maternal Grandmother     Diabetes Paternal Grandmother       reports that she has been smoking cigars. She has never used smokeless tobacco. She reports current alcohol use. She reports current drug use. Frequency: 7.00 times per week. Drug: Marijuana.  Current Outpatient Medications on File Prior to Visit   Medication Sig Dispense Refill    Adalimumab (Humira, 2 Pen,) 80 MG/0.8ML PNKT Inject 80mg on week 6 then every other week there after for maintenance dose. 2 each 10    Humira, 2 Pen, 80 MG/0.8ML AJKT       metroNIDAZOLE (METROGEL) 0.75 % vaginal gel Insert 1 Application into the vagina daily at bedtime for 5 days (Patient not taking: Reported on 11/15/2024) 5 g 0     No current  "facility-administered medications on file prior to visit.     Allergies   Allergen Reactions    Oxycodone-Acetaminophen Drowsiness    Percolone [Oxycodone] GI Intolerance      Current Outpatient Medications on File Prior to Visit   Medication Sig Dispense Refill    Adalimumab (Humira, 2 Pen,) 80 MG/0.8ML PNKT Inject 80mg on week 6 then every other week there after for maintenance dose. 2 each 10    Humira, 2 Pen, 80 MG/0.8ML AJKT       metroNIDAZOLE (METROGEL) 0.75 % vaginal gel Insert 1 Application into the vagina daily at bedtime for 5 days (Patient not taking: Reported on 11/15/2024) 5 g 0     No current facility-administered medications on file prior to visit.      Social History     Tobacco Use    Smoking status: Some Days     Types: Cigars    Smokeless tobacco: Never   Vaping Use    Vaping status: Never Used   Substance and Sexual Activity    Alcohol use: Yes     Comment: socially    Drug use: Yes     Frequency: 7.0 times per week     Types: Marijuana    Sexual activity: Yes     Partners: Female       Objective   /66 (BP Location: Left arm, Patient Position: Sitting, Cuff Size: Large)   Pulse 89   Temp 98.3 °F (36.8 °C) (Tympanic)   Resp 16   Ht 5' 5\" (1.651 m)   Wt 91.2 kg (201 lb)   LMP 11/02/2024   SpO2 98%   BMI 33.45 kg/m²     Physical Exam  Constitutional:       General: She is not in acute distress.     Appearance: Normal appearance. She is not ill-appearing or toxic-appearing.   HENT:      Head: Normocephalic and atraumatic.   Eyes:      Extraocular Movements: Extraocular movements intact.   Cardiovascular:      Rate and Rhythm: Normal rate and regular rhythm.      Heart sounds: No murmur heard.  Pulmonary:      Effort: Pulmonary effort is normal.   Abdominal:      General: There is no distension.      Palpations: Abdomen is soft. There is no mass.      Tenderness: There is no abdominal tenderness. There is no guarding or rebound.      Hernia: No hernia is present.   Musculoskeletal:      " Right lower leg: No edema.      Left lower leg: No edema.   Skin:     General: Skin is warm.   Neurological:      Mental Status: She is alert and oriented to person, place, and time.   Psychiatric:         Mood and Affect: Mood normal.         Behavior: Behavior normal.

## 2024-12-06 ENCOUNTER — OFFICE VISIT (OUTPATIENT)
Dept: DERMATOLOGY | Facility: CLINIC | Age: 29
End: 2024-12-06
Payer: COMMERCIAL

## 2024-12-06 DIAGNOSIS — L73.2 HIDRADENITIS SUPPURATIVA: Primary | ICD-10-CM

## 2024-12-06 PROCEDURE — 11900 INJECT SKIN LESIONS </W 7: CPT | Performed by: DERMATOLOGY

## 2024-12-06 RX ORDER — TRIAMCINOLONE ACETONIDE 40 MG/ML
40 INJECTION, SUSPENSION INTRA-ARTICULAR; INTRAMUSCULAR ONCE
Status: COMPLETED | OUTPATIENT
Start: 2024-12-06 | End: 2024-12-06

## 2024-12-06 RX ADMIN — TRIAMCINOLONE ACETONIDE 40 MG: 40 INJECTION, SUSPENSION INTRA-ARTICULAR; INTRAMUSCULAR at 17:03

## 2024-12-06 NOTE — PATIENT INSTRUCTIONS
HIDRADENITIS SUPPURATIVA FOLLOW UP        Assessment and Plan:  Based on a thorough discussion of this condition and the management approach to it (including a comprehensive discussion of the known risks, side effects and potential benefits of treatment), the patient (family) agrees to implement the following specific plan:     Continue with Humira  Kenalog injection administered today   Consent form signed by patient in office  Follow up in 6 months  Continue to monitor areas for any active flares      What is hidradenitis suppurativa?  Hidradenitis suppurativa is an inflammatory skin disease that affects apocrine gland-bearing skin in the axillae, in the groin, and under the breasts. It is characterised by recurrent boil-like nodules and abscesses that culminate in pus-like discharge, difficult-to-heal open wounds (sinuses) and scarring. Hidradenitis suppurativa also has significant psychological impact and many patients suffer from impairment of body image, depression and anxiety.      The term hidradenitis implies it starts as an inflammatory disorder of sweat glands, which is now known to be incorrect. Hidradenitis suppurativa is also known as acne inversa.     Who gets hidradenitis suppurativa?  Hidradenitis often starts at puberty, and is most active between the ages of 20 and 40 years, and in women, can resolve at menopause. It is three times more common in females than in males. Risk factors include:  Other family members with hidradenitis suppurativa  Obesity and insulin resistance/metabolic syndrome  Cigarette smoking  Follicular occlusion disorders: acne conglobata, dissecting cellulitis, pilonidal sinus  Inflammatory bowel disease (Crohn disease)  Rare autoinflammatory syndromes associated with abnormalities of PSTPIP1 gene.*     * PAPA syndrome (pyogenic arthritis, pyoderma gangrenosum and acne), PASH syndrome (pyoderma gangrenosum, acne, suppurative hidradenitis) and PAPASH syndrome (pyogenic  arthritis, pyoderma gangrenosum, acne, suppurative hidradenitis).     PROCEDURE:  INTRALESIONAL STEROID INJECTION (KENALOG INJECTION)    Purpose: Triamcinolone is a synthetic glucocorticoid corticosteroid that has marked anti-inflammatory action. It is prepared in sterile aqueous suspension suitable for injecting directly into a lesion on or immediately below the skin to treat a dermal inflammatory process.     Indications: It is indicated for alopecia areata; inflammatory acne cysts; discoid lupus erythematosus; keloids and hypertrophic scars; inflammatory lesions of granuloma annulare, lichen planus, lichen simplex chronicus (neurodermatitis), psoriatic plaques, and other localized inflammatory skin conditions.     Potential Side Effects: I understand that triamcinolone injection can potentially cause early and/or delayed adverse effects such as:    Pain    Impaired wound healing    Increased hair growth    Bleeding    White or brown marks    Steroid acne    Infection    Telangiectasia    Skin thinning    Cutaneous and subcutaneous lipoatrophy (most common) appearing as skin indentations or dimples around the injection sites a few weeks after treatment     PROCEDURE NOTE:  After verbal and written consent were obtained, the to-be-treated area was wiped and cleaned with rubbing alcohol 70%.        There was less than 1 mL of blood loss and little to no discomfort.  The area was bandaged with a Band-aid.  The patient tolerated the procedure well and remained in the office for observation.  With no signs of an adverse reaction, the patient was eventually discharged from clinic.

## 2024-12-06 NOTE — PROGRESS NOTES
"[facility] D[facility]atology Clinic Note     Patient Name: Uzair Jean  Encounter Date: 12/6/24     Have you been cared for by a [facility] Dermatologist in the last 3 years and, if so, which description applies to you?    Yes.  I have been here within the last 3 years, and my medical history has NOT changed since that time.  I am FEMALE/of child-bearing potential.    REVIEW OF SYSTEMS:  Have you recently had or currently have any of the following? No changes in my recent health.   PAST MEDICAL HISTORY:  Have you personally ever had or currently have any of the following?  If \"YES,\" then please provide more detail. No changes in my medical history.   HISTORY OF IMMUNOSUPPRESSION: Do you have a history of any of the following:  Systemic Immunosuppression such as Diabetes, Biologic or Immunotherapy, Chemotherapy, Organ Transplantation, Bone Marrow Transplantation or Prednisone?  No     Answering \"YES\" requires the addition of the dotphrase \"IMMUNOSUPPRESSED\" as the first diagnosis of the patient's visit.   FAMILY HISTORY:  Any \"first degree relatives\" (parent, brother, sister, or child) with the following?    No changes in my family's known health.   PATIENT EXPERIENCE:    Do you want the Dermatologist to perform a COMPLETE skin exam today including a clinical examination under the \"bra and underwear\" areas?  NO  If necessary, do we have your permission to call and leave a detailed message on your Preferred Phone number that includes your specific medical information?  Yes      Allergies   Allergen Reactions    Oxycodone-Acetaminophen Drowsiness    Percolone [Oxycodone] GI Intolerance      Current Outpatient Medications:     Adalimumab (Humira, 2 Pen,) 80 MG/0.8ML PNKT, Inject 80mg on week 6 then every other week there after for maintenance dose., Disp: 2 each, Rfl: 10    Humira, 2 Pen, 80 MG/0.8ML AJKT, , Disp: , Rfl:           Whom besides the patient is providing clinical information about today's encounter?   NO " ADDITIONAL HISTORIAN (patient alone provided history)    Physical Exam and Assessment/Plan by Diagnosis:    HIDRADENITIS SUPPURATIVA FOLLOW UP     Physical Exam:  Anatomic Location Affected:  buttocks, groin, bilateral axilla  Morphological Description:  multiple draining nodules with sinus tracts, crandall stage 3  Pertinent Positives:  Pertinent Negatives:     Additional History of Present Condition:  Patient is here for a follow up of HS. Patient was last evaluated by Dr. Wolfe on 9/12/24. Currently on Humira and taking every 2 weeks. Patient notes bilateral axilla have improved. Currently has a flare located around the groin/buttock area. Not using any other topical or oral medications for HS.         Assessment and Plan:  Based on a thorough discussion of this condition and the management approach to it (including a comprehensive discussion of the known risks, side effects and potential benefits of treatment), the patient (family) agrees to implement the following specific plan:     Continue with Humira  Kenalog 40mg injection administered today   Consent form signed by patient in office  Follow up in 6 months  Continue to monitor areas for any active flares      What is hidradenitis suppurativa?  Hidradenitis suppurativa is an inflammatory skin disease that affects apocrine gland-bearing skin in the axillae, in the groin, and under the breasts. It is characterised by recurrent boil-like nodules and abscesses that culminate in pus-like discharge, difficult-to-heal open wounds (sinuses) and scarring. Hidradenitis suppurativa also has significant psychological impact and many patients suffer from impairment of body image, depression and anxiety.      The term hidradenitis implies it starts as an inflammatory disorder of sweat glands, which is now known to be incorrect. Hidradenitis suppurativa is also known as acne inversa.     Who gets hidradenitis suppurativa?  Hidradenitis often starts at puberty, and is most  active between the ages of 20 and 40 years, and in women, can resolve at menopause. It is three times more common in females than in males. Risk factors include:  Other family members with hidradenitis suppurativa  Obesity and insulin resistance/metabolic syndrome  Cigarette smoking  Follicular occlusion disorders: acne conglobata, dissecting cellulitis, pilonidal sinus  Inflammatory bowel disease (Crohn disease)  Rare autoinflammatory syndromes associated with abnormalities of PSTPIP1 gene.*     * PAPA syndrome (pyogenic arthritis, pyoderma gangrenosum and acne), PASH syndrome (pyoderma gangrenosum, acne, suppurative hidradenitis) and PAPASH syndrome (pyogenic arthritis, pyoderma gangrenosum, acne, suppurative hidradenitis).     PROCEDURE:  INTRALESIONAL STEROID INJECTION (KENALOG INJECTION)    Purpose: Triamcinolone is a synthetic glucocorticoid corticosteroid that has marked anti-inflammatory action. It is prepared in sterile aqueous suspension suitable for injecting directly into a lesion on or immediately below the skin to treat a dermal inflammatory process.     Indications: It is indicated for alopecia areata; inflammatory acne cysts; discoid lupus erythematosus; keloids and hypertrophic scars; inflammatory lesions of granuloma annulare, lichen planus, lichen simplex chronicus (neurodermatitis), psoriatic plaques, and other localized inflammatory skin conditions.     Potential Side Effects: I understand that triamcinolone injection can potentially cause early and/or delayed adverse effects such as:    Pain    Impaired wound healing    Increased hair growth    Bleeding    White or brown marks    Steroid acne    Infection    Telangiectasia    Skin thinning    Cutaneous and subcutaneous lipoatrophy (most common) appearing as skin indentations or dimples around the injection sites a few weeks after treatment     PROCEDURE NOTE:  After verbal and written consent were obtained, the to-be-treated area was wiped and  cleaned with rubbing alcohol 70%.      Then, a total of 0.1 mL of Kenalog CONCENTRATION:  40 mg/mL   (Lot# [id number]; Expiration 04/2026, NDC#: 7669-6647-50) was injected intralesionally into a total of 1 lesion/s on the following anatomic areas:  right inguinal fold using a 1-mL syringe and a 30 1/2-gauge needle.      There was less than 1 mL of blood loss and little to no discomfort.  The area was bandaged with a Band-aid.  The patient tolerated the procedure well and remained in the office for observation.  With no signs of an adverse reaction, the patient was eventually discharged from clinic.       Abram Cisneros MD  PGY-II Dermatology Resident     Scribe Attestation      I,:  Maria Luisa Cummings MA am acting as a scribe while in the presence of the attending physician.:       I,:  Greta Wolfe MD personally performed the services described in this documentation    as scribed in my presence.:

## 2024-12-09 NOTE — TELEPHONE ENCOUNTER
----- Message from Juan Clinton MD sent at 2/10/2019  6:49 PM EST -----  Culture negative patient should continue  Topical treatmentfor inflammatory process
Informed patient
36.6

## 2025-01-07 ENCOUNTER — TELEPHONE (OUTPATIENT)
Dept: DERMATOLOGY | Facility: CLINIC | Age: 30
End: 2025-01-07

## 2025-01-07 NOTE — TELEPHONE ENCOUNTER
ADDENDUM 1/7/2025  Patient flares back to baseline. Will try switching from humira to cosentyx    Hidradenitis suppurativa: SUBQ: 300 mg at weeks 0, 1, 2, 3, and 4 followed by 300 mg every 4 weeks

## 2025-01-08 ENCOUNTER — OFFICE VISIT (OUTPATIENT)
Dept: URGENT CARE | Facility: CLINIC | Age: 30
End: 2025-01-08
Payer: COMMERCIAL

## 2025-01-08 VITALS
TEMPERATURE: 97.7 F | BODY MASS INDEX: 33.12 KG/M2 | HEART RATE: 81 BPM | OXYGEN SATURATION: 100 % | DIASTOLIC BLOOD PRESSURE: 76 MMHG | WEIGHT: 199 LBS | RESPIRATION RATE: 18 BRPM | SYSTOLIC BLOOD PRESSURE: 124 MMHG

## 2025-01-08 DIAGNOSIS — S46.911A SHOULDER STRAIN, RIGHT, INITIAL ENCOUNTER: ICD-10-CM

## 2025-01-08 DIAGNOSIS — M62.838 TRAPEZIUS MUSCLE SPASM: Primary | ICD-10-CM

## 2025-01-08 PROCEDURE — 99213 OFFICE O/P EST LOW 20 MIN: CPT | Performed by: PHYSICIAN ASSISTANT

## 2025-01-08 PROCEDURE — S9083 URGENT CARE CENTER GLOBAL: HCPCS | Performed by: PHYSICIAN ASSISTANT

## 2025-01-08 RX ORDER — METHYLPREDNISOLONE 4 MG/1
TABLET ORAL
Qty: 21 TABLET | Refills: 0 | Status: SHIPPED | OUTPATIENT
Start: 2025-01-08

## 2025-01-08 RX ORDER — METHOCARBAMOL 500 MG/1
500 TABLET, FILM COATED ORAL 4 TIMES DAILY
Qty: 30 TABLET | Refills: 0 | Status: SHIPPED | OUTPATIENT
Start: 2025-01-08

## 2025-01-08 NOTE — PATIENT INSTRUCTIONS
Discussed with patient how she likely has a muscle spasm and strain from sleeping on the couch and in different positions.    Recommended oral steroid.  Recommended muscle relaxer to use as needed.  Advised that this medication will make her drowsy and to take the medicine if she will not be operating heavy machinery or before bedtime.    Advised to use heat daily.  She can also try gentle massage of the neck/back/shoulder area.  Follow up with PCP in 3-5 days.  Proceed to  ER if symptoms worsen.    If tests are performed, our office will contact you with results only if changes need to made to the care plan discussed with you at the visit. You can review your full results on [facility].

## 2025-01-08 NOTE — PROGRESS NOTES
[facility]        NAME: Uzair Jean is a 29 y.o. female  : 1995    MRN: [id number]  DATE: 2025  TIME: 6:48 PM    Assessment and Plan   Trapezius muscle spasm [M62.838]  1. Trapezius muscle spasm  methylPREDNISolone 4 MG tablet therapy pack    methocarbamol (ROBAXIN) 500 mg tablet      2. Shoulder strain, right, initial encounter  methylPREDNISolone 4 MG tablet therapy pack    methocarbamol (ROBAXIN) 500 mg tablet            Patient Instructions     Patient Instructions   Discussed with patient how she likely has a muscle spasm and strain from sleeping on the couch and in different positions.    Recommended oral steroid.  Recommended muscle relaxer to use as needed.  Advised that this medication will make her drowsy and to take the medicine if she will not be operating heavy machinery or before bedtime.    Advised to use heat daily.  She can also try gentle massage of the neck/back/shoulder area.  Follow up with PCP in 3-5 days.  Proceed to  ER if symptoms worsen.    If tests are performed, our office will contact you with results only if changes need to made to the care plan discussed with you at the visit. [facility]        Chief Complaint     Chief Complaint   Patient presents with    Back Pain     Pt c/o right shoulder back pain for the past 3 weeks was sleeping on couch for a while and moved positions and pulled it         History of Present Illness       Patient presents today for evaluation of right shoulder and back pain that been going on for the past 3 weeks.  She states that she has been sleeping on a couch recently and when she removes/adjust her position she thinks she pulled her shoulder muscles.        Review of Systems   Review of Systems   Musculoskeletal:  Positive for arthralgias and myalgias.   All other systems reviewed and are negative.        Current Medications       Current Outpatient Medications:     Adalimumab  (Humira, 2 Pen,) 80 MG/0.8ML PNKT, Inject 80mg on week 6 then every other week there after for maintenance dose., Disp: 2 each, Rfl: 10    Humira, 2 Pen, 80 MG/0.8ML AJKT, , Disp: , Rfl:     methocarbamol (ROBAXIN) 500 mg tablet, Take 1 tablet (500 mg total) by mouth 4 (four) times a day, Disp: 30 tablet, Rfl: 0    methylPREDNISolone 4 MG tablet therapy pack, Use as directed on package, Disp: 21 tablet, Rfl: 0    Current Allergies     Allergies as of 01/08/2025 - Reviewed 01/08/2025   Allergen Reaction Noted    Oxycodone-acetaminophen Drowsiness 07/03/2018    Percolone [oxycodone] GI Intolerance 06/01/2017            The following portions of the patient's history were reviewed and updated as appropriate: allergies, current medications, past family history, past medical history, past social history, past surgical history and problem list.     Past Medical History:   Diagnosis Date    Abnormal Pap smear of cervix     Hidradenitis     gali buttocks and axilla - open areas - draining intermittently    History of COVID-19     12/21/21- no hospitalization    HPV in female     Wears glasses        Past Surgical History:   Procedure Laterality Date    AXILLARY HIDRADENITIS EXCISION Left 3/15/2022    Procedure: EXCISION HIDRADENITIS AXILLARY;  Surgeon: Rhonda Shen MD;  Location: [facility];  Service: General    AXILLARY HIDRADENITIS EXCISION Right 9/2/2022    Procedure: EXCISION HIDRADENITIS AXILLARY;  Surgeon: Rhonda Shen MD;  Location: [facility];  Service: General    SKIN BIOPSY      WOUND DEBRIDEMENT Bilateral 2/11/2022    Procedure: EXCISIONAL DEBRIDEMENT of bilateral buttocks;  Surgeon: Rhonda Shen MD;  Location: [facility];  Service: General       Family History   Problem Relation Age of Onset    Endocrine tumor Mother         pituitary    Alcohol abuse Father     ADD / ADHD Father     Asthma Brother     Diabetes Maternal Grandmother     Hypertension Maternal Grandmother     Diabetes Paternal  Grandmother          Medications have been verified.        Objective   /76   Pulse 81   Temp 97.7 °F (36.5 °C) (Tympanic)   Resp 18   Wt 90.3 kg (199 lb)   SpO2 100%   BMI 33.12 kg/m²        Physical Exam     Physical Exam  Vitals and nursing note reviewed.   Constitutional:       Appearance: Normal appearance.   Musculoskeletal:      Right shoulder: Tenderness present. No bony tenderness. Normal range of motion.      Cervical back: Spasms and tenderness present. Pain with movement present.   Skin:     General: Skin is warm and dry.   Neurological:      General: No focal deficit present.      Mental Status: She is alert and oriented to person, place, and time.   Psychiatric:         Mood and Affect: Mood normal.         Behavior: Behavior normal.

## 2025-01-16 NOTE — TELEPHONE ENCOUNTER
PA for Cosentx 300mg UnoReady SUBMITTED to [facility]    via    []CMM-KEY:   []Surescripts-Case ID #   []Availity-Auth ID # NDC #   []Faxed to plan   [x]Other website [facility] Portal Prior Auth (EOC) ID:  [id number]  []Phone call Case ID #     [x]PA sent as URGENT    All office notes, labs and other pertaining documents and studies sent. Clinical questions answered. Awaiting determination from insurance company.     Turnaround time for your insurance to make a decision on your Prior Authorization can take 7-21 business days.

## 2025-01-25 ENCOUNTER — OFFICE VISIT (OUTPATIENT)
Dept: URGENT CARE | Facility: CLINIC | Age: 30
End: 2025-01-25
Payer: COMMERCIAL

## 2025-01-25 VITALS
RESPIRATION RATE: 19 BRPM | SYSTOLIC BLOOD PRESSURE: 137 MMHG | BODY MASS INDEX: 32.78 KG/M2 | TEMPERATURE: 98 F | OXYGEN SATURATION: 98 % | WEIGHT: 197 LBS | DIASTOLIC BLOOD PRESSURE: 78 MMHG | HEART RATE: 70 BPM

## 2025-01-25 DIAGNOSIS — R21 RASH AND NONSPECIFIC SKIN ERUPTION: Primary | ICD-10-CM

## 2025-01-25 PROCEDURE — S9083 URGENT CARE CENTER GLOBAL: HCPCS | Performed by: NURSE PRACTITIONER

## 2025-01-25 PROCEDURE — 99213 OFFICE O/P EST LOW 20 MIN: CPT | Performed by: NURSE PRACTITIONER

## 2025-01-25 RX ORDER — MOMETASONE FUROATE 1 MG/G
CREAM TOPICAL DAILY
Qty: 45 G | Refills: 0 | Status: SHIPPED | OUTPATIENT
Start: 2025-01-25

## 2025-01-25 RX ORDER — PREDNISONE 20 MG/1
40 TABLET ORAL DAILY
Qty: 10 TABLET | Refills: 0 | Status: SHIPPED | OUTPATIENT
Start: 2025-01-25 | End: 2025-01-30

## 2025-01-25 RX ORDER — HYDROXYZINE HYDROCHLORIDE 25 MG/1
25 TABLET, FILM COATED ORAL EVERY 6 HOURS PRN
Qty: 30 TABLET | Refills: 0 | Status: SHIPPED | OUTPATIENT
Start: 2025-01-25

## 2025-01-25 NOTE — PROGRESS NOTES
[facility]        NAME: Uzair Jean is a 29 y.o. female  : 1995    MRN: [id number]  DATE: 2025  TIME: 12:55 PM    Assessment and Plan   Rash and nonspecific skin eruption [R21]  1. Rash and nonspecific skin eruption  predniSONE 20 mg tablet    hydrOXYzine HCL (ATARAX) 25 mg tablet    mometasone (ELOCON) 0.1 % cream        Advised to start prednisone as directed, take with food. Can use hydroxyzine prn for itching. Mix mometasone with plain lotion and apply prn for itching. Increase water intake. Avoid fragrance or dyed products.     Patient Instructions       Follow up with PCP in 3-5 days.  Proceed to  ER if symptoms worsen.    If tests are performed, our office will contact you with results only if changes need to made to the care plan discussed with you at the visit. You can review your full results on [facility].    Chief Complaint     Chief Complaint   Patient presents with    Rash     Rash for 3 weeks under breast area, Itchy, feels better in clothes.          History of Present Illness       Hx of hydradenitis suppurativa. Rash started under her breast then started to spread to torso, back and legs. Very itching and irritated.     Rash  This is a new problem. The current episode started 1 to 4 weeks ago (3 weeks). The affected locations include the chest. The problem is moderate. The rash is characterized by itchiness. Pertinent negatives include no fatigue or fever.       Review of Systems   Review of Systems   Constitutional:  Negative for chills, fatigue and fever.   Musculoskeletal:  Negative for arthralgias and myalgias.   Skin:  Positive for rash.   Hematological:  Negative for adenopathy. Does not bruise/bleed easily.         Current Medications       Current Outpatient Medications:     Adalimumab (Humira, 2 Pen,) 80 MG/0.8ML PNKT, Inject 80mg on week 6 then every other week there after for maintenance dose., Disp: 2 each, Rfl: 10    hydrOXYzine HCL (ATARAX)  25 mg tablet, Take 1 tablet (25 mg total) by mouth every 6 (six) hours as needed for itching, Disp: 30 tablet, Rfl: 0    mometasone (ELOCON) 0.1 % cream, Apply topically daily, Disp: 45 g, Rfl: 0    predniSONE 20 mg tablet, Take 2 tablets (40 mg total) by mouth daily for 5 days, Disp: 10 tablet, Rfl: 0    Humira, 2 Pen, 80 MG/0.8ML AJKT, , Disp: , Rfl:     methocarbamol (ROBAXIN) 500 mg tablet, Take 1 tablet (500 mg total) by mouth 4 (four) times a day (Patient not taking: Reported on 1/25/2025), Disp: 30 tablet, Rfl: 0    methylPREDNISolone 4 MG tablet therapy pack, Use as directed on package (Patient not taking: Reported on 1/25/2025), Disp: 21 tablet, Rfl: 0    Current Allergies     Allergies as of 01/25/2025 - Reviewed 01/25/2025   Allergen Reaction Noted    Oxycodone-acetaminophen Drowsiness 07/03/2018    Percolone [oxycodone] GI Intolerance 06/01/2017            The following portions of the patient's history were reviewed and updated as appropriate: allergies, current medications, past family history, past medical history, past social history, past surgical history and problem list.     Past Medical History:   Diagnosis Date    Abnormal Pap smear of cervix     Hidradenitis     gali buttocks and axilla - open areas - draining intermittently    History of COVID-19     12/21/21- no hospitalization    HPV in female     Wears glasses        Past Surgical History:   Procedure Laterality Date    AXILLARY HIDRADENITIS EXCISION Left 3/15/2022    Procedure: EXCISION HIDRADENITIS AXILLARY;  Surgeon: Rhonda Shen MD;  Location: [facility];  Service: General    AXILLARY HIDRADENITIS EXCISION Right 9/2/2022    Procedure: EXCISION HIDRADENITIS AXILLARY;  Surgeon: Rhonda Shen MD;  [facility];  Service: General    SKIN BIOPSY      WOUND DEBRIDEMENT Bilateral 2/11/2022    Procedure: EXCISIONAL DEBRIDEMENT of bilateral buttocks;  Surgeon: Rhonda Shen MD;  Location: [facility];  Service: General        Family History   Problem Relation Age of Onset    Endocrine tumor Mother         pituitary    Alcohol abuse Father     ADD / ADHD Father     Asthma Brother     Diabetes Maternal Grandmother     Hypertension Maternal Grandmother     Diabetes Paternal Grandmother          Medications have been verified.        Objective   /78   Pulse 70   Temp 98 °F (36.7 °C)   Resp 19   Wt 89.4 kg (197 lb)   SpO2 98%   BMI 32.78 kg/m²        Physical Exam     Physical Exam  Vitals and nursing note reviewed.   Constitutional:       General: She is not in acute distress.     Appearance: Normal appearance. She is not ill-appearing.   HENT:      Head: Normocephalic and atraumatic.   Cardiovascular:      Rate and Rhythm: Normal rate and regular rhythm.      Heart sounds: Normal heart sounds, S1 normal and S2 normal.   Pulmonary:      Effort: Pulmonary effort is normal. No accessory muscle usage.      Breath sounds: Normal breath sounds. No wheezing.   Skin:     General: Skin is warm and dry.      Capillary Refill: Capillary refill takes less than 2 seconds.      Findings: Erythema and rash present. Rash is papular.   Neurological:      General: No focal deficit present.      Mental Status: She is alert and oriented to person, place, and time.      Motor: Motor function is intact.   Psychiatric:         Attention and Perception: Attention and perception normal.         Mood and Affect: Mood and affect normal.         Speech: Speech normal.         Behavior: Behavior normal. Behavior is cooperative.         Thought Content: Thought content normal.

## 2025-01-28 ENCOUNTER — PATIENT MESSAGE (OUTPATIENT)
Dept: DERMATOLOGY | Facility: CLINIC | Age: 30
End: 2025-01-28

## 2025-02-03 ENCOUNTER — OFFICE VISIT (OUTPATIENT)
Dept: URGENT CARE | Facility: CLINIC | Age: 30
End: 2025-02-03
Payer: COMMERCIAL

## 2025-02-03 VITALS
HEART RATE: 81 BPM | TEMPERATURE: 97.9 F | BODY MASS INDEX: 32.52 KG/M2 | RESPIRATION RATE: 19 BRPM | WEIGHT: 195.2 LBS | DIASTOLIC BLOOD PRESSURE: 82 MMHG | HEIGHT: 65 IN | SYSTOLIC BLOOD PRESSURE: 110 MMHG | OXYGEN SATURATION: 98 %

## 2025-02-03 DIAGNOSIS — L30.9 DERMATITIS: Primary | ICD-10-CM

## 2025-02-03 PROCEDURE — S9083 URGENT CARE CENTER GLOBAL: HCPCS | Performed by: PHYSICIAN ASSISTANT

## 2025-02-03 PROCEDURE — 99214 OFFICE O/P EST MOD 30 MIN: CPT | Performed by: PHYSICIAN ASSISTANT

## 2025-02-03 RX ORDER — TRIAMCINOLONE ACETONIDE 5 MG/G
CREAM TOPICAL 2 TIMES DAILY
Qty: 60 G | Refills: 0 | Status: SHIPPED | OUTPATIENT
Start: 2025-02-03 | End: 2025-02-13

## 2025-02-03 NOTE — PROGRESS NOTES
[facility]        NAME: Uzair Jean is a 29 y.o. female  : 1995    MRN: [id number]  DATE: February 3, 2025  TIME: 11:32 AM      Assessment and Plan     Dermatitis [L30.9]  1. Dermatitis  triamcinolone (KENALOG) 0.5 % cream        Medical Decision Making Note:   Likely some form of dermatitis  Was going to prescribe oral steroids again but patient was just on steroids twice in this past month and had a steroid injection shortly before that  Will only prescribe topical steroids at this time    Patient Instructions   There are no Patient Instructions on file for this visit.     Follow up with primary care provider.   Go to ER if symptoms worsen.    Chief Complaint     Chief Complaint   Patient presents with    Rash     Pt c/o rash underneath breasts and now traveling to the stomach and legs. Rash is itches and red. Took steroids- helped a little bit.  Pt was seen here on the  for the same concerns.          History of Present Illness     Patient presents with an itchy, red rash to the bilateral thighs and bilateral arms for 1 week.  She was here on 2025 for similar rash that started under the breast that was traveling down the stomach and was given oral steroids, topical steroids, and hydroxyzine which helped a little bit.        Review of Systems     Review of Systems   Constitutional:  Negative for chills, fatigue and fever.   HENT:  Negative for congestion, ear pain, postnasal drip, rhinorrhea, sinus pressure, sinus pain and sore throat.    Eyes:  Negative for pain and visual disturbance.   Respiratory:  Negative for cough, chest tightness and shortness of breath.    Cardiovascular:  Negative for chest pain and palpitations.   Gastrointestinal:  Negative for abdominal pain, diarrhea, nausea and vomiting.   Genitourinary:  Negative for dysuria and hematuria.   Musculoskeletal:  Negative for arthralgias, back pain and myalgias.   Skin:  Positive for rash.   Neurological:  Negative for  dizziness, seizures, syncope, numbness and headaches.   All other systems reviewed and are negative.        Current Medications       Current Outpatient Medications:     Adalimumab (Humira, 2 Pen,) 80 MG/0.8ML PNKT, Inject 80mg on week 6 then every other week there after for maintenance dose., Disp: 2 each, Rfl: 10    Humira, 2 Pen, 80 MG/0.8ML AJKT, , Disp: , Rfl:     hydrOXYzine HCL (ATARAX) 25 mg tablet, Take 1 tablet (25 mg total) by mouth every 6 (six) hours as needed for itching, Disp: 30 tablet, Rfl: 0    mometasone (ELOCON) 0.1 % cream, Apply topically daily, Disp: 45 g, Rfl: 0    triamcinolone (KENALOG) 0.5 % cream, Apply topically 2 (two) times a day for 10 days, Disp: 60 g, Rfl: 0    methocarbamol (ROBAXIN) 500 mg tablet, Take 1 tablet (500 mg total) by mouth 4 (four) times a day (Patient not taking: Reported on 2/3/2025), Disp: 30 tablet, Rfl: 0    methylPREDNISolone 4 MG tablet therapy pack, Use as directed on package (Patient not taking: Reported on 2/3/2025), Disp: 21 tablet, Rfl: 0    Current Allergies     Allergies as of 02/03/2025 - Reviewed 02/03/2025   Allergen Reaction Noted    Oxycodone-acetaminophen Drowsiness 07/03/2018    Percolone [oxycodone] GI Intolerance 06/01/2017              The following portions of the patient's history were reviewed and updated as appropriate: allergies, current medications, past family history, past medical history, past social history, past surgical history, and problem list.     Past Medical History:   Diagnosis Date    Abnormal Pap smear of cervix     Hidradenitis     gali buttocks and axilla - open areas - draining intermittently    History of COVID-19     12/21/21- no hospitalization    HPV in female     Wears glasses        Past Surgical History:   Procedure Laterality Date    AXILLARY HIDRADENITIS EXCISION Left 3/15/2022    Procedure: EXCISION HIDRADENITIS AXILLARY;  Surgeon: Rhonda Shen MD;  Location: [facility];  Service: General    AXILLARY  "HIDRADENITIS EXCISION Right 9/2/2022    Procedure: EXCISION HIDRADENITIS AXILLARY;  Surgeon: Rhonda Shen MD;  [facility]: [facility];  Service: General    SKIN BIOPSY      WOUND DEBRIDEMENT Bilateral 2/11/2022    Procedure: EXCISIONAL DEBRIDEMENT of bilateral buttocks;  Surgeon: Rhonda Shen MD;  Location: [facility];  Service: General       Family History   Problem Relation Age of Onset    Endocrine tumor Mother         pituitary    Alcohol abuse Father     ADD / ADHD Father     Asthma Brother     Diabetes Maternal Grandmother     Hypertension Maternal Grandmother     Diabetes Paternal Grandmother          Medications have been verified.        Objective     /82   Pulse 81   Temp 97.9 °F (36.6 °C)   Resp 19   Ht 5' 5\" (1.651 m)   Wt 88.5 kg (195 lb 3.2 oz)   SpO2 98%   BMI 32.48 kg/m²   No LMP recorded.         Physical Exam     Physical Exam  Vitals and nursing note reviewed.   Constitutional:       Appearance: Normal appearance. She is normal weight.   HENT:      Head: Normocephalic and atraumatic.   Cardiovascular:      Rate and Rhythm: Normal rate.   Pulmonary:      Effort: Pulmonary effort is normal.   Skin:     General: Skin is warm and dry.      Findings: Rash present.      Comments: Erythematous maculopapular rash to the bilateral thighs and bilateral forearms   Neurological:      General: No focal deficit present.      Mental Status: She is alert and oriented to person, place, and time.   Psychiatric:         Mood and Affect: Mood normal.         Behavior: Behavior normal.       "

## 2025-02-05 ENCOUNTER — TELEPHONE (OUTPATIENT)
Dept: DERMATOLOGY | Facility: CLINIC | Age: 30
End: 2025-02-05

## 2025-02-05 DIAGNOSIS — R21 RASH: Primary | ICD-10-CM

## 2025-02-05 DIAGNOSIS — L73.2 HIDRADENITIS: Primary | ICD-10-CM

## 2025-02-05 RX ORDER — TRIAMCINOLONE ACETONIDE 1 MG/G
CREAM TOPICAL 2 TIMES DAILY
Qty: 454 G | Refills: 0 | Status: SHIPPED | OUTPATIENT
Start: 2025-02-05

## 2025-02-05 RX ORDER — SODIUM CHLORIDE 9 MG/ML
20 INJECTION, SOLUTION INTRAVENOUS ONCE
OUTPATIENT
Start: 2025-02-17

## 2025-02-05 RX ORDER — DIPHENHYDRAMINE HCL 25 MG
25 TABLET ORAL ONCE
OUTPATIENT
Start: 2025-02-17

## 2025-02-05 RX ORDER — METHYLPREDNISOLONE SODIUM SUCCINATE 40 MG/ML
40 INJECTION, POWDER, LYOPHILIZED, FOR SOLUTION INTRAMUSCULAR; INTRAVENOUS ONCE
OUTPATIENT
Start: 2025-02-17

## 2025-02-05 RX ORDER — ACETAMINOPHEN 325 MG/1
650 TABLET ORAL ONCE
OUTPATIENT
Start: 2025-02-17

## 2025-02-05 NOTE — TELEPHONE ENCOUNTER
ADDENDUM 2/5/2025  Cosentyx denied. Now getting psoriasiform rash, possibly from humira. Will try getting infliximab infusion 5 mg/kg every 6 weeks      Please do PA for infliximab

## 2025-02-14 ENCOUNTER — OFFICE VISIT (OUTPATIENT)
Dept: FAMILY MEDICINE CLINIC | Facility: CLINIC | Age: 30
End: 2025-02-14
Payer: COMMERCIAL

## 2025-02-14 VITALS
BODY MASS INDEX: 32.02 KG/M2 | SYSTOLIC BLOOD PRESSURE: 114 MMHG | WEIGHT: 192.2 LBS | HEART RATE: 83 BPM | DIASTOLIC BLOOD PRESSURE: 82 MMHG | TEMPERATURE: 99.2 F | RESPIRATION RATE: 16 BRPM | OXYGEN SATURATION: 99 % | HEIGHT: 65 IN

## 2025-02-14 DIAGNOSIS — L30.9 DERMATITIS: Primary | ICD-10-CM

## 2025-02-14 PROCEDURE — 99213 OFFICE O/P EST LOW 20 MIN: CPT | Performed by: FAMILY MEDICINE

## 2025-02-14 RX ORDER — LORATADINE 10 MG/1
10 TABLET ORAL DAILY
Qty: 30 TABLET | Refills: 0 | Status: SHIPPED | OUTPATIENT
Start: 2025-02-14

## 2025-02-14 RX ORDER — PREDNISONE 10 MG/1
TABLET ORAL
Qty: 18 TABLET | Refills: 0 | Status: SHIPPED | OUTPATIENT
Start: 2025-02-14 | End: 2025-02-23

## 2025-02-14 RX ORDER — FAMOTIDINE 20 MG/1
20 TABLET, FILM COATED ORAL 2 TIMES DAILY
Qty: 60 TABLET | Refills: 0 | Status: SHIPPED | OUTPATIENT
Start: 2025-02-14

## 2025-02-14 NOTE — PROGRESS NOTES
Name: Uzair Jean      : 1995      MRN: [id number]  Encounter Provider: Hai Moeller MD  Encounter Date: 2025   Encounter department: [facility]  :                  Assessment & Plan  Dermatitis  Pruritic psoriaform rash > 3 weeks. Thought to be a reaction from Humira injections and is waiting for the approval for the infusions. Completed a 5 day burst of steroids and is apply topical steroids without significant relief. Will order a steroid taper this time and added H2 and H1 blocker. F/u with dermatology   Orders:    loratadine (CLARITIN) 10 mg tablet; Take 1 tablet (10 mg total) by mouth daily    famotidine (PEPCID) 20 mg tablet; Take 1 tablet (20 mg total) by mouth 2 (two) times a day    predniSONE 10 mg tablet; Take 3 tablets (30 mg total) by mouth daily for 3 days, THEN 2 tablets (20 mg total) daily for 3 days, THEN 1 tablet (10 mg total) daily for 3 days.           History of Present Illness   Presents with a pruritic rash  It appeared > 3 weeks ago   Was on Humira for HS  Saw dermatologist and thought it was possibly a reaction to Humira and was stopped  Went to the  and prescribed steroids. Rash improved but returned once she completed the 5 day course  She went back to  and prescribed topical steroid cream but is not helping   Rash is diffuse.    Rash  This is a new problem. The current episode started 1 to 4 weeks ago. The problem has been gradually worsening since onset. The affected locations include the scalp, neck, chest, torso, back, abdomen, left arm, left elbow, left upper leg, left leg, right arm, right elbow, right upper leg and right leg. The rash is characterized by dryness, pain, redness, bruising and itchiness. It is nothing and unknown if there was an exposure to a precipitant. She was exposed to nothing and unknown. Pertinent negatives include no anorexia, congestion, cough, diarrhea, facial edema, fatigue, fever, joint pain, rhinorrhea,  "shortness of breath, sore throat or vomiting. Past treatments include topical steroids and oral steroids. The treatment provided mild relief. There is no history of allergies, asthma, eczema or varicella. There were no sick contacts.   Review of Systems   Constitutional:  Negative for fatigue and fever.   HENT:  Negative for congestion, rhinorrhea and sore throat.    Eyes:  Negative for pain.   Respiratory:  Negative for cough and shortness of breath.    Gastrointestinal:  Negative for anorexia, diarrhea and vomiting.   Musculoskeletal:  Negative for joint pain.   Skin:  Positive for rash.       Objective   /82 (BP Location: Right arm, Patient Position: Sitting, Cuff Size: Large) Comment (BP Location): lower arm  Pulse 83   Temp 99.2 °F (37.3 °C) (Tympanic)   Resp 16   Ht 5' 5\" (1.651 m)   Wt 87.2 kg (192 lb 3.2 oz)   SpO2 99%   BMI 31.98 kg/m²      Physical Exam  Skin:     Findings: Rash (see photo) present.              Answers submitted by the patient for this visit:  Rash Questionnaire (Submitted on 2/14/2025)  Chief Complaint: Rash  nail changes: No    "

## 2025-02-20 ENCOUNTER — OFFICE VISIT (OUTPATIENT)
Dept: DERMATOLOGY | Facility: CLINIC | Age: 30
End: 2025-02-20
Payer: COMMERCIAL

## 2025-02-20 VITALS — BODY MASS INDEX: 31.99 KG/M2 | WEIGHT: 192 LBS | TEMPERATURE: 98 F | HEIGHT: 65 IN

## 2025-02-20 DIAGNOSIS — R21 RASH: Primary | ICD-10-CM

## 2025-02-20 PROCEDURE — 87070 CULTURE OTHR SPECIMN AEROBIC: CPT | Performed by: DERMATOLOGY

## 2025-02-20 PROCEDURE — 99213 OFFICE O/P EST LOW 20 MIN: CPT | Performed by: DERMATOLOGY

## 2025-02-20 PROCEDURE — 87205 SMEAR GRAM STAIN: CPT | Performed by: DERMATOLOGY

## 2025-02-20 NOTE — PATIENT INSTRUCTIONS
RASH    Physical Exam:  (Anatomic Location); (Size and Morphological Description); (Differential Diagnosis):  Scalp, trunk, extremities with crusted scaly papules and plaques, possible drug reaction to Humira vs impetigo      Assessment and Plan:  Based on a thorough discussion of this condition and the management approach to it (including a comprehensive discussion of the known risks, side effects and potential benefits of treatment), the patient (family) agrees to implement the following specific plan:  Stop taking Humira  Start infusion on the 26th as planned, monitor rash  Continue current treatment regimen   Wound culture taken in office today  Keep in touch with Dr Aiden hoyos My Chart with how you're doing

## 2025-02-20 NOTE — PROGRESS NOTES
"[facility] Note     Patient Name: Uzair Jean  Encounter Date: 02/20/2025     Have you been cared for by a [facility] Dermatologist in the last 3 years and, if so, which description applies to you?    Yes.  I have been here within the last 3 years, and my medical history has NOT changed since that time.  I am FEMALE/of child-bearing potential.    REVIEW OF SYSTEMS:  Have you recently had or currently have any of the following? No changes in my recent health.   PAST MEDICAL HISTORY:  Have you personally ever had or currently have any of the following?  If \"YES,\" then please provide more detail. No changes in my medical history.       FAMILY HISTORY:  Any \"first degree relatives\" (parent, brother, sister, or child) with the following?    No changes in my family's known health.   PATIENT EXPERIENCE:    Do you want the Dermatologist to perform a COMPLETE skin exam today including a clinical examination under the \"bra and underwear\" areas?  NO  If necessary, do we have your permission to call and leave a detailed message on your Preferred Phone number that includes your specific medical information?  Yes      Allergies   Allergen Reactions    Oxycodone-Acetaminophen Drowsiness    Percolone [Oxycodone] GI Intolerance      Current Outpatient Medications:     Adalimumab (Humira, 2 Pen,) 80 MG/0.8ML PNKT, Inject 80mg on week 6 then every other week there after for maintenance dose., Disp: 2 each, Rfl: 10    famotidine (PEPCID) 20 mg tablet, Take 1 tablet (20 mg total) by mouth 2 (two) times a day, Disp: 60 tablet, Rfl: 0    Humira, 2 Pen, 80 MG/0.8ML AJKT, , Disp: , Rfl:     hydrOXYzine HCL (ATARAX) 25 mg tablet, Take 1 tablet (25 mg total) by mouth every 6 (six) hours as needed for itching, Disp: 30 tablet, Rfl: 0    loratadine (CLARITIN) 10 mg tablet, Take 1 tablet (10 mg total) by mouth daily, Disp: 30 tablet, Rfl: 0    methocarbamol (ROBAXIN) 500 mg tablet, Take 1 tablet (500 mg total) by mouth 4 " (four) times a day (Patient not taking: Reported on 1/25/2025), Disp: 30 tablet, Rfl: 0    predniSONE 10 mg tablet, Take 3 tablets (30 mg total) by mouth daily for 3 days, THEN 2 tablets (20 mg total) daily for 3 days, THEN 1 tablet (10 mg total) daily for 3 days., Disp: 18 tablet, Rfl: 0    triamcinolone (KENALOG) 0.1 % cream, Apply topically 2 (two) times a day, Disp: 454 g, Rfl: 0    triamcinolone (KENALOG) 0.5 % cream, Apply topically 2 (two) times a day for 10 days, Disp: 60 g, Rfl: 0          Whom besides the patient is providing clinical information about today's encounter?   NO ADDITIONAL HISTORIAN (patient alone provided history)    Physical Exam and Assessment/Plan by Diagnosis:    RASH    Physical Exam:  (Anatomic Location); (Size and Morphological Description); (Differential Diagnosis):  Scalp, trunk, extremities with crusted scaly papules and plaques, possible drug reaction to Humira vs impetigo  Pertinent Positives:  Pertinent Negatives:    Additional History of Present Condition:  rash started 3-4 weeks ago after starting Humira. Rash can be painful and it does itch. Rash is all over her body. Patient is currently on Prednisone (day 5) and using Triamcinolone.       Assessment and Plan:  Based on a thorough discussion of this condition and the management approach to it (including a comprehensive discussion of the known risks, side effects and potential benefits of treatment), the patient (family) agrees to implement the following specific plan:  Stop taking Humira  Start infliximab infusion on the 26th as planned, monitor rash  Continue current treatment regimen   Wound culture taken in office today  Keep in touch with Dr Wolfe vis My Chart with how you're doing    Scribe Attestation      I,:  Roseann Rosas MA am acting as a scribe while in the presence of the attending physician.:       I,:  Greta Wolfe MD personally performed the services described in this documentation    as scribed in my  presence.:

## 2025-02-23 LAB
BACTERIA WND AEROBE CULT: NORMAL
GRAM STN SPEC: NORMAL

## 2025-02-24 ENCOUNTER — TELEPHONE (OUTPATIENT)
Dept: INFUSION CENTER | Facility: CLINIC | Age: 30
End: 2025-02-24

## 2025-02-25 ENCOUNTER — TELEPHONE (OUTPATIENT)
Age: 30
End: 2025-02-25

## 2025-02-25 ENCOUNTER — RESULTS FOLLOW-UP (OUTPATIENT)
Dept: DERMATOLOGY | Facility: CLINIC | Age: 30
End: 2025-02-25

## 2025-02-25 ENCOUNTER — PATIENT MESSAGE (OUTPATIENT)
Dept: DERMATOLOGY | Facility: CLINIC | Age: 30
End: 2025-02-25

## 2025-02-25 DIAGNOSIS — L73.2 HIDRADENITIS: Primary | ICD-10-CM

## 2025-02-25 RX ORDER — ACETAMINOPHEN 325 MG/1
650 TABLET ORAL ONCE
Status: CANCELLED | OUTPATIENT
Start: 2025-02-26

## 2025-02-25 RX ORDER — METHYLPREDNISOLONE SODIUM SUCCINATE 40 MG/ML
40 INJECTION, POWDER, LYOPHILIZED, FOR SOLUTION INTRAMUSCULAR; INTRAVENOUS ONCE
Status: CANCELLED | OUTPATIENT
Start: 2025-02-26

## 2025-02-25 RX ORDER — SODIUM CHLORIDE 9 MG/ML
20 INJECTION, SOLUTION INTRAVENOUS ONCE
Status: CANCELLED | OUTPATIENT
Start: 2025-02-26

## 2025-02-25 RX ORDER — DIPHENHYDRAMINE HCL 25 MG
25 TABLET ORAL ONCE
Status: CANCELLED | OUTPATIENT
Start: 2025-02-26

## 2025-02-25 NOTE — TELEPHONE ENCOUNTER
Patient called and would like to let us know that his rash is not improving , his infliximab  infusion is tomorrow .   He still very itchy and would like an advise from Dr. Wolfe .

## 2025-02-26 ENCOUNTER — HOSPITAL ENCOUNTER (OUTPATIENT)
Dept: INFUSION CENTER | Facility: CLINIC | Age: 30
Discharge: HOME | End: 2025-02-26
Payer: COMMERCIAL

## 2025-02-26 VITALS
BODY MASS INDEX: 32.72 KG/M2 | SYSTOLIC BLOOD PRESSURE: 139 MMHG | WEIGHT: 196.4 LBS | RESPIRATION RATE: 20 BRPM | HEART RATE: 92 BPM | HEIGHT: 65 IN | DIASTOLIC BLOOD PRESSURE: 89 MMHG | TEMPERATURE: 98.3 F

## 2025-02-26 DIAGNOSIS — L73.2 HIDRADENITIS: Primary | ICD-10-CM

## 2025-02-26 RX ORDER — DIPHENHYDRAMINE HCL 25 MG
25 TABLET ORAL ONCE
Status: COMPLETED | OUTPATIENT
Start: 2025-02-26 | End: 2025-02-26

## 2025-02-26 RX ORDER — SODIUM CHLORIDE 9 MG/ML
20 INJECTION, SOLUTION INTRAVENOUS ONCE
Status: COMPLETED | OUTPATIENT
Start: 2025-02-26 | End: 2025-02-26

## 2025-02-26 RX ORDER — SODIUM CHLORIDE 9 MG/ML
20 INJECTION, SOLUTION INTRAVENOUS ONCE
OUTPATIENT
Start: 2025-04-09

## 2025-02-26 RX ORDER — METHYLPREDNISOLONE SODIUM SUCCINATE 40 MG/ML
40 INJECTION, POWDER, LYOPHILIZED, FOR SOLUTION INTRAMUSCULAR; INTRAVENOUS ONCE
Status: COMPLETED | OUTPATIENT
Start: 2025-02-26 | End: 2025-02-26

## 2025-02-26 RX ORDER — ACETAMINOPHEN 325 MG/1
650 TABLET ORAL ONCE
OUTPATIENT
Start: 2025-04-09

## 2025-02-26 RX ORDER — ACETAMINOPHEN 325 MG/1
650 TABLET ORAL ONCE
Status: COMPLETED | OUTPATIENT
Start: 2025-02-26 | End: 2025-02-26

## 2025-02-26 RX ORDER — DIPHENHYDRAMINE HCL 25 MG
25 TABLET ORAL ONCE
OUTPATIENT
Start: 2025-04-09

## 2025-02-26 RX ORDER — METHYLPREDNISOLONE SODIUM SUCCINATE 40 MG/ML
40 INJECTION, POWDER, LYOPHILIZED, FOR SOLUTION INTRAMUSCULAR; INTRAVENOUS ONCE
OUTPATIENT
Start: 2025-04-09

## 2025-02-26 RX ADMIN — ACETAMINOPHEN 650 MG: 325 TABLET, FILM COATED ORAL at 09:54

## 2025-02-26 RX ADMIN — DIPHENHYDRAMINE HYDROCHLORIDE 25 MG: 25 TABLET ORAL at 09:54

## 2025-02-26 RX ADMIN — METHYLPREDNISOLONE SODIUM SUCCINATE 40 MG: 40 INJECTION, POWDER, FOR SOLUTION INTRAMUSCULAR; INTRAVENOUS at 09:54

## 2025-02-26 RX ADMIN — SODIUM CHLORIDE 20 ML/HR: 0.9 INJECTION, SOLUTION INTRAVENOUS at 09:56

## 2025-02-26 RX ADMIN — INFLIXIMAB 436 MG: 100 INJECTION, POWDER, LYOPHILIZED, FOR SOLUTION INTRAVENOUS at 10:28

## 2025-02-26 NOTE — PROGRESS NOTES
Patient presents today for remicade infusion offering no complaints. PIV placed with positive blood return. Patient tolerated infusion well. PIV removed. AVS declined. Next appointment 4/9 at 1:00.

## 2025-03-04 DIAGNOSIS — R21 RASH: Primary | ICD-10-CM

## 2025-03-04 RX ORDER — PREDNISONE 10 MG/1
TABLET ORAL
Qty: 50 TABLET | Refills: 0 | Status: SHIPPED | OUTPATIENT
Start: 2025-03-04 | End: 2025-03-24

## 2025-03-09 DIAGNOSIS — L30.9 DERMATITIS: ICD-10-CM

## 2025-03-10 RX ORDER — LORATADINE 10 MG/1
10 TABLET ORAL DAILY
Qty: 90 TABLET | Refills: 1 | Status: SHIPPED | OUTPATIENT
Start: 2025-03-10

## 2025-03-10 RX ORDER — FAMOTIDINE 20 MG/1
20 TABLET, FILM COATED ORAL 2 TIMES DAILY
Qty: 180 TABLET | Refills: 1 | Status: SHIPPED | OUTPATIENT
Start: 2025-03-10

## 2025-03-17 ENCOUNTER — OFFICE VISIT (OUTPATIENT)
Dept: URGENT CARE | Facility: CLINIC | Age: 30
End: 2025-03-17
Payer: COMMERCIAL

## 2025-03-17 VITALS
SYSTOLIC BLOOD PRESSURE: 128 MMHG | HEART RATE: 84 BPM | WEIGHT: 196 LBS | DIASTOLIC BLOOD PRESSURE: 88 MMHG | BODY MASS INDEX: 32.62 KG/M2 | TEMPERATURE: 98 F | RESPIRATION RATE: 18 BRPM | OXYGEN SATURATION: 98 %

## 2025-03-17 DIAGNOSIS — L30.9 DERMATITIS: ICD-10-CM

## 2025-03-17 DIAGNOSIS — M79.671 BILATERAL FOOT PAIN: ICD-10-CM

## 2025-03-17 DIAGNOSIS — R21 RASH AND NONSPECIFIC SKIN ERUPTION: Primary | ICD-10-CM

## 2025-03-17 DIAGNOSIS — M79.672 BILATERAL FOOT PAIN: ICD-10-CM

## 2025-03-17 PROCEDURE — S9083 URGENT CARE CENTER GLOBAL: HCPCS | Performed by: PHYSICIAN ASSISTANT

## 2025-03-17 PROCEDURE — G0383 LEV 4 HOSP TYPE B ED VISIT: HCPCS | Performed by: PHYSICIAN ASSISTANT

## 2025-03-17 RX ORDER — CEPHALEXIN 500 MG/1
500 CAPSULE ORAL EVERY 8 HOURS SCHEDULED
Qty: 21 CAPSULE | Refills: 0 | Status: SHIPPED | OUTPATIENT
Start: 2025-03-17 | End: 2025-03-24

## 2025-03-17 RX ORDER — PREDNISONE 20 MG/1
TABLET ORAL
Qty: 18 TABLET | Refills: 0 | Status: SHIPPED | OUTPATIENT
Start: 2025-03-17 | End: 2025-03-26

## 2025-03-17 NOTE — PATIENT INSTRUCTIONS
Discussed with patient that the only other treatment option I can think of at this time would be to try an oral antibiotic as there may be an infectious component to it.  I would recommend another round of oral steroids as well.    Patient advised that she should continue to follow with dermatology for evaluation of this rash as they are the ones who would be able to do further testing and evaluation.    Follow up with PCP in 3-5 days.  Proceed to  ER if symptoms worsen.    If tests are performed, our office will contact you with results only if changes need to made to the care plan discussed with you at the visit. You can review your full results on [facility].

## 2025-03-17 NOTE — LETTER
March 17, 2025     Patient: Uzair Jean   YOB: 1995   Date of Visit: 3/17/2025       To Whom it May Concern:    Uzair Jean was seen in my clinic on 3/17/2025. She may return to work on 3/24/2025 .    If you have any questions or concerns, please don't hesitate to call.         Sincerely,          Zohra Kim PA-C        CC: No Recipients

## 2025-03-17 NOTE — PROGRESS NOTES
[facility]        NAME: Uzair Jean is a 29 y.o. female  : 1995    MRN: [id number]  DATE: 2025  TIME: 6:01 PM    Assessment and Plan   Rash and nonspecific skin eruption [R21]  1. Rash and nonspecific skin eruption  cephalexin (KEFLEX) 500 mg capsule    predniSONE 20 mg tablet      2. Dermatitis  cephalexin (KEFLEX) 500 mg capsule    predniSONE 20 mg tablet      3. Bilateral foot pain          Significant portion of time spent on record review:    Patient's chart was reviewed.  Patient was seen 2025 at the urgent care clinic.  She was started on hydroxyzine and given prednisone as well as a topical medication.  She then contacted dermatology for appointment and they could not get her in sooner, so she came back to the urgent care clinic.  She is seen again on February 3, 2025.  At that appointment she was given a different topical steroid to try.  She was seen by dermatology on 2025.  They gave her a prescription for famotidine, loratadine, and another round of prednisone.  Dermatology believes rash is a reaction due to her Humira injections.  She follow-up with dermatology again on 2025.  They did a wound culture and there was no infection found.  Patient contacted dermatology on March 3, 2025 due to the rash continuing the spread.  They gave her another course of oral steroids.    Patient Instructions     Patient Instructions   Discussed with patient that the only other treatment option I can think of at this time would be to try an oral antibiotic as there may be an infectious component to it.  I would recommend another round of oral steroids as well.    Patient advised that she should continue to follow with dermatology for evaluation of this rash as they are the ones who would be able to do further testing and evaluation.    Follow up with PCP in 3-5 days.  Proceed to  ER if symptoms worsen.    If tests are performed, our office will  contact you with results only if changes need to made to the care plan discussed with you at the visit. You can review your full results on [facility].        Chief Complaint     Chief Complaint   Patient presents with    Rash     Rash, following up, worse. Pain involved. Today. Spreading to feet.          History of Present Illness       Patient presents as a follow-up for a rash that she has had for the past month or 2.  She has been seen here for the rash as well as by dermatology several times.  She has been on multiple rounds of oral steroids as well as use multiple different topical steroids.  She states that the rash never seems to go away.  He states that it continues to spread and continues to get worse.  She states that dermatology thought it was related to her Humira injection and she did stop taking that.  The biggest reason why she is here today is because the rash has spread to her shins as well as to the bottom of her feet and it is significantly painful, itching, and irritating.  She states it hurts to put pressure on the bottom of her feet and to walk on them.  States that whenever the rash starts it starts off as little pustules which is what is starting to look like now on her feet.        Review of Systems   Review of Systems   Skin:  Positive for rash.   All other systems reviewed and are negative.        Current Medications       Current Outpatient Medications:     cephalexin (KEFLEX) 500 mg capsule, Take 1 capsule (500 mg total) by mouth every 8 (eight) hours for 7 days, Disp: 21 capsule, Rfl: 0    famotidine (PEPCID) 20 mg tablet, TAKE 1 TABLET BY MOUTH TWICE A DAY, Disp: 180 tablet, Rfl: 1    hydrOXYzine HCL (ATARAX) 25 mg tablet, Take 1 tablet (25 mg total) by mouth every 6 (six) hours as needed for itching, Disp: 30 tablet, Rfl: 0    loratadine (CLARITIN) 10 mg tablet, TAKE 1 TABLET BY MOUTH EVERY DAY, Disp: 90 tablet, Rfl: 1    predniSONE 20 mg tablet, Take 3 tablets (60 mg total)  by mouth daily for 3 days, THEN 2 tablets (40 mg total) daily for 3 days, THEN 1 tablet (20 mg total) daily for 3 days., Disp: 18 tablet, Rfl: 0    triamcinolone (KENALOG) 0.1 % cream, Apply topically 2 (two) times a day, Disp: 454 g, Rfl: 0    methocarbamol (ROBAXIN) 500 mg tablet, Take 1 tablet (500 mg total) by mouth 4 (four) times a day (Patient not taking: Reported on 1/25/2025), Disp: 30 tablet, Rfl: 0    predniSONE 10 mg tablet, Take 4 tablets (40 mg total) by mouth daily for 5 days, THEN 3 tablets (30 mg total) daily for 5 days, THEN 2 tablets (20 mg total) daily for 5 days, THEN 1 tablet (10 mg total) daily for 5 days. (Patient not taking: Reported on 3/17/2025), Disp: 50 tablet, Rfl: 0    Current Allergies     Allergies as of 03/17/2025 - Reviewed 03/17/2025   Allergen Reaction Noted    Oxycodone-acetaminophen Drowsiness 07/03/2018    Percolone [oxycodone] GI Intolerance 06/01/2017            The following portions of the patient's history were reviewed and updated as appropriate: allergies, current medications, past family history, past medical history, past social history, past surgical history and problem list.     Past Medical History:   Diagnosis Date    Abnormal Pap smear of cervix     Hidradenitis     gali buttocks and axilla - open areas - draining intermittently    History of COVID-19     12/21/21- no hospitalization    HPV in female     Wears glasses        Past Surgical History:   Procedure Laterality Date    AXILLARY HIDRADENITIS EXCISION Left 3/15/2022    Procedure: EXCISION HIDRADENITIS AXILLARY;  Surgeon: Rhonda Shen MD;  Location: [facility];  Service: General    AXILLARY HIDRADENITIS EXCISION Right 9/2/2022    Procedure: EXCISION HIDRADENITIS AXILLARY;  Surgeon: Rhonda Shen MD;  Location: [facility];  Service: General    SKIN BIOPSY      WOUND DEBRIDEMENT Bilateral 2/11/2022    Procedure: EXCISIONAL DEBRIDEMENT of bilateral buttocks;  Surgeon: Rhonda Shen MD;  Location: [facility];  Service: General       Family History   Problem Relation Age of Onset    Endocrine tumor Mother         pituitary    Alcohol abuse Father     ADD / ADHD Father     Asthma Brother     Diabetes Maternal Grandmother     Hypertension Maternal Grandmother     Diabetes Paternal Grandmother          Medications have been verified.        Objective   /88   Pulse 84   Temp 98 °F (36.7 °C)   Resp 18   Wt 88.9 kg (196 lb)   SpO2 98%   BMI 32.62 kg/m²        Physical Exam     Physical Exam  Vitals and nursing note reviewed.   Constitutional:       Appearance: Normal appearance.   Skin:     General: Skin is warm and dry.      Comments: Significant rash throughout the entire body.  Erythematous with a grayish dry plaque on top.  Rash on the lower ankles/shins is slightly more bright red in appearance.  There are some scattered pustules throughout the lower leg.  Round lesions on the sole of the foot that are significantly tender with palpation.   Neurological:      General: No focal deficit present.      Mental Status: She is alert and oriented to person, place, and time.   Psychiatric:         Mood and Affect: Mood normal.         Behavior: Behavior normal.       NEW LESIONS ON THE BOTTOM OF THE FEET.

## 2025-03-19 DIAGNOSIS — L73.2 HIDRADENITIS: ICD-10-CM

## 2025-03-19 DIAGNOSIS — L30.9 DERMATITIS: Primary | ICD-10-CM

## 2025-03-21 ENCOUNTER — TELEPHONE (OUTPATIENT)
Age: 30
End: 2025-03-21

## 2025-03-21 NOTE — TELEPHONE ENCOUNTER
[facility] requested an insurance referral.      Test name: evaluate and treat     DX code: D48.5    Date of service: 3/20/25    NPI: [id number]    [facility]  [address]  [id number]   [address] [phone]      Please fax to: [phone] Attn: Demetria     Thank you.

## 2025-03-31 ENCOUNTER — HOSPITAL ENCOUNTER (EMERGENCY)
Facility: HOSPITAL | Age: 30
Discharge: HOME | End: 2025-03-31
Attending: EMERGENCY MEDICINE
Payer: COMMERCIAL

## 2025-03-31 VITALS
BODY MASS INDEX: 31.77 KG/M2 | SYSTOLIC BLOOD PRESSURE: 135 MMHG | HEART RATE: 98 BPM | DIASTOLIC BLOOD PRESSURE: 69 MMHG | RESPIRATION RATE: 18 BRPM | WEIGHT: 190.92 LBS | TEMPERATURE: 98.6 F | OXYGEN SATURATION: 100 %

## 2025-03-31 DIAGNOSIS — R21 RASH AND NONSPECIFIC SKIN ERUPTION: Primary | ICD-10-CM

## 2025-03-31 LAB
ALBUMIN SERPL BCG-MCNC: 3.5 G/DL (ref 3.5–5)
ALP SERPL-CCNC: 75 U/L (ref 34–104)
ALT SERPL W P-5'-P-CCNC: 9 U/L (ref 7–52)
ANION GAP SERPL CALCULATED.3IONS-SCNC: 5 MMOL/L (ref 4–13)
AST SERPL W P-5'-P-CCNC: 12 U/L (ref 13–39)
BASOPHILS # BLD AUTO: 0.01 THOUSANDS/ÂΜL (ref 0–0.1)
BASOPHILS NFR BLD AUTO: 0 % (ref 0–1)
BILIRUB SERPL-MCNC: 0.54 MG/DL (ref 0.2–1)
BUN SERPL-MCNC: 5 MG/DL (ref 5–25)
CA-I BLD-SCNC: 1.17 MMOL/L (ref 1.12–1.32)
CALCIUM SERPL-MCNC: 9 MG/DL (ref 8.4–10.2)
CHLORIDE SERPL-SCNC: 105 MMOL/L (ref 96–108)
CO2 SERPL-SCNC: 26 MMOL/L (ref 21–32)
CREAT SERPL-MCNC: 0.68 MG/DL (ref 0.6–1.3)
EOSINOPHIL # BLD AUTO: 0.14 THOUSAND/ÂΜL (ref 0–0.61)
EOSINOPHIL NFR BLD AUTO: 1 % (ref 0–6)
ERYTHROCYTE [DISTWIDTH] IN BLOOD BY AUTOMATED COUNT: 16.4 % (ref 11.6–15.1)
GFR SERPL CREATININE-BSD FRML MDRD: 120 ML/MIN/1.73SQ M
GLUCOSE SERPL-MCNC: 107 MG/DL (ref 65–140)
HCT VFR BLD AUTO: 34.8 % (ref 34.8–46.1)
HGB BLD-MCNC: 10.9 G/DL (ref 11.5–15.4)
IMM GRANULOCYTES # BLD AUTO: 0.02 THOUSAND/UL (ref 0–0.2)
IMM GRANULOCYTES NFR BLD AUTO: 0 % (ref 0–2)
LYMPHOCYTES # BLD AUTO: 1.8 THOUSANDS/ÂΜL (ref 0.6–4.47)
LYMPHOCYTES NFR BLD AUTO: 16 % (ref 14–44)
MCH RBC QN AUTO: 25.6 PG (ref 26.8–34.3)
MCHC RBC AUTO-ENTMCNC: 31.3 G/DL (ref 31.4–37.4)
MCV RBC AUTO: 82 FL (ref 82–98)
MONOCYTES # BLD AUTO: 1.2 THOUSAND/ÂΜL (ref 0.17–1.22)
MONOCYTES NFR BLD AUTO: 11 % (ref 4–12)
NEUTROPHILS # BLD AUTO: 8.11 THOUSANDS/ÂΜL (ref 1.85–7.62)
NEUTS SEG NFR BLD AUTO: 72 % (ref 43–75)
NRBC BLD AUTO-RTO: 0 /100 WBCS
PLATELET # BLD AUTO: 404 THOUSANDS/UL (ref 149–390)
PMV BLD AUTO: 9.1 FL (ref 8.9–12.7)
POTASSIUM SERPL-SCNC: 3.4 MMOL/L (ref 3.5–5.3)
PROT SERPL-MCNC: 8.4 G/DL (ref 6.4–8.4)
RBC # BLD AUTO: 4.26 MILLION/UL (ref 3.81–5.12)
SODIUM SERPL-SCNC: 136 MMOL/L (ref 135–147)
WBC # BLD AUTO: 11.28 THOUSAND/UL (ref 4.31–10.16)

## 2025-03-31 PROCEDURE — 99283 EMERGENCY DEPT VISIT LOW MDM: CPT

## 2025-03-31 PROCEDURE — 80053 COMPREHEN METABOLIC PANEL: CPT | Performed by: EMERGENCY MEDICINE

## 2025-03-31 PROCEDURE — 85025 COMPLETE CBC W/AUTO DIFF WBC: CPT | Performed by: EMERGENCY MEDICINE

## 2025-03-31 PROCEDURE — 36415 COLL VENOUS BLD VENIPUNCTURE: CPT | Performed by: EMERGENCY MEDICINE

## 2025-03-31 PROCEDURE — 99284 EMERGENCY DEPT VISIT MOD MDM: CPT | Performed by: EMERGENCY MEDICINE

## 2025-03-31 PROCEDURE — 82330 ASSAY OF CALCIUM: CPT | Performed by: EMERGENCY MEDICINE

## 2025-03-31 RX ORDER — IBUPROFEN 600 MG/1
600 TABLET, FILM COATED ORAL ONCE
Status: COMPLETED | OUTPATIENT
Start: 2025-03-31 | End: 2025-03-31

## 2025-03-31 RX ORDER — CLOBETASOL PROPIONATE 0.5 MG/G
OINTMENT TOPICAL 2 TIMES DAILY
Qty: 60 G | Refills: 1 | Status: SHIPPED | OUTPATIENT
Start: 2025-03-31

## 2025-03-31 RX ORDER — ACETAMINOPHEN 325 MG/1
650 TABLET ORAL ONCE
Status: COMPLETED | OUTPATIENT
Start: 2025-03-31 | End: 2025-03-31

## 2025-03-31 RX ORDER — POTASSIUM CHLORIDE 1500 MG/1
40 TABLET, EXTENDED RELEASE ORAL ONCE
Status: COMPLETED | OUTPATIENT
Start: 2025-03-31 | End: 2025-03-31

## 2025-03-31 RX ADMIN — ACETAMINOPHEN 650 MG: 325 TABLET, FILM COATED ORAL at 17:15

## 2025-03-31 RX ADMIN — IBUPROFEN 600 MG: 600 TABLET ORAL at 17:15

## 2025-03-31 RX ADMIN — POTASSIUM CHLORIDE 40 MEQ: 1500 TABLET, EXTENDED RELEASE ORAL at 18:56

## 2025-03-31 NOTE — Clinical Note
Uzair Jean was seen and treated in our emergency department on 3/31/2025.    No restrictions            Diagnosis:     Uzair  may return to work on return date.    She may return on this date: 04/02/2025         If you have any questions or concerns, please don't hesitate to call.      Aaliyah Villalobos, DO    ______________________________           _______________          _______________  Hospital Representative                              Date                                Time

## 2025-03-31 NOTE — ED PROVIDER NOTES
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                   Assessment & Plan       Medical Decision Making  29-year-old female with past medical history of hidradenitis suppurativa, presents to the ED for diffuse body rash that has been ongoing for approximately 1 and half to 2 months.  Rash started after being initiated on Humira which was supposed to treat the at bedtime however since the rash erupted, she has since stopped the Humira.  She has followed up with dermatology as well as family medicine and urgent care visits.  She has been on steroids multiple times however the rash has been persistent and spreading, getting worse and now painful.  Will consult on-call dermatology for recommendations.  Reassuring signs is that she is not having any fevers or systemic symptoms, not having any oral or mucosal involvement.     Amount and/or Complexity of Data Reviewed  Labs: ordered. Decision-making details documented in ED Course.    Risk  OTC drugs.  Prescription drug management.        ED Course as of 03/31/25 1900   Mon Mar 31, 2025   1650 Messaged on-call dermatologist, Dr. Mehta, requesting review of chart, pictures and to provide recommendations on management.   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  They would like to see her at the [facility] office tomorrow morning and advised that the office will call her but she can show up at 9 AM if she does not hear back from them.  Dermatology recommended we obtain a calcium level given that she has been on a lot of courses of oral steroids.  They do not recommend systemic steroids at this time as they will likely perform a biopsy in the office tomorrow.  They recommended I start clobetasol 0.005% ointment under occlusion twice daily.  Updated patient about these recommendations and she is agreeable with plan.   1825 Platelet Count(!): 404  Similarly mildly elevated 2 and 3 years ago.   1835 Calcium, Ionized: 1.17   1844 Calcium: 9.0   1844 Potassium(!): 3.4  Will replace with PO potassium.    1859 Updated patient about blood work and plan for follow-up with dermatology tomorrow.  Discussed how to use the cream and when to return to the ER.       Medications   potassium chloride (Klor-Con M20) CR tablet 40 mEq (has no administration in time range)   ibuprofen (MOTRIN) tablet 600 mg (600 mg Oral Given 3/31/25 1715)   acetaminophen (TYLENOL) tablet 650 mg (650 mg Oral Given 3/31/25 1715)       ED Risk Strat Scores                            SBIRT 22yo+      Flowsheet Row Most Recent Value   Initial Alcohol Screen: US AUDIT-C     1. How often do you have a drink containing alcohol? 0 Filed at: 03/31/2025 1626   2. How many drinks containing alcohol do you have on a typical day you are drinking?  0 Filed at: 03/31/2025 1626   3b. FEMALE Any Age, or MALE 65+: How often do you have 4 or more drinks on one occassion? 0 Filed at: 03/31/2025 1626   Audit-C Score 0 Filed at: 03/31/2025 1626   PENNY: How many times in the past year have you...    Used an illegal drug or used a prescription medication for non-medical reasons? Never Filed at: 03/31/2025 1626                            History of Present Illness       Chief Complaint   Patient presents with     Rash     Full body rash, rash has been ongoing however today has been worse and is having tingling and pain        Past Medical History:   Diagnosis Date    Abnormal Pap smear of cervix     Hidradenitis     gali buttocks and axilla - open areas - draining intermittently    History of COVID-19     12/21/21- no hospitalization    HPV in female     Wears glasses       Past Surgical History:   Procedure Laterality Date    AXILLARY HIDRADENITIS EXCISION Left 3/15/2022    Procedure: EXCISION HIDRADENITIS AXILLARY;  Surgeon: Rhonda Shen MD;  Location: [facility];  Service: General    AXILLARY HIDRADENITIS EXCISION Right 9/2/2022    Procedure: EXCISION HIDRADENITIS AXILLARY;  Surgeon: Rhonda Shen MD;  Location: [facility];  Service: General    SKIN BIOPSY      WOUND DEBRIDEMENT Bilateral 2/11/2022    Procedure: EXCISIONAL DEBRIDEMENT of bilateral buttocks;  Surgeon: Rhonda Shen MD;  Location: [facility];  Service: General      Family History   Problem Relation Age of Onset    Endocrine tumor Mother         pituitary    Alcohol abuse Father     ADD / ADHD Father     Asthma Brother     Diabetes Maternal Grandmother     Hypertension Maternal Grandmother     Diabetes Paternal Grandmother       Social History     Tobacco Use    Smoking status: Some Days     Types: Cigars    Smokeless tobacco: Never   Vaping Use    Vaping status: Never Used   Substance Use Topics    Alcohol use: Yes     Comment: socially    Drug use: Yes     Frequency: 7.0 times per week     Types: Marijuana      E-Cigarette/Vaping    E-Cigarette Use Never User       E-Cigarette/Vaping Substances    Nicotine No     THC No     CBD No     Flavoring No     Other No     Unknown No       I have reviewed and agree with the history as documented.     Patient is a 29-year-old female with past medical history of hidradenitis suppurativa, presents to the emergency department for painful diffuse body rash.  Patient states the rash has been present for  approximately 2 months and she has followed with both urgent care, PCP as well as with dermatology.  She states that the rash started a few weeks after starting Humira which was prescribed for her hidradenitis.  She reports it started in late January on her abdomen, bilateral arms and was mostly itchy, red raised rash initially.  She was initially seen at urgent care and prescribed a course of prednisone, mometasone cream as well as hydroxyzine.  She returned to urgent care in early February and was again diagnosed with dermatitis.  She was then prescribed triamcinolone cream.  She had then called her dermatologist who thought she might be getting a psoriasis like rash from the Humira so the Humira was stopped and now she gets infliximab infusions every 6 weeks.  She reports she has seen multiple dermatologists and has been on multiple courses of steroids however the rash has been getting diffusely worse especially on her bilateral feet and legs.  She states it is now burning and painful.  She has an appointment with a new dermatologist this upcoming Thursday 4/3.  She denies any fevers, chills or other systemic symptoms.  Denies any mucosal or oral involvement.      History provided by:  Patient and medical records   used: No    Rash  Associated symptoms: no abdominal pain, no diarrhea, no fever, no headaches, no nausea, no shortness of breath, no sore throat, not vomiting and not wheezing        Review of Systems   Constitutional:  Negative for chills and fever.   HENT:  Negative for congestion, ear pain, mouth sores, rhinorrhea and sore throat.    Respiratory:  Negative for cough, chest tightness, shortness of breath and wheezing.    Cardiovascular:  Negative for chest pain and palpitations.   Gastrointestinal:  Negative for abdominal pain, constipation, diarrhea, nausea and vomiting.   Genitourinary:  Negative for dysuria, flank pain, frequency and hematuria.   Musculoskeletal:  Negative for  back pain and neck pain.   Skin:  Positive for rash. Negative for color change and pallor.   Allergic/Immunologic: Positive for immunocompromised state.   Neurological:  Negative for dizziness, weakness, light-headedness, numbness and headaches.   Hematological:  Negative for adenopathy.   Psychiatric/Behavioral:  Negative for confusion and decreased concentration.    All other systems reviewed and are negative.          Objective       ED Triage Vitals   Temperature Pulse Blood Pressure Respirations SpO2 Patient Position - Orthostatic VS   03/31/25 1626 03/31/25 1626 03/31/25 1626 03/31/25 1626 03/31/25 1626 03/31/25 1626   98.6 °F (37 °C) 98 135/69 18 100 % Sitting      Temp Source Heart Rate Source BP Location FiO2 (%) Pain Score    03/31/25 1626 03/31/25 1626 03/31/25 1626 -- 03/31/25 1715    Oral Monitor Left arm  7      Vitals      Date and Time Temp Pulse SpO2 Resp BP Pain Score FACES Pain Rating User   03/31/25 1715 -- -- -- -- -- 7 -- VMW   03/31/25 1626 98.6 °F (37 °C) 98 100 % 18 135/69 -- -- FB          Vitals:    03/31/25 1626   BP: 135/69   BP Location: Left arm   Pulse: 98   Resp: 18   Temp: 98.6 °F (37 °C)   TempSrc: Oral   SpO2: 100%   Weight: 86.6 kg (190 lb 14.7 oz)        Physical Exam  Vitals and nursing note reviewed.   Constitutional:       General: She is not in acute distress.     Appearance: Normal appearance. She is well-developed. She is not ill-appearing, toxic-appearing or diaphoretic.   HENT:      Head: Normocephalic and atraumatic.      Right Ear: External ear normal.      Left Ear: External ear normal.      Mouth/Throat:      Mouth: Mucous membranes are moist.      Pharynx: Oropharynx is clear. No oropharyngeal exudate.   Eyes:      Extraocular Movements: Extraocular movements intact.      Conjunctiva/sclera: Conjunctivae normal.      Pupils: Pupils are equal, round, and reactive to light.   Neck:      Vascular: No JVD.   Cardiovascular:      Rate and Rhythm: Normal rate and regular  rhythm.      Pulses: Normal pulses.      Heart sounds: Normal heart sounds. No murmur heard.     No friction rub. No gallop.   Pulmonary:      Effort: Pulmonary effort is normal. No respiratory distress.      Breath sounds: Normal breath sounds. No wheezing, rhonchi or rales.   Abdominal:      General: There is no distension.      Palpations: Abdomen is soft.      Tenderness: There is no abdominal tenderness. There is no guarding or rebound.   Musculoskeletal:         General: No tenderness or deformity. Normal range of motion.      Cervical back: Normal range of motion and neck supple. No rigidity.   Skin:     General: Skin is warm and dry.      Coloration: Skin is not pale.      Findings: Rash present. No erythema.      Comments: Plaque-like erythematous scaly rash on bilateral feet, lower legs, bilateral wrists and arms, anterior and posterior trunk.  See pictures below.   Neurological:      General: No focal deficit present.      Mental Status: She is alert and oriented to person, place, and time.      Sensory: No sensory deficit.      Motor: No weakness.   Psychiatric:         Mood and Affect: Mood normal.         Behavior: Behavior normal.                     Results Reviewed       Procedure Component Value Units Date/Time    Comprehensive metabolic panel [[id number]]  (Abnormal) Collected: 03/31/25 1818    Lab Status: Final result Specimen: Blood from Arm, Left Updated: 03/31/25 1839     Sodium 136 mmol/L      Potassium 3.4 mmol/L      Chloride 105 mmol/L      CO2 26 mmol/L      ANION GAP 5 mmol/L      BUN 5 mg/dL      Creatinine 0.68 mg/dL      Glucose 107 mg/dL      Calcium 9.0 mg/dL      AST 12 U/L      ALT 9 U/L      Alkaline Phosphatase 75 U/L      Total Protein 8.4 g/dL      Albumin 3.5 g/dL      Total Bilirubin 0.54 mg/dL      eGFR 120 ml/min/1.73sq m     Narrative:      [employer] guidelines for Chronic Kidney Disease (CKD):     Stage 1 with normal or high GFR (GFR > 90  mL/min/1.73 square meters)    Stage 2 Mild CKD (GFR = 60-89 mL/min/1.73 square meters)    Stage 3A Moderate CKD (GFR = 45-59 mL/min/1.73 square meters)    Stage 3B Moderate CKD (GFR = 30-44 mL/min/1.73 square meters)    Stage 4 Severe CKD (GFR = 15-29 mL/min/1.73 square meters)    Stage 5 End Stage CKD (GFR <15 mL/min/1.73 square meters)  Note: GFR calculation is accurate only with a steady state creatinine    Calcium, ionized [[id number]]  (Normal) Collected: 03/31/25 1818    Lab Status: Final result Specimen: Blood from Arm, Left Updated: 03/31/25 1831     Calcium, Ionized 1.17 mmol/L     CBC and differential [[id number]]  (Abnormal) Collected: 03/31/25 1818    Lab Status: Final result Specimen: Blood from Arm, Left Updated: 03/31/25 1822     WBC 11.28 Thousand/uL      RBC 4.26 Million/uL      Hemoglobin 10.9 g/dL      Hematocrit 34.8 %      MCV 82 fL      MCH 25.6 pg      MCHC 31.3 g/dL      RDW 16.4 %      MPV 9.1 fL      Platelets 404 Thousands/uL      nRBC 0 /100 WBCs      Segmented % 72 %      Immature Grans % 0 %      Lymphocytes % 16 %      Monocytes % 11 %      Eosinophils Relative 1 %      Basophils Relative 0 %      Absolute Neutrophils 8.11 Thousands/µL      Absolute Immature Grans 0.02 Thousand/uL      Absolute Lymphocytes 1.80 Thousands/µL      Absolute Monocytes 1.20 Thousand/µL      Eosinophils Absolute 0.14 Thousand/µL      Basophils Absolute 0.01 Thousands/µL             No orders to display       Procedures    ED Medication and Procedure Management   Prior to Admission Medications   Prescriptions Last Dose Informant Patient Reported? Taking?   famotidine (PEPCID) 20 mg tablet   No No   Sig: TAKE 1 TABLET BY MOUTH TWICE A DAY   hydrOXYzine HCL (ATARAX) 25 mg tablet  Self No No   Sig: Take 1 tablet (25 mg total) by mouth every 6 (six) hours as needed for itching   loratadine (CLARITIN) 10 mg tablet   No No   Sig: TAKE 1 TABLET BY MOUTH EVERY DAY   methocarbamol (ROBAXIN) 500 mg tablet  Self No No    Sig: Take 1 tablet (500 mg total) by mouth 4 (four) times a day   Patient not taking: Reported on 1/25/2025   triamcinolone (KENALOG) 0.1 % cream  Self No No   Sig: Apply topically 2 (two) times a day      Facility-Administered Medications: None     Patient's Medications   Discharge Prescriptions    CLOBETASOL (TEMOVATE) 0.05 % OINTMENT    Apply topically 2 (two) times a day . After you apply the ointment, wrap the areas in gauze in order to ensure your skin absorbs the medication.       Start Date: 3/31/2025 End Date: --       Order Dose: --       Quantity: 60 g    Refills: 1     No discharge procedures on file.  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

## 2025-03-31 NOTE — QUICK NOTE
DERMATOLOGY:  QUICK NOTE   Uzair Jean 29 y.o. female MRN: [id number]  Unit/Bed#: FTH10 Encounter: [id number]        DERM QUICK NOTE - Attending and resident present VIRTUALLY. Patient photos reviewed. Patient in hospital setting.  Assessment can only be provided based on clinical images received and symptoms conveyed.       Assessment/Recommendations     Patient is a 29 year old female known to dermatology with PMH of HS previously treated with Humira now on infliximab due to concern for drug reaction now presenting to ED with continued rash now present for 2 months and not improving despite multiple steroid courses. Suspect rash is 2/2 pustular psoriasis possibly triggered by infliximab and worsening after steroid courses.     Recommendations:  Start clobetasol 0.05% ointment BID under occlusion to affected areas.  Patient should be seen in outpatient dermatology office tomorrow for evaluation and possible biopsy.  Would not recommend additional steroids at this time given patient will likely need biopsy and she has had multiple courses recently.  Would obtain calcium level while in the ED given recent steroid use.       Please set up discharge follow up by calling our office: [phone] ([phone])     Thank you for involving me in the care of your patient. Please call with questions, change in clinical status or if tests recommended above are abnormal.     Discussed with the primary service.    Sunitha Mehta MD  Dermatology PGY-2

## 2025-04-01 ENCOUNTER — OFFICE VISIT (OUTPATIENT)
Dept: DERMATOLOGY | Facility: CLINIC | Age: 30
End: 2025-04-01

## 2025-04-01 ENCOUNTER — TELEPHONE (OUTPATIENT)
Dept: DERMATOLOGY | Facility: CLINIC | Age: 30
End: 2025-04-01

## 2025-04-01 VITALS — BODY MASS INDEX: 31.88 KG/M2 | WEIGHT: 191.6 LBS

## 2025-04-01 DIAGNOSIS — L73.2 HIDRADENITIS: ICD-10-CM

## 2025-04-01 DIAGNOSIS — R21 RASH: Primary | ICD-10-CM

## 2025-04-01 RX ORDER — CLOBETASOL PROPIONATE 0.5 MG/ML
SOLUTION TOPICAL 2 TIMES DAILY
Qty: 50 ML | Refills: 2 | Status: SHIPPED | OUTPATIENT
Start: 2025-04-01

## 2025-04-01 NOTE — PROGRESS NOTES
"[facility] Note     Patient Name: Uzair Jean  Encounter Date: 4/1/2025     Have you been cared for by a [facility] D[facility]atologist in the last 3 years and, if so, which description applies to you?    Yes.  I have been here within the last 3 years, and my medical history has NOT changed since that time.  I am FEMALE/of child-bearing potential.    REVIEW OF SYSTEMS:  Have you recently had or currently have any of the following? No changes in my recent health.   PAST MEDICAL HISTORY:  Have you personally ever had or currently have any of the following?  If \"YES,\" then please provide more detail. No changes in my medical history.   HISTORY OF IMMUNOSUPPRESSION: Do you have a history of any of the following:  Systemic Immunosuppression such as Diabetes, Biologic or Immunotherapy, Chemotherapy, Organ Transplantation, Bone Marrow Transplantation or Prednisone?  No     Answering \"YES\" requires the addition of the dotphrase \"IMMUNOSUPPRESSED\" as the first diagnosis of the patient's visit.   FAMILY HISTORY:  Any \"first degree relatives\" (parent, brother, sister, or child) with the following?    No changes in my family's known health.   PATIENT EXPERIENCE:    Do you want the Dermatologist to perform a COMPLETE skin exam today including a clinical examination under the \"bra and underwear\" areas?  NO  If necessary, do we have your permission to call and leave a detailed message on your Preferred Phone number that includes your specific medical information?  Yes      Allergies   Allergen Reactions    Oxycodone-Acetaminophen Drowsiness    Percolone [Oxycodone] GI Intolerance      Current Outpatient Medications:     clobetasol (TEMOVATE) 0.05 % external solution, Apply topically 2 (two) times a day For two weeks, Disp: 50 mL, Rfl: 2    clobetasol (TEMOVATE) 0.05 % ointment, Apply topically 2 (two) times a day . After you apply the ointment, wrap the areas in gauze in order to ensure your skin absorbs the " medication., Disp: 60 g, Rfl: 1    famotidine (PEPCID) 20 mg tablet, TAKE 1 TABLET BY MOUTH TWICE A DAY, Disp: 180 tablet, Rfl: 1    hydrOXYzine HCL (ATARAX) 25 mg tablet, Take 1 tablet (25 mg total) by mouth every 6 (six) hours as needed for itching, Disp: 30 tablet, Rfl: 0    loratadine (CLARITIN) 10 mg tablet, TAKE 1 TABLET BY MOUTH EVERY DAY, Disp: 90 tablet, Rfl: 1    triamcinolone (KENALOG) 0.1 % cream, Apply topically 2 (two) times a day, Disp: 454 g, Rfl: 0    methocarbamol (ROBAXIN) 500 mg tablet, Take 1 tablet (500 mg total) by mouth 4 (four) times a day (Patient not taking: Reported on 1/25/2025), Disp: 30 tablet, Rfl: 0  No current facility-administered medications for this visit.          Whom besides the patient is providing clinical information about today's encounter?   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 Diffuse thick scaling plaques on scalp extending onto face and neck; Differential Diagnosis: Rule out Pustular psoriasis vs connective tissue disease vs other    Pertinent Positives:  Pertinent Negatives:    Additional History of Present Condition:  Patient is a 29 year old female known to dermatology with PMH of HS previously treated with Humira now on infliximab due to concern for drug reaction now presenting to ED with continued rash now present for 2 months and not improving despite multiple steroid courses. Suspect rash is 2/2 pustular psoriasis possibly triggered by infliximab and worsening after steroid courses.     Plan:  Punch Biopsy performed today- We will call with results  Apply Clobetasol 0.05% scalp solution twice  daily for two weeks taking one week break between use.   Apply Clobetasol 0.05% ointment twice daily for two weeks taking one week break between use. DO NOT APPLY TO GROIND, ARMPITS  Hold infliximab/TNF alpha blockers for now  Given severity, may need to consider systemic therapy such as cyclosporine or psoriasis systemic medication other than TNF such as Cosentyx to treat both HS and possible psoriasis     PROCEDURES PERFORMED TODAY ASSOCIATED WITH THIS CONDITION:          PUNCH BIOPSY  PROCEDURE NOTE:  PUNCH BIOPSY      Performing Physician: / Dr. Mehta    Anatomic Location; Clinical Description with size (cm); Pre-Op Diagnosis:    Specimen A:Left Lateral Shin;Skin;4 mm Punch Biopsy;29 year old female with a erythematous plaques with pustules and scale; Differential Diagnosis: Rule out Pustular psoriasis.       Anesthesia: 1% xylocaine with epi       Topical anesthesia: None       Indications: To indicate diagnosis and management plan.    Procedure Details     Patient informed of the risks (including bleeding,scaring and infection) and benefits of the procedure explained. Verbal and written informed consent obtained. The area was prepped and draped in the usual fashion. Anesthesia was obtained with 1% lidocaine with epinephrine. The skin was then stretched perpendicular to the skin tension lines and a punch biopsy to an appropriate sampling depth was obtained with a 4 mm punch with a forceps and iris scissors.     Hemostasis was obtained with 4-0 Prolene x 2 sutures.     Complications:  None      Specimen has been sent for review by Dermatopathology.      Plan:  1. Instructed to keep the wound dry and covered for 24-48h and clean thereafter.  2. Warning signs of infection were reviewed.    3. Recommended that the patient use acetaminophen as needed for pain  4. Sutures if any should be removed in 10-12 days      Standard post-procedure care has been explained and has been included in written form within  the patient's copy of Informed Consent.       Medical Complexity:    UNDIAGNOSED NEW PROBLEM WITH UNCERTAIN DIAGNOSIS.  A condition included in the differential diagnosis represents a high risk of morbidity without treatment.             HIDRADENITIS SUPPURATIVA    Physical Exam:  Anatomic Location Affected:  Buttock   Morphological Description:  nodules and scarring  Pertinent Positives:  Pertinent Negatives:    Additional History of Present Condition:  Patient has a history of HS. Presenting today for a rash. Patient states receives INFLIXIMAB infusion for HS. Has responded well to steroid injections in the past for acute flares.    Assessment and Plan:  Based on a thorough discussion of this condition and the management approach to it (including a comprehensive discussion of the known risks, side effects and potential benefits of treatment), the patient (family) agrees to implement the following specific plan:  Kenalog 10 injections performed today- consent signed and scanned into chart   Once biopsy results return, will need to consider alternative therapy for her HS if TNF induced psoriasis- would probably prefer Cosentyx      PROCEDURE:  INTRALESIONAL STEROID INJECTION (KENALOG INJECTION)    Purpose: Triamcinolone is a synthetic glucocorticoid corticosteroid that has marked anti-inflammatory action. It is prepared in sterile aqueous suspension suitable for injecting directly into a lesion on or immediately below the skin to treat a dermal inflammatory process.     Indications: It is indicated for alopecia areata; inflammatory acne cysts; discoid lupus erythematosus; keloids and hypertrophic scars; inflammatory lesions of granuloma annulare, lichen planus, lichen simplex chronicus (neurodermatitis), psoriatic plaques, and other localized inflammatory skin conditions.     Potential Side Effects: I understand that triamcinolone injection can potentially cause early and/or delayed adverse effects such as:    Pain     Impaired wound healing    Increased hair growth    Bleeding    White or brown marks    Steroid acne    Infection    Telangiectasia    Skin thinning    Cutaneous and subcutaneous lipoatrophy (most common) appearing as skin indentations or dimples around the injection sites a few weeks after treatment     PROCEDURE NOTE:  After verbal and written consent were obtained, the to-be-treated area was wiped and cleaned with rubbing alcohol 70%.      Then, a total of 0.3 mL of Kenalog CONCENTRATION:  10 mg/mL   (Lot# [id number]; Expiration [id number], NDC#: 0747-1213-10) was injected intralesionally into a total of 1 lesion/s on the following anatomic areas:  BUTTOCK  using a 1-mL syringe and a 30-gauge needle.      There was less than 1 mL of blood loss and little to no discomfort.  The area was bandaged with a Band-aid.  The patient tolerated the procedure well and remained in the office for observation.  With no signs of an adverse reaction, the patient was eventually discharged from clinic.       What is hidradenitis suppurativa?  Hidradenitis suppurativa is an inflammatory skin disease that affects apocrine gland-bearing skin in the axillae, in the groin, and under the breasts. It is characterised by recurrent boil-like nodules and abscesses that culminate in pus-like discharge, difficult-to-heal open wounds (sinuses) and scarring. Hidradenitis suppurativa also has significant psychological impact and many patients suffer from impairment of body image, depression and anxiety.     The term hidradenitis implies it starts as an inflammatory disorder of sweat glands, which is now known to be incorrect. Hidradenitis suppurativa is also known as acne inversa.    Who gets hidradenitis suppurativa?  Hidradenitis often starts at puberty, and is most active between the ages of 20 and 40 years, and in women, can resolve at menopause. It is three times more common in females than in males. Risk factors include:  Other family members  with hidradenitis suppurativa  Obesity and insulin resistance/metabolic syndrome  Cigarette smoking  Follicular occlusion disorders: acne conglobata, dissecting cellulitis, pilonidal sinus  Inflammatory bowel disease (Crohn disease)  Rare autoinflammatory syndromes associated with abnormalities of PSTPIP1 gene.*    * PAPA syndrome (pyogenic arthritis, pyoderma gangrenosum and acne), PASH syndrome (pyoderma gangrenosum, acne, suppurative hidradenitis) and PAPASH syndrome (pyogenic arthritis, pyoderma gangrenosum, acne, suppurative hidradenitis).    What causes hidradenitis suppurativa?  Hidradenitis suppurativa is an autoinflammatory disorder. Although the exact cause is not yet understood, contributing factors include:  Friction from clothing and body folds  Aberrant immune response to commensal bacteria  Abnormal cutaneous or follicular microbiome  Follicular occlusion  Release of pro-inflammatory cytokines  Inflammation causing rupture of the follicular wall and destroying apocrine glands and ducts  Secondary bacterial infection  Certain drugs.    What are the clinical features of hidradenitis suppurativa?  Hidradenitis can affect a single or multiple areas in the armpits, neck, sub mammary area, and inner thighs. Anogenital involvement most commonly affects the groin, mons pubis, vulva (in females), sides of the scrotum (in males), perineum, buttocks and perianal folds.  Signs include:  Open and closed comedones  Painful firm papules, larger nodules and pleated ridges  Pustules, fluctuant pseudocysts and abscesses  Pyogenic granulomas  Draining sinuses linking inflammatory lesions  Hypertrophic and atrophic scars.    Many patients with hidradenitis suppurativa also suffer from other skin disorders, including acne, hirsutism and psoriasis.    The severity and extent of hidradenitis suppurativa is recorded at assessment and when determining the impact of a treatment. The Stauffer system describes three distinct  clinical stages:  Solitary or multiple, isolated abscess formation without scarring or sinus tracts  Recurrent abscesses, single or multiple widely  lesions, with sinus tract formation  Diffuse or broad involvement, with multiple interconnected sinus tracts and abscesses.    Severe hidradenitis (Stauffer Stage 3) has been associated with:  Male sex  Axillary and perianal involvement  Obesity  Smoking  Higher risk of stroke, coronary artery disease, heart failure, and peripheral artery disease  Disease duration.    What is the treatment for hidradenitis suppurativa?  General measures  Weight loss; follow an anti-inflammatory, low-sugar, low-grain, low-dairy diet (mainly plants)  Smoking cessation: this can lead to improvement within a few months  Loose fitting clothing  Daily unfragranced antiperspirants  If prone to secondary infection, wash with antiseptics or take bleach baths  Apply hydrogen peroxide solution or medical grade honey to reduce malodour  Use peeling agents such as resorcinol 15% cream to de-roof nodules  Apply simple dressings to draining sinuses  Analgesics, such as paracetamol (acetaminophen), for pain control  Seek help to manage anxiety and depression.    Medical management of hidradenitis suppurativa  Medical management of hidradenitis suppurativa is difficult. Treatment is required long term. Effective options are listed below.    Antibiotics  Topical clindamycin, with benzoyl peroxide to reduce bacterial resistance  Short course of oral antibiotics for acute staphylococcal abscesses, eg flucloxacillin  Prolonged courses (minimum 3 months) of tetracycline, metronidazole, trimethoprim + sulphamethoxazole, fluoroquinolones, ertapenem or dapsone for their anti-inflammatory action  6-12 week courses of the combination of clindamycin (or doxycycline) and rifampicin for severe disease.    Antiandrogens  Long-term oral contraceptive pill; antiandrogenic progesterones drospirenone or  cyproterone acetate may be more effective than standard combined pills. These are more suitable than progesterone-only pills or devices.  Spironolactone and finasteride  Response takes 6 months or longer.    Immunomodulatory treatments for severe disease  Intralesional corticosteroids into nodules  Systemic corticosteroids short-term for flares  Methotrexate, ciclosporin, and azathioprine  TNF-? inhibitors adalimumab and infliximab, used in higher dose than required for psoriasis, are the most successful treatments to date. Note that paradoxically, they may sometimes induce new-onset hidradenitis suppurativa  Other biologics are under investigation, such as the IL-1? antagonist, canakinumab    Other medical treatments  Metformin in patients with insulin resistance  Acitretin (unsuitable for females of childbearing potential)  Isotretinoin -- effective for acne but appears unhelpful for most cases of hidradenitis  Colchicine  Medical management of anxiety and depression    Surgical management of hidradenitis suppurativa  Incision and drainage of acute abscesses  Curettage and deroofing of nodules, abscesses and sinuses  Laser ablation of nodules, abscesses and sinuses  Wide local excision of persistent nodules  Radical excisional surgery of entire affected area  Nd:YAG laser hair removal     Sunitha Mehta MD  Dermatology, PGY-2

## 2025-04-01 NOTE — TELEPHONE ENCOUNTER
----- Message from Sunitha Mehta MD sent at 3/31/2025  5:54 PM EDT -----  Regarding: Urgent Follow up in [facility] for 4/1 for biopsy of possible pustular psoriasis  Hello,    Can we please get this patient into [facility] clinic tomorrow, 4/1, for hosptial follow and biopsy of possible pustular psoriasis? OK to overbook!    Thanks,  Sunitha Mehta MD  Dermatology, PGY-2

## 2025-04-01 NOTE — LETTER
April 1, 2025     Patient: Uzair Jean  YOB: 1995  Date of Visit: 4/1/2025      To Whom it May Concern:    Uzair Jean is under my professional care. Uzair was seen in my office on 4/1/2025. Uzair may return to work on 4/2/2025 .    If you have any questions or concerns, please don't hesitate to call.         Sincerely,          DERM NURSE CANDIDO WARE        CC: No Recipients

## 2025-04-01 NOTE — PATIENT INSTRUCTIONS
RASH    Plan:  Punch Biopsy performed today- We will call with results  Apply Clobetasol 0.05% scalp solution twice daily for two weeks taking one week break between use.   Apply Clobetasol 0.05% ointment twice daily for two weeks taking one week break between use. DO NOT APPLY TO GROIND, ARMPITS     PROCEDURES PERFORMED TODAY ASSOCIATED WITH THIS CONDITION:          PUNCH BIOPSY  PROCEDURE NOTE:  PUNCH BIOPSY        Specimen has been sent for review by Dermatopathology.      Plan:  1. Instructed to keep the wound dry and covered for 24-48h and clean thereafter.  2. Warning signs of infection were reviewed.    3. Recommended that the patient use acetaminophen as needed for pain  4. Sutures if any should be removed in 10-12 days      Standard post-procedure care has been explained and has been included in written form within the patient's copy of Informed Consent.                HIDRADENITIS SUPPURATIVA        Assessment and Plan:  Based on a thorough discussion of this condition and the management approach to it (including a comprehensive discussion of the known risks, side effects and potential benefits of treatment), the patient (family) agrees to implement the following specific plan:  Kenalog 10 injections performed today- consent signed and scanned into chart       PROCEDURE:  INTRALESIONAL STEROID INJECTION (KENALOG INJECTION)    Purpose: Triamcinolone is a synthetic glucocorticoid corticosteroid that has marked anti-inflammatory action. It is prepared in sterile aqueous suspension suitable for injecting directly into a lesion on or immediately below the skin to treat a dermal inflammatory process.     Indications: It is indicated for alopecia areata; inflammatory acne cysts; discoid lupus erythematosus; keloids and hypertrophic scars; inflammatory lesions of granuloma annulare, lichen planus, lichen simplex chronicus (neurodermatitis), psoriatic plaques, and other localized inflammatory skin conditions.      Potential Side Effects: I understand that triamcinolone injection can potentially cause early and/or delayed adverse effects such as:    Pain    Impaired wound healing    Increased hair growth    Bleeding    White or brown marks    Steroid acne    Infection    Telangiectasia    Skin thinning    Cutaneous and subcutaneous lipoatrophy (most common) appearing as skin indentations or dimples around the injection sites a few weeks after treatment       What is hidradenitis suppurativa?  Hidradenitis suppurativa is an inflammatory skin disease that affects apocrine gland-bearing skin in the axillae, in the groin, and under the breasts. It is characterised by recurrent boil-like nodules and abscesses that culminate in pus-like discharge, difficult-to-heal open wounds (sinuses) and scarring. Hidradenitis suppurativa also has significant psychological impact and many patients suffer from impairment of body image, depression and anxiety.     The term hidradenitis implies it starts as an inflammatory disorder of sweat glands, which is now known to be incorrect. Hidradenitis suppurativa is also known as acne inversa.    Who gets hidradenitis suppurativa?  Hidradenitis often starts at puberty, and is most active between the ages of 20 and 40 years, and in women, can resolve at menopause. It is three times more common in females than in males. Risk factors include:  Other family members with hidradenitis suppurativa  Obesity and insulin resistance/metabolic syndrome  Cigarette smoking  Follicular occlusion disorders: acne conglobata, dissecting cellulitis, pilonidal sinus  Inflammatory bowel disease (Crohn disease)  Rare autoinflammatory syndromes associated with abnormalities of PSTPIP1 gene.*    * PAPA syndrome (pyogenic arthritis, pyoderma gangrenosum and acne), PASH syndrome (pyoderma gangrenosum, acne, suppurative hidradenitis) and PAPASH syndrome (pyogenic arthritis, pyoderma gangrenosum, acne, suppurative  hidradenitis).    What causes hidradenitis suppurativa?  Hidradenitis suppurativa is an autoinflammatory disorder. Although the exact cause is not yet understood, contributing factors include:  Friction from clothing and body folds  Aberrant immune response to commensal bacteria  Abnormal cutaneous or follicular microbiome  Follicular occlusion  Release of pro-inflammatory cytokines  Inflammation causing rupture of the follicular wall and destroying apocrine glands and ducts  Secondary bacterial infection  Certain drugs.    What are the clinical features of hidradenitis suppurativa?  Hidradenitis can affect a single or multiple areas in the armpits, neck, sub mammary area, and inner thighs. Anogenital involvement most commonly affects the groin, mons pubis, vulva (in females), sides of the scrotum (in males), perineum, buttocks and perianal folds.  Signs include:  Open and closed comedones  Painful firm papules, larger nodules and pleated ridges  Pustules, fluctuant pseudocysts and abscesses  Pyogenic granulomas  Draining sinuses linking inflammatory lesions  Hypertrophic and atrophic scars.    Many patients with hidradenitis suppurativa also suffer from other skin disorders, including acne, hirsutism and psoriasis.    The severity and extent of hidradenitis suppurativa is recorded at assessment and when determining the impact of a treatment. The Stauffer system describes three distinct clinical stages:  Solitary or multiple, isolated abscess formation without scarring or sinus tracts  Recurrent abscesses, single or multiple widely  lesions, with sinus tract formation  Diffuse or broad involvement, with multiple interconnected sinus tracts and abscesses.    Severe hidradenitis (Stauffer Stage 3) has been associated with:  Male sex  Axillary and perianal involvement  Obesity  Smoking  Higher risk of stroke, coronary artery disease, heart failure, and peripheral artery disease  Disease duration.    What is the  treatment for hidradenitis suppurativa?  General measures  Weight loss; follow an anti-inflammatory, low-sugar, low-grain, low-dairy diet (mainly plants)  Smoking cessation: this can lead to improvement within a few months  Loose fitting clothing  Daily unfragranced antiperspirants  If prone to secondary infection, wash with antiseptics or take bleach baths  Apply hydrogen peroxide solution or medical grade honey to reduce malodour  Use peeling agents such as resorcinol 15% cream to de-roof nodules  Apply simple dressings to draining sinuses  Analgesics, such as paracetamol (acetaminophen), for pain control  Seek help to manage anxiety and depression.    Medical management of hidradenitis suppurativa  Medical management of hidradenitis suppurativa is difficult. Treatment is required long term. Effective options are listed below.    Antibiotics  Topical clindamycin, with benzoyl peroxide to reduce bacterial resistance  Short course of oral antibiotics for acute staphylococcal abscesses, eg flucloxacillin  Prolonged courses (minimum 3 months) of tetracycline, metronidazole, trimethoprim + sulphamethoxazole, fluoroquinolones, ertapenem or dapsone for their anti-inflammatory action  6-12 week courses of the combination of clindamycin (or doxycycline) and rifampicin for severe disease.    Antiandrogens  Long-term oral contraceptive pill; antiandrogenic progesterones drospirenone or cyproterone acetate may be more effective than standard combined pills. These are more suitable than progesterone-only pills or devices.  Spironolactone and finasteride  Response takes 6 months or longer.    Immunomodulatory treatments for severe disease  Intralesional corticosteroids into nodules  Systemic corticosteroids short-term for flares  Methotrexate, ciclosporin, and azathioprine  TNF-? inhibitors adalimumab and infliximab, used in higher dose than required for psoriasis, are the most successful treatments to date. Note that  paradoxically, they may sometimes induce new-onset hidradenitis suppurativa  Other biologics are under investigation, such as the IL-1? antagonist, canakinumab    Other medical treatments  Metformin in patients with insulin resistance  Acitretin (unsuitable for females of childbearing potential)  Isotretinoin -- effective for acne but appears unhelpful for most cases of hidradenitis  Colchicine  Medical management of anxiety and depression    Surgical management of hidradenitis suppurativa  Incision and drainage of acute abscesses  Curettage and deroofing of nodules, abscesses and sinuses  Laser ablation of nodules, abscesses and sinuses  Wide local excision of persistent nodules  Radical excisional surgery of entire affected area  Nd:YAG laser hair removal

## 2025-04-09 ENCOUNTER — HOSPITAL ENCOUNTER (OUTPATIENT)
Dept: INFUSION CENTER | Facility: CLINIC | Age: 30
Discharge: HOME | End: 2025-04-09

## 2025-04-14 ENCOUNTER — TELEPHONE (OUTPATIENT)
Age: 30
End: 2025-04-14

## 2025-04-14 PROCEDURE — 88313 SPECIAL STAINS GROUP 2: CPT | Performed by: DERMATOLOGY

## 2025-04-14 PROCEDURE — 88305 TISSUE EXAM BY PATHOLOGIST: CPT | Performed by: DERMATOLOGY

## 2025-04-14 NOTE — TELEPHONE ENCOUNTER
Patient requesting update on path results and would like refill on clobetasol 0.05% ointment. Last evaluated on 4/1/25 with next follow up scheduled on 6/11/25

## 2025-04-14 NOTE — TELEPHONE ENCOUNTER
Patient called asking if her pathology results were back yet advised patient they just came back today and our physicians have not read them yet once they have they will give her a call to discuss. Patient also asking if she could get a refill on the clobetasol 0.05% ointment.

## 2025-04-15 ENCOUNTER — RESULTS FOLLOW-UP (OUTPATIENT)
Dept: DERMATOLOGY | Facility: CLINIC | Age: 30
End: 2025-04-15

## 2025-04-15 DIAGNOSIS — R21 RASH AND NONSPECIFIC SKIN ERUPTION: ICD-10-CM

## 2025-04-15 DIAGNOSIS — L73.2 HIDRADENITIS SUPPURATIVA: Primary | ICD-10-CM

## 2025-04-15 DIAGNOSIS — R21 RASH: ICD-10-CM

## 2025-04-15 RX ORDER — SECUKINUMAB 150 MG/ML
INJECTION SUBCUTANEOUS
Qty: 10 ML | Refills: 0 | Status: SHIPPED | OUTPATIENT
Start: 2025-04-15 | End: 2025-04-18

## 2025-04-15 RX ORDER — SECUKINUMAB 150 MG/ML
INJECTION SUBCUTANEOUS
Qty: 2 ML | Refills: 11 | Status: SHIPPED | OUTPATIENT
Start: 2025-04-15 | End: 2025-04-18

## 2025-04-15 NOTE — RESULT ENCOUNTER NOTE
DERMATOPATHOLOGY RESULT NOTE    Results reviewed by ordering physician.  Called patient to personally discuss results. Discussed results with patient. 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 Discussed switching her to Cosentyx for HS with dosing 300 mg administered by subcutaneous injection at Weeks 0, 1, 2, 3 and 4 and every 4 weeks thereafter. If she does not adequately respond, can consider increasing the dosage to 300 mg every 2 weeks. Discussed risks including injection/allergic reaction and infection risk and need to avoid live vaccines. Patient does not have history of inflammatory Bowel Disease. Patient was in agreement with plan. She will continue to use clobetasol ointment and solution for management of pustular psoriasis. Explained to patient to discontinue infliximab infusions.     Instructions for Clinical Derm Team:   (remember to route Result Note to appropriate staff):    None    Result & Plan by Specimen:    Specimen A: pustular psoriasis  Plan: start PA for Cosentyx 300 mg administered by subcutaneous injection at Weeks 0, 1, 2, 3 and 4 and every 4 weeks thereafter. Continue clobetasol ointment and solution as needed for pustular psoriasis flare. Refills sent to pharmacy.     [id number]  Order: [id number]   Status: Final result      Dx: Rash    Test Result Released: Yes (seen)    View Follow-Up Encounter     Component  Ref Range & Units (hover)   Case Report  Surgical Pathology Report                         Case: [id number]                                  Authorizing Provider:  Sunitha Mehta MD        Collected:           04/01/2025 1010              Ordering Location:     [facility]      Received:            04/01/2025 [facility]                                     Marcella                                                                      Pathologist:           Mary Gamino MD                                                       Specimen:    Skin, Other, Specimen A:Left Lateral Shin                                                Final Diagnosis  A. Skin, Left Lateral Shin, Shave Biopsy:  Spongiotic dermatitis with subcorneal/intraepidermal pustule formation. (See note)    Note: Sections demonstrate subcorneal pustule formation with additional collections of neutrophils and parakeratosis within the stratum corneum. Epidermal spongiosis and corneal serum accumulation is noted as well as broad areas of hypogranulosis. Mixed acute and chronic perivascular and interstitial inflammation with admixed eosinophils is present in the superficial dermis. A colloidal iron stain is negative for increased dermal mucin. PASF is negative for fungal organisms.    The differential diagnosis includes pustular psoriasis, including a psoriasiform hypersensitivity reaction, Sneddon-Lopez/subcorneal pustular dermatosis, IgA pemphigus, and acute generalized exanthematous pustulosis, among others. Clinical correlation and correlation with direct immunofluorescence studies is suggested if there is concern for IgA pemphigus.    Electronically signed by Mary Gamino MD on 4/14/2025 at 1201 EDT

## 2025-04-15 NOTE — TELEPHONE ENCOUNTER
PA for Cosentyx 300mg pen SUBMITTED to [facility]     via    []CMM-KEY:   [x][employer]-Case ID # [id number]   []Availity-Auth ID # NDC #   []Faxed to plan   []Other website   []Phone call Case ID #     [x]PA sent as URGENT    All office notes, labs and other pertaining documents and studies sent. Clinical questions answered. Awaiting determination from insurance company.     Turnaround time for your insurance to make a decision on your Prior Authorization can take 7-21 business days.

## 2025-04-17 NOTE — TELEPHONE ENCOUNTER
Please try calling patient to let her know her Cosentyx was approved and to call us when she gets the medication so we can schedule her for a nurse visit to teach her how to give herself the injections. Pt does not have a voicemail set up so I could not leave message.

## 2025-04-17 NOTE — TELEPHONE ENCOUNTER
PA for Cosentyx 300mg auto injector  APPROVED     Date(s) approved until 07/14/2026    Case #[id number]     Patient advised by          []MyChart Message  [x]Phone call   []LMOM  []L/M to call office as no active Communication consent on file  []Unable to leave detailed message as VM not approved on Communication consent       Pharmacy advised by    []Fax  [x]Phone call  []Secure Chat    Specialty Pharmacy    [x][facility]       Approval letter scanned into Media No Screen shot provided.

## 2025-04-18 ENCOUNTER — PATIENT MESSAGE (OUTPATIENT)
Dept: DERMATOLOGY | Facility: CLINIC | Age: 30
End: 2025-04-18

## 2025-04-18 RX ORDER — SECUKINUMAB 150 MG/ML
INJECTION SUBCUTANEOUS
Qty: 2 ML | Refills: 11 | Status: SHIPPED | OUTPATIENT
Start: 2025-04-18

## 2025-04-18 RX ORDER — SECUKINUMAB 150 MG/ML
INJECTION SUBCUTANEOUS
Qty: 10 ML | Refills: 0 | Status: SHIPPED | OUTPATIENT
Start: 2025-04-18

## 2025-04-18 NOTE — PATIENT COMMUNICATION
The patient calling in regards to the received message. Patient aware of the approval, she will contact the specialty pharmacy and set up a delivery date, then call us back to schedule a nurse appointment.

## 2025-04-21 ENCOUNTER — HOSPITAL ENCOUNTER (EMERGENCY)
Facility: HOSPITAL | Age: 30
Discharge: HOME | End: 2025-04-21
Attending: EMERGENCY MEDICINE
Payer: COMMERCIAL

## 2025-04-21 VITALS
DIASTOLIC BLOOD PRESSURE: 69 MMHG | HEART RATE: 89 BPM | SYSTOLIC BLOOD PRESSURE: 115 MMHG | RESPIRATION RATE: 20 BRPM | OXYGEN SATURATION: 100 % | TEMPERATURE: 98.1 F

## 2025-04-21 DIAGNOSIS — L03.90 CELLULITIS: ICD-10-CM

## 2025-04-21 DIAGNOSIS — R55 SYNCOPE: Primary | ICD-10-CM

## 2025-04-21 DIAGNOSIS — H92.09 EAR PAIN: ICD-10-CM

## 2025-04-21 LAB
ALBUMIN SERPL BCG-MCNC: 3.5 G/DL (ref 3.5–5)
ALP SERPL-CCNC: 68 U/L (ref 34–104)
ALT SERPL W P-5'-P-CCNC: 22 U/L (ref 7–52)
ANION GAP SERPL CALCULATED.3IONS-SCNC: 6 MMOL/L (ref 4–13)
AST SERPL W P-5'-P-CCNC: 27 U/L (ref 13–39)
BASOPHILS # BLD AUTO: 0.02 THOUSANDS/ÂΜL (ref 0–0.1)
BASOPHILS NFR BLD AUTO: 0 % (ref 0–1)
BILIRUB SERPL-MCNC: 0.23 MG/DL (ref 0.2–1)
BUN SERPL-MCNC: 8 MG/DL (ref 5–25)
CALCIUM SERPL-MCNC: 8.7 MG/DL (ref 8.4–10.2)
CARDIAC TROPONIN I PNL SERPL HS: <2 NG/L (ref ?–50)
CHLORIDE SERPL-SCNC: 105 MMOL/L (ref 96–108)
CO2 SERPL-SCNC: 25 MMOL/L (ref 21–32)
CREAT SERPL-MCNC: 0.7 MG/DL (ref 0.6–1.3)
EOSINOPHIL # BLD AUTO: 0.22 THOUSAND/ÂΜL (ref 0–0.61)
EOSINOPHIL NFR BLD AUTO: 3 % (ref 0–6)
ERYTHROCYTE [DISTWIDTH] IN BLOOD BY AUTOMATED COUNT: 16 % (ref 11.6–15.1)
GFR SERPL CREATININE-BSD FRML MDRD: 119 ML/MIN/1.73SQ M
GLUCOSE SERPL-MCNC: 91 MG/DL (ref 65–140)
HCG SERPL QL: NEGATIVE
HCT VFR BLD AUTO: 32.1 % (ref 34.8–46.1)
HGB BLD-MCNC: 10 G/DL (ref 11.5–15.4)
IMM GRANULOCYTES # BLD AUTO: 0.01 THOUSAND/UL (ref 0–0.2)
IMM GRANULOCYTES NFR BLD AUTO: 0 % (ref 0–2)
LYMPHOCYTES # BLD AUTO: 1.8 THOUSANDS/ÂΜL (ref 0.6–4.47)
LYMPHOCYTES NFR BLD AUTO: 22 % (ref 14–44)
MCH RBC QN AUTO: 25.4 PG (ref 26.8–34.3)
MCHC RBC AUTO-ENTMCNC: 31.2 G/DL (ref 31.4–37.4)
MCV RBC AUTO: 82 FL (ref 82–98)
MONOCYTES # BLD AUTO: 0.81 THOUSAND/ÂΜL (ref 0.17–1.22)
MONOCYTES NFR BLD AUTO: 10 % (ref 4–12)
NEUTROPHILS # BLD AUTO: 5.25 THOUSANDS/ÂΜL (ref 1.85–7.62)
NEUTS SEG NFR BLD AUTO: 65 % (ref 43–75)
NRBC BLD AUTO-RTO: 0 /100 WBCS
PLATELET # BLD AUTO: 440 THOUSANDS/UL (ref 149–390)
PMV BLD AUTO: 9.4 FL (ref 8.9–12.7)
POTASSIUM SERPL-SCNC: 3.6 MMOL/L (ref 3.5–5.3)
PROT SERPL-MCNC: 8.6 G/DL (ref 6.4–8.4)
RBC # BLD AUTO: 3.93 MILLION/UL (ref 3.81–5.12)
SODIUM SERPL-SCNC: 136 MMOL/L (ref 135–147)
WBC # BLD AUTO: 8.11 THOUSAND/UL (ref 4.31–10.16)

## 2025-04-21 PROCEDURE — 84484 ASSAY OF TROPONIN QUANT: CPT | Performed by: EMERGENCY MEDICINE

## 2025-04-21 PROCEDURE — 80053 COMPREHEN METABOLIC PANEL: CPT | Performed by: EMERGENCY MEDICINE

## 2025-04-21 PROCEDURE — 93005 ELECTROCARDIOGRAM TRACING: CPT

## 2025-04-21 PROCEDURE — 36415 COLL VENOUS BLD VENIPUNCTURE: CPT | Performed by: EMERGENCY MEDICINE

## 2025-04-21 PROCEDURE — 85025 COMPLETE CBC W/AUTO DIFF WBC: CPT | Performed by: EMERGENCY MEDICINE

## 2025-04-21 PROCEDURE — 84703 CHORIONIC GONADOTROPIN ASSAY: CPT | Performed by: EMERGENCY MEDICINE

## 2025-04-21 PROCEDURE — 96374 THER/PROPH/DIAG INJ IV PUSH: CPT

## 2025-04-21 PROCEDURE — 99284 EMERGENCY DEPT VISIT MOD MDM: CPT

## 2025-04-21 PROCEDURE — 96361 HYDRATE IV INFUSION ADD-ON: CPT

## 2025-04-21 PROCEDURE — 99285 EMERGENCY DEPT VISIT HI MDM: CPT | Performed by: EMERGENCY MEDICINE

## 2025-04-21 RX ORDER — SULFAMETHOXAZOLE AND TRIMETHOPRIM 800; 160 MG/1; MG/1
1 TABLET ORAL ONCE
Status: COMPLETED | OUTPATIENT
Start: 2025-04-21 | End: 2025-04-21

## 2025-04-21 RX ORDER — KETOROLAC TROMETHAMINE 30 MG/ML
15 INJECTION, SOLUTION INTRAMUSCULAR; INTRAVENOUS ONCE
Status: COMPLETED | OUTPATIENT
Start: 2025-04-21 | End: 2025-04-21

## 2025-04-21 RX ORDER — SULFAMETHOXAZOLE AND TRIMETHOPRIM 800; 160 MG/1; MG/1
1 TABLET ORAL 2 TIMES DAILY
Qty: 14 TABLET | Refills: 0 | Status: SHIPPED | OUTPATIENT
Start: 2025-04-21 | End: 2025-04-28

## 2025-04-21 RX ADMIN — SULFAMETHOXAZOLE AND TRIMETHOPRIM 1 TABLET: 800; 160 TABLET ORAL at 22:14

## 2025-04-21 RX ADMIN — KETOROLAC TROMETHAMINE 15 MG: 30 INJECTION, SOLUTION INTRAMUSCULAR; INTRAVENOUS at 22:15

## 2025-04-21 RX ADMIN — SODIUM CHLORIDE 1000 ML: 0.9 INJECTION, SOLUTION INTRAVENOUS at 22:15

## 2025-04-21 NOTE — Clinical Note
Uzair Jean was seen and treated in our emergency department on 4/21/2025.    No restrictions        no limitations    Diagnosis: Syncope, Cellulitis and Ear pain.    Uzair  may return to work on return date, is off the rest of the shift today.    She may return on this date: 04/24/2025         If you have any questions or concerns, please don't hesitate to call.      Scarlett Tafoya, DO    ______________________________           _______________          _______________  Hospital Representative                              Date                                Time

## 2025-04-22 LAB
ATRIAL RATE: 74 BPM
P AXIS: 76 DEGREES
PR INTERVAL: 148 MS
QRS AXIS: 83 DEGREES
QRSD INTERVAL: 78 MS
QT INTERVAL: 388 MS
QTC INTERVAL: 430 MS
T WAVE AXIS: 19 DEGREES
VENTRICULAR RATE: 74 BPM

## 2025-04-22 PROCEDURE — 93010 ELECTROCARDIOGRAM REPORT: CPT | Performed by: STUDENT IN AN ORGANIZED HEALTH CARE EDUCATION/TRAINING PROGRAM

## 2025-04-22 NOTE — ED PROVIDER NOTES
Time reflects when diagnosis was documented in both MDM as applicable and the Disposition within this note       Time User Action Codes Description Comment    4/21/2025 10:47 PM Scarlett Tafoya [R55] Syncope     4/21/2025 10:47 PM Scarlett Tafoya Add [L03.90] Cellulitis     4/21/2025 10:50 PM Scarlett Tafoya [H92.09] Ear pain           ED Disposition       ED Disposition   Discharge    Condition   Stable    Date/Time   Mon Apr 21, 2025 10:47 PM    Comment   Dennisebal Ted discharge to home/self care.                   Assessment & Plan       Medical Decision Making  Patient is a 29-year-old female past medical history of postconcussion syndrome, hidradenitis, psoriasis presenting with cellulitis and near syncope.  Patient is well-appearing at bedside with stable vitals and in no acute distress.  She has no gross maladies on neurologic exam, has area of cellulitis without fluctuance concerning for abscess at this time to the left thigh, diffuse coarse erythematous rash consistent with psoriasis with no increased warmth or purulent drainage over her torso and extremities, and has no gross abnormalities on neurologic exam and no other significant physical exam findings.  Will obtain labs to assess for electrolyte abnormalities, anemia, ALEKS, EKG to assess for arrhythmia, will treat for cellulitis and have advised outpatient dermatology follow-up.    Amount and/or Complexity of Data Reviewed  Labs: ordered.    Risk  Prescription drug management.             Medications   sodium chloride 0.9 % bolus 1,000 mL (0 mL Intravenous Stopped 4/21/25 2315)   ketorolac (TORADOL) injection 15 mg (15 mg Intravenous Given 4/21/25 2215)   sulfamethoxazole-trimethoprim (BACTRIM DS) 800-160 mg per tablet 1 tablet (1 tablet Oral Given 4/21/25 2214)       ED Risk Strat Scores                    No data recorded        SBIRT 20yo+      Flowsheet Row Most Recent Value   Initial Alcohol Screen: US AUDIT-C     1. How often  do you have a drink containing alcohol? 0 Filed at: 04/21/2025 2130   2. How many drinks containing alcohol do you have on a typical day you are drinking?  0 Filed at: 04/21/2025 2130   3b. FEMALE Any Age, or MALE 65+: How often do you have 4 or more drinks on one occassion? 0 Filed at: 04/21/2025 2130   Audit-C Score 0 Filed at: 04/21/2025 2130   PENNY: How many times in the past year have you...    Used an illegal drug or used a prescription medication for non-medical reasons? Never Filed at: 04/21/2025 2130                            History of Present Illness       Chief Complaint   Patient presents with    Syncope     C/o near syncope this morning. Patient states due to Hidradenitis suppurativa flare up over last 2- 3 weeks that she feels is now starting to subside       Past Medical History:   Diagnosis Date    Abnormal Pap smear of cervix     Hidradenitis     gali buttocks and axilla - open areas - draining intermittently    History of COVID-19     12/21/21- no hospitalization    HPV in female     Wears glasses       Past Surgical History:   Procedure Laterality Date    AXILLARY HIDRADENITIS EXCISION Left 3/15/2022    Procedure: EXCISION HIDRADENITIS AXILLARY;  Surgeon: Rhonda Shen MD;  Location: [facility];  Service: General    AXILLARY HIDRADENITIS EXCISION Right 9/2/2022    Procedure: EXCISION HIDRADENITIS AXILLARY;  Surgeon: Rhonda Shen MD;  Location: [facility];  Service: General    SKIN BIOPSY      WOUND DEBRIDEMENT Bilateral 2/11/2022    Procedure: EXCISIONAL DEBRIDEMENT of bilateral buttocks;  Surgeon: Rhonda Shen MD;  Location: [facility];  Service: General      Family History   Problem Relation Age of Onset    Endocrine tumor Mother         pituitary    Alcohol abuse Father     ADD / ADHD Father     Asthma Brother     Diabetes Maternal Grandmother     Hypertension Maternal Grandmother     Diabetes Paternal Grandmother       Social History     Tobacco Use    Smoking status: Some Days  "    Types: Cigars    Smokeless tobacco: Never   Vaping Use    Vaping status: Never Used   Substance Use Topics    Alcohol use: Yes     Comment: socially    Drug use: Yes     Frequency: 7.0 times per week     Types: Marijuana      E-Cigarette/Vaping    E-Cigarette Use Never User       E-Cigarette/Vaping Substances    Nicotine No     THC No     CBD No     Flavoring No     Other No     Unknown No       I have reviewed and agree with the history as documented.     Patient is a 29-year-old female past medical history of hidradenitis, psoriasis, concussion syndrome presenting for near syncope.  Patient states that she is having a \"hidradenitis flare\" as well as a psoriasis flare.  She states that she was on Humira however was recently taken off and has been on steroids in the past but was told by dermatology that the steroids were making it worse.  States that she had a near syncopal episode this morning, feeling lightheaded but denies any chest pain or shortness of breath.  Notes general weakness in the same timeframe.  She states that this occurred when getting out of the shower.  Denies any head trauma but states that she did have a fall and hit her right side on a table.  Denies any full loss of consciousness.  Has not been taking any medication for these symptoms and notes pain and swelling consistent with hidradenitis to the left groin as well as diffuse psoriasis rash which began in her feet and spread all over her body.  Also notes right ear pain times weeks.  Denies any chest pain, shortness breath, nausea/vomiting/diarrhea, dysuria, fevers, vision changes.        Review of Systems   All other systems reviewed and are negative.          Objective       ED Triage Vitals   Temperature Pulse Blood Pressure Respirations SpO2 Patient Position - Orthostatic VS   04/21/25 2126 04/21/25 2126 04/21/25 2126 04/21/25 2126 04/21/25 2126 04/21/25 2126   98.1 °F (36.7 °C) 86 144/75 18 100 % Sitting      Temp Source Heart Rate " Source BP Location FiO2 (%) Pain Score    04/21/25 2126 04/21/25 2126 04/21/25 2126 -- 04/21/25 2200    Oral Monitor Left arm  5      Vitals      Date and Time Temp Pulse SpO2 Resp BP Pain Score FACES Pain Rating User   04/21/25 2300 -- 89 100 % 20 115/69 -- -- SH   04/21/25 2230 -- 85 100 % 20 116/61 3 --    04/21/25 2200 -- 82 100 % 20 117/79 5 --    04/21/25 2126 98.1 °F (36.7 °C) 86 100 % 18 144/75 -- -- TE            Physical Exam  Vitals reviewed.   Constitutional:       General: She is not in acute distress.     Appearance: Normal appearance. She is not ill-appearing.   HENT:      Ears:      Comments: Mild erythema to the right ear canal otherwise unremarkable     Mouth/Throat:      Mouth: Mucous membranes are moist.   Eyes:      Extraocular Movements: Extraocular movements intact.      Conjunctiva/sclera: Conjunctivae normal.   Cardiovascular:      Rate and Rhythm: Normal rate and regular rhythm.      Pulses: Normal pulses.      Heart sounds: Normal heart sounds.   Pulmonary:      Effort: Pulmonary effort is normal.      Breath sounds: Normal breath sounds.   Abdominal:      General: Abdomen is flat.      Palpations: Abdomen is soft.      Tenderness: There is no abdominal tenderness.   Musculoskeletal:         General: No swelling. Normal range of motion.      Cervical back: Neck supple.   Skin:     General: Skin is warm and dry.      Comments: Patient has erythematous mildly indurated roughly 1 cm area to the left groin with no fluctuance, minimal overlying erythema, mild bleeding without purulent drainage   Neurological:      General: No focal deficit present.      Mental Status: She is alert.      Motor: No weakness.      Coordination: Coordination normal.   Psychiatric:         Mood and Affect: Mood normal.         Results Reviewed       Procedure Component Value Units Date/Time    HS Troponin 0hr (reflex protocol) [[id number]]  (Normal) Collected: 04/21/25 2213    Lab Status: Final result Specimen:  Blood from Arm, Right Updated: 04/21/25 2243     hs TnI 0hr <2 ng/L     hCG, qualitative pregnancy [[id number]]  (Normal) Collected: 04/21/25 2213    Lab Status: Final result Specimen: Blood from Arm, Right Updated: 04/21/25 2243     Preg, Serum Negative    Comprehensive metabolic panel [[id number]]  (Abnormal) Collected: 04/21/25 2213    Lab Status: Final result Specimen: Blood from Arm, Right Updated: 04/21/25 2235     Sodium 136 mmol/L      Potassium 3.6 mmol/L      Chloride 105 mmol/L      CO2 25 mmol/L      ANION GAP 6 mmol/L      BUN 8 mg/dL      Creatinine 0.70 mg/dL      Glucose 91 mg/dL      Calcium 8.7 mg/dL      AST 27 U/L      ALT 22 U/L      Alkaline Phosphatase 68 U/L      Total Protein 8.6 g/dL      Albumin 3.5 g/dL      Total Bilirubin 0.23 mg/dL      eGFR 119 ml/min/1.73sq m     Narrative:      National Kidney Disease Foundation guidelines for Chronic Kidney Disease (CKD):     Stage 1 with normal or high GFR (GFR > 90 mL/min/1.73 square meters)    Stage 2 Mild CKD (GFR = 60-89 mL/min/1.73 square meters)    Stage 3A Moderate CKD (GFR = 45-59 mL/min/1.73 square meters)    Stage 3B Moderate CKD (GFR = 30-44 mL/min/1.73 square meters)    Stage 4 Severe CKD (GFR = 15-29 mL/min/1.73 square meters)    Stage 5 End Stage CKD (GFR <15 mL/min/1.73 square meters)  Note: GFR calculation is accurate only with a steady state creatinine    CBC and differential [[id number]]  (Abnormal) Collected: 04/21/25 2213    Lab Status: Final result Specimen: Blood from Arm, Right Updated: 04/21/25 2220     WBC 8.11 Thousand/uL      RBC 3.93 Million/uL      Hemoglobin 10.0 g/dL      Hematocrit 32.1 %      MCV 82 fL      MCH 25.4 pg      MCHC 31.2 g/dL      RDW 16.0 %      MPV 9.4 fL      Platelets 440 Thousands/uL      nRBC 0 /100 WBCs      Segmented % 65 %      Immature Grans % 0 %      Lymphocytes % 22 %      Monocytes % 10 %      Eosinophils Relative 3 %      Basophils Relative 0 %      Absolute Neutrophils 5.25 Thousands/µL       Absolute Immature Grans 0.01 Thousand/uL      Absolute Lymphocytes 1.80 Thousands/µL      Absolute Monocytes 0.81 Thousand/µL      Eosinophils Absolute 0.22 Thousand/µL      Basophils Absolute 0.02 Thousands/µL             No orders to display       ECG 12 Lead Documentation Only    Date/Time: 2025 10:23 PM    Performed by: Scarlett Tafoya DO  Authorized by: Scarlett Tafoya DO    Patient location:  ED  Previous ECG:     Previous ECG:  Compared to current    Similarity:  No change  Interpretation:     Interpretation: normal    Rate:     ECG rate assessment: normal    Rhythm:     Rhythm: sinus rhythm    Ectopy:     Ectopy: none    QRS:     QRS axis:  Normal    QRS intervals:  Normal  Conduction:     Conduction: normal    ST segments:     ST segments:  Normal  T waves:     T waves: normal        ED Medication and Procedure Management   Prior to Admission Medications   Prescriptions Last Dose Informant Patient Reported? Taking?   clobetasol (TEMOVATE) 0.05 % external solution   No No   Sig: Apply topically 2 (two) times a day For two weeks   clobetasol (TEMOVATE) 0.05 % ointment   No No   Sig: Apply topically 2 (two) times a day . After you apply the ointment, wrap the areas in gauze in order to ensure your skin absorbs the medication.   famotidine (PEPCID) 20 mg tablet   No No   Sig: TAKE 1 TABLET BY MOUTH TWICE A DAY   hydrOXYzine HCL (ATARAX) 25 mg tablet  Self No No   Sig: Take 1 tablet (25 mg total) by mouth every 6 (six) hours as needed for itching   loratadine (CLARITIN) 10 mg tablet   No No   Sig: TAKE 1 TABLET BY MOUTH EVERY DAY   methocarbamol (ROBAXIN) 500 mg tablet  Self No No   Sig: Take 1 tablet (500 mg total) by mouth 4 (four) times a day   Patient not taking: Reported on 2025   secukinumab (Cosentyx Sensoready, 300 MG,) 150 mg/mL SOAJ injection   No No   Si mg administered by subcutaneous injection at Weeks 0, 1, 2, 3 and 4.   secukinumab (Cosentyx Sensoready, 300 MG,) 150  mg/mL SOAJ injection   No No   Si mg administered by subcutaneous injection every 4 weeks after loading doses.   triamcinolone (KENALOG) 0.1 % cream  Self No No   Sig: Apply topically 2 (two) times a day      Facility-Administered Medications: None     Discharge Medication List as of 2025 10:51 PM        START taking these medications    Details   sulfamethoxazole-trimethoprim (BACTRIM DS) 800-160 mg per tablet Take 1 tablet by mouth 2 (two) times a day for 7 days smx-tmp DS (BACTRIM) 800-160 mg tabs (1tab q12 D10), Starting 2025, Until 2025, Normal           CONTINUE these medications which have NOT CHANGED    Details   clobetasol (TEMOVATE) 0.05 % external solution Apply topically 2 (two) times a day For two weeks, Starting 2025, Normal      clobetasol (TEMOVATE) 0.05 % ointment Apply topically 2 (two) times a day . After you apply the ointment, wrap the areas in gauze in order to ensure your skin absorbs the medication., Starting Mon 3/31/2025, Normal      famotidine (PEPCID) 20 mg tablet TAKE 1 TABLET BY MOUTH TWICE A DAY, Starting Mon 3/10/2025, Normal      hydrOXYzine HCL (ATARAX) 25 mg tablet Take 1 tablet (25 mg total) by mouth every 6 (six) hours as needed for itching, Starting Sat 2025, Normal      loratadine (CLARITIN) 10 mg tablet TAKE 1 TABLET BY MOUTH EVERY DAY, Starting Mon 3/10/2025, Normal      methocarbamol (ROBAXIN) 500 mg tablet Take 1 tablet (500 mg total) by mouth 4 (four) times a day, Starting 2025, Normal      !! secukinumab (Cosentyx Sensoready, 300 MG,) 150 mg/mL SOAJ injection 300 mg administered by subcutaneous injection at Weeks 0, 1, 2, 3 and 4., Normal      !! secukinumab (Cosentyx Sensoready, 300 MG,) 150 mg/mL SOAJ injection 300 mg administered by subcutaneous injection every 4 weeks after loading doses., Normal      triamcinolone (KENALOG) 0.1 % cream Apply topically 2 (two) times a day, Starting 2025, Normal       !! -  Potential duplicate medications found. Please discuss with provider.          ED SEPSIS DOCUMENTATION   Time reflects when diagnosis was documented in both MDM as applicable and the Disposition within this note       Time User Action Codes Description Comment    4/21/2025 10:47 PM Scarlett Tafoya [R55] Syncope     4/21/2025 10:47 PM Scarlett Tafoya [L03.90] Cellulitis     4/21/2025 10:50 PM Scarlett Tafoya [H92.09] Ear pain                  Scarlett Tafoya DO  04/26/25 1521

## 2025-04-28 ENCOUNTER — TELEPHONE (OUTPATIENT)
Age: 30
End: 2025-04-28

## 2025-04-28 NOTE — TELEPHONE ENCOUNTER
Rec'd call from patient requesting to schedule nurse visit for FIRST COSENTYX INJECTION.    Scheduled patient for tomorrow at 1:00 pm in [place] but I did inform patient that if I scheduled her incorrectly I would call her right back. (Patient lives so far from [place] I wanted to try to schedule in .)    Rec'd clarification from office clinical that must be with NURSE at [place] or [place].    Attempted to contact patient to reschedule tomorrows appointment.    Went directly to [employer] and patient doesn't yet have mailbox set up yet.    Rescheduled patient for tomorrow at 1:00 pm at [place].    Send patient text message.    Will attempt to contact patient before end of day if she hasn't already returned call.

## 2025-04-28 NOTE — TELEPHONE ENCOUNTER
Again, attempted to contact patient. Directly to voicemail. Mailbox not set up.    Was unable to send text message to patient.    Sent MyChart message regarding need to reschedule appointment and requested return call to office ASAP.

## 2025-04-29 ENCOUNTER — TELEPHONE (OUTPATIENT)
Dept: DERMATOLOGY | Facility: CLINIC | Age: 30
End: 2025-04-29

## 2025-04-29 NOTE — TELEPHONE ENCOUNTER
Patient did not present for todays appt at [facility]. Cancelled appointment.    Informed [facility] location that patient may show for 1:00 pm nurse visit, if she doesn't check her messages.    While documenting, received return call from patient.    Apologized for confusion and my error. Informed patient that first injection must be administered by nurse and she was at [place] M/W/Fri.    Scheduled nurse visit for tomorrow at 1:00 pm at [facility].    Patient aware of office location.  Patient aware to arrive 15 minutes prior.    Patient aware med to be out of fridge 40 minutes prior to appointment.

## 2025-04-29 NOTE — TELEPHONE ENCOUNTER
Called and left message for patient regarding appointment being rescheduled for today.    (Hopefully, she's on her way to office now.)    Requested return call from patient if she needed to reschedule.

## 2025-04-29 NOTE — TELEPHONE ENCOUNTER
I called the patient, no answer, left a vm informing her that, unfortunately, we will not able to proceed with the administration and education today, as the nurse who handles this is not available today. We will need to reschedule for another day ( Mondays- Wednesdays or Fridays).

## 2025-04-29 NOTE — TELEPHONE ENCOUNTER
Rec'd notification from office that Lorraine is at [place] M/W/Fri.    Called and left message for patient to please contact me as soon as she got the messages. That we would be unable to do her injection today at [place].

## 2025-04-30 NOTE — TELEPHONE ENCOUNTER
rec'd call from patient staing he will like to cxl the appointment I asked if he will like to reschedule and he said not at this time

## 2025-05-02 ENCOUNTER — PATIENT MESSAGE (OUTPATIENT)
Dept: DERMATOLOGY | Facility: CLINIC | Age: 30
End: 2025-05-02

## 2025-05-06 NOTE — PATIENT COMMUNICATION
Patient currently doesn't have her main number on. I called number provided earlier  [phone] to inform we clarified instructions with specialty pharmacy . She can contact them to confirm delivery now.     Patient has no further questions but will call us back if there's any issues.

## 2025-05-06 NOTE — PATIENT COMMUNICATION
The patient called for an update, she says although approved and sent to the pharmacy - they are waiting for clarification from us to dispense the medicine. Advised that I would give them a call to see what clarification is needed.     Patient opted not to hold as she was headed to work. She asked that I call her back.

## 2025-05-06 NOTE — PATIENT COMMUNICATION
Patient called office to check status of Cosentyx ? She requested to be contacted back at [phone]. I informed her we are trying to get instructions clarified so they can dispense medication.

## 2025-05-06 NOTE — PATIENT COMMUNICATION
I called the pharmacy to provide the clarification needed so that the prescription can be dispensed as prescribed. I spoke with [facility] who asked if the patient will need both the loading and maintenance dose. I confirmed this is correct. He ran the claim for both and got an approved response. They will be contacting the patient immediately to have the prescription sent out within 24-48 hours.     Will call patient back to let her know.     I called the patient to let her know. Call went to voicemail. Voicemail not setup and I was unable to leave a voicemail. Will message patient through my chart.

## 2025-05-09 ENCOUNTER — CLINICAL SUPPORT (OUTPATIENT)
Dept: DERMATOLOGY | Facility: CLINIC | Age: 30
End: 2025-05-09
Payer: COMMERCIAL

## 2025-05-09 VITALS
TEMPERATURE: 98.3 F | DIASTOLIC BLOOD PRESSURE: 85 MMHG | OXYGEN SATURATION: 100 % | SYSTOLIC BLOOD PRESSURE: 126 MMHG | HEART RATE: 64 BPM

## 2025-05-09 DIAGNOSIS — Z76.89 ENCOUNTER FOR MEDICATION ADMINISTRATION: ICD-10-CM

## 2025-05-09 DIAGNOSIS — L73.2 HIDRADENITIS SUPPURATIVA: Primary | ICD-10-CM

## 2025-05-09 PROCEDURE — 96372 THER/PROPH/DIAG INJ SC/IM: CPT | Performed by: DERMATOLOGY

## 2025-05-09 NOTE — PROGRESS NOTES
COSENTYX Biologic Injectable Administration     Additional History of Present Condition:  Patient is present for education and administration of Cosentyx. Medication administration order placed. Provider order matches prescription order.     Assessment and Plan:  Based on a thorough discussion of this condition and the management approach to it (including a comprehensive discussion of the known risks, side effects and potential benefits of treatment), the patient (family) agrees to implement the following specific plan:  First Cosentyx injection administered today in the right and left thigh   Verbal consent obtained to administer.   Next dose due 05/16/2025  Patient provided education and training to continue to administer this at home.   Side effects discussed and how to report  Patient held in office for 30 minutes to monitor for adverse reactions   Schedule 6 month follow up with ordering provider     Biologic Injectable Administration Note  Diagnosis: Hidradenitis suppurativa   This is injection number 1    Informed consent: Discussed risks (Risks of hypersensitivity reaction, injection site reaction, conjunctivitis/keratitis, HSV reactivation, increased susceptibility to parasitic infections, inefficacy were reviewed.) Verbal consent obtained.   Preparation: After discussion potential procedure related risks including pain, bleeding, new infection, reactivation of latent infection, inefficacy, increased risk of malignancy, hypersensitivity reaction, injection site reaction, verbal consent was obtained. The areas were cleansed with alcohol prep pads and allowed to fully air dry for 3 minutes.  Procedure Details:  300mg was injected subcutaneously in the right and left thigh   Lot Number: [id number]  Expiration: 08/31/2026  Total Injected: 300mg   NDC: 6493-8394-25     Patient tolerated procedure well, with minimal pinpoint bleeding that was controlled with pressure. Aftercare was reviewed.     INDICATIONS  COSENTYX®  (secukinumab) is a prescription medicine used to treat:  people 2 years of age and older with active psoriatic arthritis  people 6 years of age and older with moderate to severe plaque psoriasis that involves large areas or many areas of the body, and who may benefit from taking injections or pills (systemic therapy) or phototherapy (treatment using ultraviolet or UV light alone or with systemic therapy)  people 4 years of age and older with active enthesitis-related arthritis  adults with active ankylosing spondylitis  adults with active non-radiographic axial spondyloarthritis and objective signs of inflammation    IMPORTANT SAFETY INFORMATION  Do not use COSENTYX if you have had a severe allergic reaction to secukinumab or any of the other ingredients in COSENTYX. See the Medication Guide for a complete list of ingredients.  COSENTYX is a medicine that affects your immune system. COSENTYX may increase your risk of having serious side effects such as:  Infections  COSENTYX may lower the ability of your immune system to fight infections and may increase your risk of infections, sometimes serious.  Your doctor should check you for tuberculosis (TB) before starting treatment with COSENTYX.  If your doctor feels that you are at risk for TB, you may be treated with medicine for TB before you begin treatment with COSENTYX and during treatment with COSENTYX.  Your doctor should watch you closely for signs and symptoms of TB during treatment with COSENTYX. Do not take COSENTYX if you have an active TB infection.  Before starting COSENTYX, tell your doctor if you:  are being treated for an infection  have an infection that does not go away or that keeps coming back  have TB or have been in close contact with someone with TB  think you have an infection or have symptoms of an infection such as: fevers, sweats, or chills; muscle aches; cough; shortness of breath; blood in your phlegm; weight loss; warm, red, or painful skin  or sores on your body; diarrhea or stomach pain; burning when you urinate or urinate more often than normal  After starting COSENTYX, call your doctor right away if you have any signs of infection listed above. Do not use COSENTYX if you have any signs of infection unless you are instructed to by your doctor.  Inflammatory bowel disease  New cases of inflammatory bowel disease or “flare-ups” can happen with COSENTYX, and can sometimes be serious. If you have inflammatory bowel disease (ulcerative colitis or Crohn’s disease), tell your doctor if you have worsening disease symptoms during treatment with COSENTYX or develop new symptoms of stomach pain or diarrhea.  Serious allergic reactions  Serious allergic reactions can occur. Get emergency medical help right away if you get any of the following symptoms: feeling faint; swelling of your face, eyelids, lips, mouth, tongue, or throat; trouble breathing or throat tightness; chest tightness; skin rash or hives (red, itchy bumps). If you have a severe allergic reaction, do not give another injection of COSENTYX.    Before starting COSENTYX, tell your doctor if you:  have any of the conditions or symptoms listed above for infections.  have inflammatory bowel disease (Crohn's disease or ulcerative colitis).  are allergic to latex. The needle cap on the COSENTYX Sensoready® 150 mg/mL pen and the 150 mg/mL and 75 mg/0.5 mL prefilled syringes contains latex.  have recently received or are scheduled to receive an immunization (vaccine). People who take COSENTYX should not receive live vaccines. Children should be brought up to date with all vaccines before starting COSENTYX.  have any other medical conditions.  are pregnant or plan to become pregnant. It is not known if COSENTYX can harm your unborn baby. You and your doctor should decide if you will use COSENTYX.  are breastfeeding or plan to breastfeed. It is not known if COSENTYX passes into your breast milk.  Tell your  doctor about all the medicines you take, including prescription and over-the-counter medicines, vitamins, and herbal supplements. Know the medicines you take. Keep a list of your medicines to show your doctor and pharmacist when you get a new medicine.  How should I use COSENTYX?  See the detailed Instructions for Use that comes with your COSENTYX for information on how to prepare and inject a dose of COSENTYX, and how to properly throw away (dispose of) used COSENTYX Sensoready pens and prefilled syringes.  Use COSENTYX exactly as prescribed by your doctor.  If your doctor decides that you or a caregiver may give your injections of COSENTYX at home, you should receive training on the right way to prepare and inject COSENTYX. Do not try to inject COSENTYX yourself, until you or your caregiver has been shown how to inject COSENTYX by your doctor or nurse.  The most common side effects of COSENTYX include: cold symptoms, diarrhea, and upper respiratory infections. These are not all of the possible side effects of COSENTYX. Call your doctor for medical advice about side effects.  You are encouraged to report negative side effects of prescription drugs to the FDA. Visit www.fda.gov/medwatch, or call 9-453-FDA-8813.

## 2025-05-11 ENCOUNTER — OFFICE VISIT (OUTPATIENT)
Dept: URGENT CARE | Facility: MEDICAL CENTER | Age: 30
End: 2025-05-11
Payer: COMMERCIAL

## 2025-05-11 VITALS
TEMPERATURE: 98 F | DIASTOLIC BLOOD PRESSURE: 81 MMHG | RESPIRATION RATE: 18 BRPM | OXYGEN SATURATION: 98 % | SYSTOLIC BLOOD PRESSURE: 125 MMHG | HEART RATE: 90 BPM

## 2025-05-11 DIAGNOSIS — S83.92XA SPRAIN OF LEFT KNEE, UNSPECIFIED LIGAMENT, INITIAL ENCOUNTER: Primary | ICD-10-CM

## 2025-05-11 PROCEDURE — S9083 URGENT CARE CENTER GLOBAL: HCPCS | Performed by: PHYSICIAN ASSISTANT

## 2025-05-11 PROCEDURE — G0382 LEV 3 HOSP TYPE B ED VISIT: HCPCS | Performed by: PHYSICIAN ASSISTANT

## 2025-05-11 RX ORDER — NAPROXEN 500 MG/1
500 TABLET ORAL 2 TIMES DAILY WITH MEALS
Qty: 20 TABLET | Refills: 0 | Status: SHIPPED | OUTPATIENT
Start: 2025-05-11 | End: 2025-05-21

## 2025-05-11 NOTE — LETTER
May 11, 2025     Patient: Uzair Jean   YOB: 1995   Date of Visit: 5/11/2025       To Whom it May Concern:    Uzair Jean was seen in my clinic on 5/11/2025. She may return to work on 5/13/2025 .    If you have any questions or concerns, please don't hesitate to call.         Sincerely,          Edvin Talley PA-C        CC: No Recipients

## 2025-05-11 NOTE — PATIENT INSTRUCTIONS
Sprain knee  Over-the-counter pain medication as needed  Follow up with PCP in 3-5 days.  Proceed to  ER if symptoms worsen.

## 2025-05-11 NOTE — PROGRESS NOTES
[facility]        NAME: Uzair Jean is a 29 y.o. female  : 1995    MRN: [id number]  DATE: May 11, 2025  TIME: 6:43 PM    Assessment and Plan   Sprain of left knee, unspecified ligament, initial encounter [S83.92XA]  1. Sprain of left knee, unspecified ligament, initial encounter              Patient Instructions     Sprain knee  Over-the-counter pain medication as needed  Follow up with PCP in 3-5 days.  Proceed to  ER if symptoms worsen.    Chief Complaint     Chief Complaint   Patient presents with    Knee Pain     Left knee pain/swelling; denies fall or trauma; states they may have twisted it while ambulating at concert          History of Present Illness       29-year-old who presents complaining of knee pain.  Patient does not recall a moment of trauma but states that the is pain and buckling when walking on it.  Denies fall, rashes, fever.    Knee Pain         Review of Systems   Review of Systems   Constitutional: Negative.    HENT: Negative.     Eyes: Negative.    Respiratory: Negative.  Negative for cough, chest tightness, shortness of breath, wheezing and stridor.    Cardiovascular: Negative.  Negative for chest pain, palpitations and leg swelling.   Musculoskeletal:  Positive for arthralgias.         Current Medications       Current Outpatient Medications:     clobetasol (TEMOVATE) 0.05 % external solution, Apply topically 2 (two) times a day For two weeks, Disp: 50 mL, Rfl: 2    clobetasol (TEMOVATE) 0.05 % ointment, Apply topically 2 (two) times a day . After you apply the ointment, wrap the areas in gauze in order to ensure your skin absorbs the medication., Disp: 60 g, Rfl: 1    famotidine (PEPCID) 20 mg tablet, TAKE 1 TABLET BY MOUTH TWICE A DAY, Disp: 180 tablet, Rfl: 1    hydrOXYzine HCL (ATARAX) 25 mg tablet, Take 1 tablet (25 mg total) by mouth every 6 (six) hours as needed for itching, Disp: 30 tablet, Rfl: 0    loratadine (CLARITIN) 10 mg tablet, TAKE 1 TABLET BY MOUTH  EVERY DAY, Disp: 90 tablet, Rfl: 1    methocarbamol (ROBAXIN) 500 mg tablet, Take 1 tablet (500 mg total) by mouth 4 (four) times a day (Patient not taking: Reported on 1/25/2025), Disp: 30 tablet, Rfl: 0    secukinumab (Cosentyx Sensoready, 300 MG,) 150 mg/mL SOAJ injection, 300 mg administered by subcutaneous injection at Weeks 0, 1, 2, 3 and 4., Disp: 10 mL, Rfl: 0    secukinumab (Cosentyx Sensoready, 300 MG,) 150 mg/mL SOAJ injection, 300 mg administered by subcutaneous injection every 4 weeks after loading doses., Disp: 2 mL, Rfl: 11    triamcinolone (KENALOG) 0.1 % cream, Apply topically 2 (two) times a day, Disp: 454 g, Rfl: 0    Current Allergies     Allergies as of 05/11/2025 - Reviewed 05/11/2025   Allergen Reaction Noted    Oxycodone-acetaminophen Drowsiness 07/03/2018    Percolone [oxycodone] GI Intolerance 06/01/2017            The following portions of the patient's history were reviewed and updated as appropriate: allergies, current medications, past family history, past medical history, past social history, past surgical history and problem list.     Past Medical History:   Diagnosis Date    Abnormal Pap smear of cervix     Hidradenitis     gali buttocks and axilla - open areas - draining intermittently    History of COVID-19     12/21/21- no hospitalization    HPV in female     Wears glasses        Past Surgical History:   Procedure Laterality Date    AXILLARY HIDRADENITIS EXCISION Left 3/15/2022    Procedure: EXCISION HIDRADENITIS AXILLARY;  Surgeon: Rhonda Shen MD;  Location: [facility];  Service: General    AXILLARY HIDRADENITIS EXCISION Right 9/2/2022    Procedure: EXCISION HIDRADENITIS AXILLARY;  Surgeon: Rhonda Shen MD;  Location: [facility];  Service: General    SKIN BIOPSY      WOUND DEBRIDEMENT Bilateral 2/11/2022    Procedure: EXCISIONAL DEBRIDEMENT of bilateral buttocks;  Surgeon: Rhonda Shen MD;  Location: [facility];  Service: General       Family History   Problem  Relation Age of Onset    Endocrine tumor Mother         pituitary    Alcohol abuse Father     ADD / ADHD Father     Asthma Brother     Diabetes Maternal Grandmother     Hypertension Maternal Grandmother     Diabetes Paternal Grandmother          Medications have been verified.        Objective   /81   Pulse 90   Temp 98 °F (36.7 °C)   Resp 18   SpO2 98%        Physical Exam     Physical Exam  Constitutional:       Appearance: She is well-developed.   HENT:      Head: Normocephalic and atraumatic.      Right Ear: External ear normal.      Left Ear: External ear normal.   Cardiovascular:      Rate and Rhythm: Normal rate and regular rhythm.      Heart sounds: Normal heart sounds.   Pulmonary:      Effort: Pulmonary effort is normal. No respiratory distress.      Breath sounds: Normal breath sounds. No wheezing or rales.   Chest:      Chest wall: No tenderness.   Musculoskeletal:      Cervical back: Normal range of motion and neck supple.      Right knee: Normal.      Left knee: No swelling, deformity, effusion, erythema, ecchymosis, lacerations, bony tenderness or crepitus. Normal range of motion. No tenderness. No LCL laxity, MCL laxity, ACL laxity or PCL laxity.Normal alignment. Normal pulse.   Lymphadenopathy:      Cervical: No cervical adenopathy.         Patient states that Pepcid was only given due to his psoriasis condition and not gastric reflux and she takes NSAIDs without any side effect.

## 2025-05-21 ENCOUNTER — HOSPITAL ENCOUNTER (OUTPATIENT)
Dept: INFUSION CENTER | Facility: CLINIC | Age: 30
Discharge: HOME | End: 2025-05-21

## 2025-06-09 ENCOUNTER — TELEPHONE (OUTPATIENT)
Dept: DERMATOLOGY | Facility: CLINIC | Age: 30
End: 2025-06-09

## 2025-06-11 ENCOUNTER — OFFICE VISIT (OUTPATIENT)
Dept: DERMATOLOGY | Facility: CLINIC | Age: 30
End: 2025-06-11
Payer: COMMERCIAL

## 2025-06-11 DIAGNOSIS — L73.2 HIDRADENITIS SUPPURATIVA: Primary | ICD-10-CM

## 2025-06-11 DIAGNOSIS — L40.9 PSORIASIS: ICD-10-CM

## 2025-06-11 PROCEDURE — 99214 OFFICE O/P EST MOD 30 MIN: CPT | Performed by: DERMATOLOGY

## 2025-06-11 RX ORDER — SECUKINUMAB 150 MG/ML
INJECTION SUBCUTANEOUS
Qty: 2 ML | Refills: 11 | Status: SHIPPED | OUTPATIENT
Start: 2025-06-11

## 2025-06-11 RX ORDER — KETOCONAZOLE 20 MG/ML
SHAMPOO, SUSPENSION TOPICAL
Qty: 120 ML | Refills: 10 | Status: SHIPPED | OUTPATIENT
Start: 2025-06-11

## 2025-06-11 RX ORDER — DOXYCYCLINE 100 MG/1
100 CAPSULE ORAL 2 TIMES DAILY
Qty: 60 CAPSULE | Refills: 0 | Status: SHIPPED | OUTPATIENT
Start: 2025-06-11 | End: 2025-07-11

## 2025-06-11 NOTE — PROGRESS NOTES
"[facility] Note     Patient Name: Uzair Jean  Encounter Date: 06/11/2025       Have you been cared for by a [facility] Dermatologist in the last 3 years and, if so, which description applies to you? Yes. I have been here within the last 3 years, and my medical history has NOT changed since that time. I am of child-bearing potential.     REVIEW OF SYSTEMS:  Have you recently had or currently have any of the following? No changes in my recent health.   PAST MEDICAL HISTORY:  Have you personally ever had or currently have any of the following?  If \"YES,\" then please provide more detail. No changes in my medical history.   HISTORY OF IMMUNOSUPPRESSION: Do you have a history of any of the following:  Systemic Immunosuppression such as Diabetes, Biologic or Immunotherapy, Chemotherapy, Organ Transplantation, Bone Marrow Transplantation or Prednisone?  No     Answering \"YES\" requires the addition of the dotphrase \"IMMUNOSUPPRESSED\" as the first diagnosis of the patient's visit.   FAMILY HISTORY:  Any \"first degree relatives\" (parent, brother, sister, or child) with the following?    No changes in my family's known health.   PATIENT EXPERIENCE:    Do you want the Dermatologist to perform a COMPLETE skin exam today including a clinical examination under the \"bra and underwear\" areas?  NO  If necessary, do we have your permission to call and leave a detailed message on your Preferred Phone number that includes your specific medical information?  Yes      Allergies[1] Current Medications[2]              Whom besides the patient is providing clinical information about today's encounter?   NO ADDITIONAL HISTORIAN (patient alone provided history)    Physical Exam and Assessment/Plan by Diagnosis:    HIDRADENITIS SUPPURATIVA, psoriasis 2/2 medication    Physical Exam:  Anatomic Location Affected:  groin and buttock  Morphological Description:  tender nodules, sinus track, scaring crandall stage 3  Psoriasis " lesion are hyperpigmented and improving  Scalp dry with scale   Pertinent Positives:  Pertinent Negatives:    Additional History of Present Condition:  patient is present for follow up. Patient states she has had 5 doses of cosentyx. Patient states she has been has a flare up since yesterday     Assessment and Plan:  Based on a thorough discussion of this condition and the management approach to it (including a comprehensive discussion of the known risks, side effects and potential benefits of treatment), the patient (family) agrees to implement the following specific plan:  Will refill cosentyx  Continue cosentyx injection, try at least 3 months  Start doxycyline 100 mg twice daily for 30 days   Follow up 6 [place]; please update reach out in mychart in interim  Can uses urea cream 40% on feet  Start ketoconazole shampoo   Declined ILK today    What is hidradenitis suppurativa?  Hidradenitis suppurativa is an inflammatory skin disease that affects apocrine gland-bearing skin in the axillae, in the groin, and under the breasts. It is characterised by recurrent boil-like nodules and abscesses that culminate in pus-like discharge, difficult-to-heal open wounds (sinuses) and scarring. Hidradenitis suppurativa also has significant psychological impact and many patients suffer from impairment of body image, depression and anxiety.     The term hidradenitis implies it starts as an inflammatory disorder of sweat glands, which is now known to be incorrect. Hidradenitis suppurativa is also known as acne inversa.    Who gets hidradenitis suppurativa?  Hidradenitis often starts at puberty, and is most active between the ages of 20 and 40 years, and in women, can resolve at menopause. It is three times more common in females than in males. Risk factors include:  Other family members with hidradenitis suppurativa  Obesity and insulin resistance/metabolic syndrome  Cigarette smoking  Follicular occlusion disorders: acne conglobata,  dissecting cellulitis, pilonidal sinus  Inflammatory bowel disease (Crohn disease)  Rare autoinflammatory syndromes associated with abnormalities of PSTPIP1 gene.*    * PAPA syndrome (pyogenic arthritis, pyoderma gangrenosum and acne), PASH syndrome (pyoderma gangrenosum, acne, suppurative hidradenitis) and PAPASH syndrome (pyogenic arthritis, pyoderma gangrenosum, acne, suppurative hidradenitis).    What causes hidradenitis suppurativa?  Hidradenitis suppurativa is an autoinflammatory disorder. Although the exact cause is not yet understood, contributing factors include:  Friction from clothing and body folds  Aberrant immune response to commensal bacteria  Abnormal cutaneous or follicular microbiome  Follicular occlusion  Release of pro-inflammatory cytokines  Inflammation causing rupture of the follicular wall and destroying apocrine glands and ducts  Secondary bacterial infection  Certain drugs.    What are the clinical features of hidradenitis suppurativa?  Hidradenitis can affect a single or multiple areas in the armpits, neck, sub mammary area, and inner thighs. Anogenital involvement most commonly affects the groin, mons pubis, vulva (in females), sides of the scrotum (in males), perineum, buttocks and perianal folds.  Signs include:  Open and closed comedones  Painful firm papules, larger nodules and pleated ridges  Pustules, fluctuant pseudocysts and abscesses  Pyogenic granulomas  Draining sinuses linking inflammatory lesions  Hypertrophic and atrophic scars.    Many patients with hidradenitis suppurativa also suffer from other skin disorders, including acne, hirsutism and psoriasis.    The severity and extent of hidradenitis suppurativa is recorded at assessment and when determining the impact of a treatment. The Stauffer system describes three distinct clinical stages:  Solitary or multiple, isolated abscess formation without scarring or sinus tracts  Recurrent abscesses, single or multiple widely   lesions, with sinus tract formation  Diffuse or broad involvement, with multiple interconnected sinus tracts and abscesses.    Severe hidradenitis (Stauffer Stage 3) has been associated with:  Male sex  Axillary and perianal involvement  Obesity  Smoking  Higher risk of stroke, coronary artery disease, heart failure, and peripheral artery disease  Disease duration.    What is the treatment for hidradenitis suppurativa?  General measures  Weight loss; follow an anti-inflammatory, low-sugar, low-grain, low-dairy diet (mainly plants)  Smoking cessation: this can lead to improvement within a few months  Loose fitting clothing  Daily unfragranced antiperspirants  If prone to secondary infection, wash with antiseptics or take bleach baths  Apply hydrogen peroxide solution or medical grade honey to reduce malodour  Use peeling agents such as resorcinol 15% cream to de-roof nodules  Apply simple dressings to draining sinuses  Analgesics, such as paracetamol (acetaminophen), for pain control  Seek help to manage anxiety and depression.    Medical management of hidradenitis suppurativa  Medical management of hidradenitis suppurativa is difficult. Treatment is required long term. Effective options are listed below.    Antibiotics  Topical clindamycin, with benzoyl peroxide to reduce bacterial resistance  Short course of oral antibiotics for acute staphylococcal abscesses, eg flucloxacillin  Prolonged courses (minimum 3 months) of tetracycline, metronidazole, trimethoprim + sulphamethoxazole, fluoroquinolones, ertapenem or dapsone for their anti-inflammatory action  6-12 week courses of the combination of clindamycin (or doxycycline) and rifampicin for severe disease.    Antiandrogens  Long-term oral contraceptive pill; antiandrogenic progesterones drospirenone or cyproterone acetate may be more effective than standard combined pills. These are more suitable than progesterone-only pills or devices.  Spironolactone and  finasteride  Response takes 6 months or longer.    Immunomodulatory treatments for severe disease  Intralesional corticosteroids into nodules  Systemic corticosteroids short-term for flares  Methotrexate, ciclosporin, and azathioprine  TNF-? inhibitors adalimumab and infliximab, used in higher dose than required for psoriasis, are the most successful treatments to date. Note that paradoxically, they may sometimes induce new-onset hidradenitis suppurativa  Other biologics are under investigation, such as the IL-1? antagonist, canakinumab    Other medical treatments  Metformin in patients with insulin resistance  Acitretin (unsuitable for females of childbearing potential)  Isotretinoin -- effective for acne but appears unhelpful for most cases of hidradenitis  Colchicine  Medical management of anxiety and depression    Surgical management of hidradenitis suppurativa  Incision and drainage of acute abscesses  Curettage and deroofing of nodules, abscesses and sinuses  Laser ablation of nodules, abscesses and sinuses  Wide local excision of persistent nodules  Radical excisional surgery of entire affected area  Nd:YAG laser hair removal        Scribe Attestation      I,:  Ora Coker am acting as a scribe while in the presence of the attending physician.:       I,:  Greta Wolfe MD personally performed the services described in this documentation    as scribed in my presence.:                   [1]   Allergies  Allergen Reactions    Oxycodone-Acetaminophen Drowsiness    Percolone [Oxycodone] GI Intolerance   [2]   Current Outpatient Medications:     clobetasol (TEMOVATE) 0.05 % external solution, Apply topically 2 (two) times a day For two weeks, Disp: 50 mL, Rfl: 2    clobetasol (TEMOVATE) 0.05 % ointment, Apply topically 2 (two) times a day . After you apply the ointment, wrap the areas in gauze in order to ensure your skin absorbs the medication., Disp: 60 g, Rfl: 1    famotidine (PEPCID) 20 mg tablet, TAKE 1  TABLET BY MOUTH TWICE A DAY, Disp: 180 tablet, Rfl: 1    hydrOXYzine HCL (ATARAX) 25 mg tablet, Take 1 tablet (25 mg total) by mouth every 6 (six) hours as needed for itching, Disp: 30 tablet, Rfl: 0    loratadine (CLARITIN) 10 mg tablet, TAKE 1 TABLET BY MOUTH EVERY DAY, Disp: 90 tablet, Rfl: 1    methocarbamol (ROBAXIN) 500 mg tablet, Take 1 tablet (500 mg total) by mouth 4 (four) times a day (Patient not taking: Reported on 1/25/2025), Disp: 30 tablet, Rfl: 0    naproxen (NAPROSYN) 500 mg tablet, Take 1 tablet (500 mg total) by mouth 2 (two) times a day with meals for 10 days, Disp: 20 tablet, Rfl: 0    secukinumab (Cosentyx Sensoready, 300 MG,) 150 mg/mL SOAJ injection, 300 mg administered by subcutaneous injection at Weeks 0, 1, 2, 3 and 4., Disp: 10 mL, Rfl: 0    secukinumab (Cosentyx Sensoready, 300 MG,) 150 mg/mL SOAJ injection, 300 mg administered by subcutaneous injection every 4 weeks after loading doses., Disp: 2 mL, Rfl: 11    triamcinolone (KENALOG) 0.1 % cream, Apply topically 2 (two) times a day, Disp: 454 g, Rfl: 0

## 2025-06-23 ENCOUNTER — TELEPHONE (OUTPATIENT)
Age: 30
End: 2025-06-23

## 2025-06-23 NOTE — TELEPHONE ENCOUNTER
PA for Cosentyx 300mg auto injector SUBMITTED to [employer]     via    []CMM-KEY:   [x][employer]-Case ID # [id number]   []Availity-Auth ID # NDC #   []Faxed to plan   []Other website   []Phone call Case ID #     [x]PA sent as URGENT    All office notes, labs and other pertaining documents and studies sent. Clinical questions answered. Awaiting determination from insurance company.     Turnaround time for your insurance to make a decision on your Prior Authorization can take 7-21 business days.

## 2025-06-24 NOTE — TELEPHONE ENCOUNTER
PA for Cosentyx 300mg auto injector  APPROVED     Date(s) approved until 06/23/2026    Case #[id number]     Patient advised by          [x]MyChart Message  [x]Phone call - unable to leave voicemail   []LMOM  []L/M to call office as no active Communication consent on file  []Unable to leave detailed message as VM not approved on Communication consent       Pharmacy advised by    [x]Fax  []Phone call  []Secure Chat    Specialty Pharmacy    [x][facility]      Approval letter scanned into Media No

## 2025-08-01 ENCOUNTER — OFFICE VISIT (OUTPATIENT)
Dept: URGENT CARE | Facility: CLINIC | Age: 30
End: 2025-08-01
Payer: COMMERCIAL

## 2025-08-01 VITALS
RESPIRATION RATE: 19 BRPM | DIASTOLIC BLOOD PRESSURE: 80 MMHG | HEART RATE: 83 BPM | TEMPERATURE: 98.1 F | WEIGHT: 197.4 LBS | SYSTOLIC BLOOD PRESSURE: 128 MMHG | BODY MASS INDEX: 32.85 KG/M2 | OXYGEN SATURATION: 99 %

## 2025-08-01 DIAGNOSIS — B35.9 RINGWORM: Primary | ICD-10-CM

## 2025-08-01 PROCEDURE — G0382 LEV 3 HOSP TYPE B ED VISIT: HCPCS | Performed by: PHYSICIAN ASSISTANT

## 2025-08-01 PROCEDURE — S9083 URGENT CARE CENTER GLOBAL: HCPCS | Performed by: PHYSICIAN ASSISTANT

## 2025-08-01 RX ORDER — CLOTRIMAZOLE 1 %
CREAM (GRAM) TOPICAL 2 TIMES DAILY
Qty: 28 G | Refills: 0 | Status: SHIPPED | OUTPATIENT
Start: 2025-08-01 | End: 2025-08-15

## (undated) DEVICE — SUT MONOCRYL 4-0 PS-2 18 IN Y496G

## (undated) DEVICE — GLOVE SRG BIOGEL 7

## (undated) DEVICE — BETHLEHEM UNIVERSAL MINOR GEN: Brand: CARDINAL HEALTH

## (undated) DEVICE — LIGHT HANDLE COVER SLEEVE DISP BLUE STELLAR

## (undated) DEVICE — TUBING SUCTION 5MM X 12 FT

## (undated) DEVICE — SURGICEL 4 X 8

## (undated) DEVICE — POOLE SUCTION HANDLE: Brand: CARDINAL HEALTH

## (undated) DEVICE — SPONGE STICK WITH PVP-I: Brand: KENDALL

## (undated) DEVICE — INTENDED FOR TISSUE SEPARATION, AND OTHER PROCEDURES THAT REQUIRE A SHARP SURGICAL BLADE TO PUNCTURE OR CUT.: Brand: BARD-PARKER SAFETY BLADES SIZE 15, STERILE

## (undated) DEVICE — SUT VICRYL 3-0 SH 27 IN J416H

## (undated) DEVICE — PLUMEPEN PRO 10FT

## (undated) DEVICE — NEEDLE 25G X 1 1/2

## (undated) DEVICE — CHLORAPREP HI-LITE 26ML ORANGE

## (undated) DEVICE — ABDOMINAL PAD: Brand: DERMACEA

## (undated) DEVICE — PAD GROUNDING ADULT

## (undated) DEVICE — GAUZE SPONGES,16 PLY: Brand: CURITY

## (undated) DEVICE — COVER ROLL 8 IN X 2 YD STRETCH TAPE

## (undated) DEVICE — SURGICEL 2 X 3

## (undated) DEVICE — MEDI-VAC YANK SUCT HNDL W/TPRD BULBOUS TIP: Brand: CARDINAL HEALTH

## (undated) DEVICE — CURITY PLAIN PACKING STRIP: Brand: CURITY

## (undated) DEVICE — FINGER TUBING + CABLE MANAGER

## (undated) DEVICE — SCD SEQUENTIAL COMPRESSION COMFORT SLEEVE MEDIUM KNEE LENGTH: Brand: KENDALL SCD

## (undated) DEVICE — DRAPE EQUIPMENT RF WAND